# Patient Record
Sex: FEMALE | Race: BLACK OR AFRICAN AMERICAN | Employment: UNEMPLOYED | URBAN - METROPOLITAN AREA
[De-identification: names, ages, dates, MRNs, and addresses within clinical notes are randomized per-mention and may not be internally consistent; named-entity substitution may affect disease eponyms.]

---

## 2019-01-02 LAB
ANTIBODY SCREEN, EXTERNAL: NEGATIVE
CHLAMYDIA, EXTERNAL: NEGATIVE
HBSAG, EXTERNAL: NEGATIVE
HIV, EXTERNAL: NORMAL
N. GONORRHEA, EXTERNAL: NEGATIVE
RPR, EXTERNAL: NORMAL
RUBELLA, EXTERNAL: NORMAL
TYPE, ABO & RH, EXTERNAL: NORMAL

## 2019-04-02 ENCOUNTER — HOSPITAL ENCOUNTER (OUTPATIENT)
Age: 39
Discharge: HOME | End: 2019-04-02
Attending: OBSTETRICS & GYNECOLOGY | Admitting: OBSTETRICS & GYNECOLOGY
Payer: MEDICAID

## 2019-04-02 PROBLEM — O36.8190 DECREASED FETAL MOVEMENT: Status: ACTIVE | Noted: 2019-04-02

## 2019-04-02 LAB
APPEARANCE UR: CLEAR
BILIRUB UR QL: NEGATIVE
COLOR UR: ABNORMAL
EST. AVERAGE GLUCOSE BLD GHB EST-MCNC: 128 MG/DL
GLUCOSE UR QL STRIP.AUTO: NEGATIVE MG/DL
HBA1C MFR BLD: 6.1 % (ref 4.2–5.6)
KETONES UR-MCNC: 15 MG/DL
LEUKOCYTE ESTERASE UR QL STRIP: NEGATIVE
NITRITE UR QL: NEGATIVE
PH UR: 6 [PH] (ref 5–9)
PROT UR QL: 30 MG/DL
RBC # UR STRIP: NEGATIVE /UL
SERVICE CMNT-IMP: ABNORMAL
SP GR UR: >1.03 (ref 1–1.02)
UROBILINOGEN UR QL: 0.2 EU/DL (ref 0.2–1)

## 2019-04-02 PROCEDURE — 83036 HEMOGLOBIN GLYCOSYLATED A1C: CPT

## 2019-04-02 PROCEDURE — 59025 FETAL NON-STRESS TEST: CPT

## 2019-04-02 PROCEDURE — 36415 COLL VENOUS BLD VENIPUNCTURE: CPT

## 2019-04-02 PROCEDURE — 81003 URINALYSIS AUTO W/O SCOPE: CPT

## 2019-04-02 PROCEDURE — 99284 EMERGENCY DEPT VISIT MOD MDM: CPT

## 2019-04-02 NOTE — DISCHARGE INSTRUCTIONS
[facility] states that you have an appointment in the office scheduled for tomorrow. Please keep this appointment. Counting Your Baby's Kicks: Care Instructions  Your Care Instructions    Counting your baby's kicks is one way your doctor can tell that your baby is healthy. Most women--especially in a first pregnancy--feel their baby move for the first time between 16 and 22 weeks. The movement may feel like flutters rather than kicks. Your baby may move more at certain times of the day. When you are active, you may notice less kicking than when you are resting. At your prenatal visits, your doctor will ask whether the baby is active. In your last trimester, your doctor may ask you to count the number of times you feel your baby move. Follow-up care is a key part of your treatment and safety. Be sure to make and go to all appointments, and call your doctor if you are having problems. It's also a good idea to know your test results and keep a list of the medicines you take. How do you count fetal kicks? · A common method of checking your baby's movement is to count the number of kicks or moves you feel in 1 hour. Ten movements (such as kicks, flutters, or rolls) in 1 hour are normal. Some doctors suggest that you count in the morning until you get to 10 movements. Then you can quit for that day and start again the next day. · Pick your baby's most active time of day to count. This may be any time from morning to evening. · If you do not feel 10 movements in an hour, your baby may be sleeping. Wait for the next hour and count again. When should you call for help? Call your doctor now or seek immediate medical care if:    · You noticed that your baby has stopped moving or is moving much less than normal.    Watch closely for changes in your health, and be sure to contact your doctor if you have any problems. Where can you learn more? Go to http://garry-eliza.info/.   Enter G656 in the search box to learn more about \"Counting Your Baby's Kicks: Care Instructions. \"  Current as of: September 5, 2018  Content Version: 11.9  © 5674-5140 [employer]. Care instructions adapted under license by [employer] (which disclaims liability or warranty for this information). If you have questions about a medical condition or this instruction, always ask your healthcare professional. [address] any warranty or liability for your use of this information. Gestational Diabetes Diet: Care Instructions  Your Care Instructions    Gestational diabetes is a form of diabetes that can happen during pregnancy. It usually goes away after the baby is born. Diabetes means that your pancreas can't make enough insulin or your body does not use insulin properly. Insulin helps sugar enter your cells, where it is used for energy. You may be able to control your blood sugar while you are pregnant by eating a healthy diet and getting regular exercise. A dietitian or certified diabetes educator (CDE) can help you make a food plan. This plan will help control your blood sugar and provide good nutrition for you and your baby. If diet and exercise don't lower or control your blood sugar, you may need diabetes medicine or insulin. Follow-up care is a key part of your treatment and safety. Be sure to make and go to all appointments, and call your doctor if you are having problems. It's also a good idea to know your test results and keep a list of the medicines you take. How can you care for yourself at home? · Learn which foods have carbohydrate. Eating too much carbohydrate will cause your blood sugar to go too high. Carbohydrate foods include:  ? Breads, cereals, pasta, and rice. ? Dried beans and starchy vegetables, like corn, peas, and potatoes. ? Fruits and fruit juice, milk, and yogurt. ? Candy, table sugar, soda pop, and drinks sweetened with sugar.   · Learn how much carbohydrate you need each day. A dietitian or certified diabetes educator (CDE) can teach you how to keep track of how much carbohydrate you eat. · Try to eat the same amount of carbohydrate at each meal. This will help keep your blood sugar steady. Do not save up your daily allowance of carbohydrate to eat at one meal.  · Limit foods that have added sugar. This includes candy, desserts, and soda pop. These foods need to be counted as part of your total carbohydrate intake for the day. · Do not drink alcohol. Alcohol is not safe for you or your baby. · Do not skip meals. Your blood sugar may drop too low if you skip meals and use insulin. · Write down what you eat every day. Review your record with your dietitian or CDE to see if you are eating the right amounts of foods. · Check your blood sugar first thing in the morning before you eat. Then check your blood sugar 1 to 2 hours after the first bite of each meal (or as your doctor recommends). This will help you see how the food you eat affects your blood sugar. Keep track of these levels. Share the record with your doctor. When should you call for help? Watch closely for changes in your health, and be sure to contact your doctor if:    · You have questions about your diet.     · You often have problems with high or low blood sugar. Where can you learn more? Go to http://garry-eliza.info/. Enter M291 in the search box to learn more about \"Gestational Diabetes Diet: Care Instructions. \"  Current as of: July 25, 2018  Content Version: 11.9  © 5506-6373 [employer]. Care instructions adapted under license by [employer] (which disclaims liability or warranty for this information). If you have questions about a medical condition or this instruction, always ask your healthcare professional. [place] [address] any warranty or liability for your use of this information.             Weeks 32 to 34 of Your Pregnancy: Care Instructions  Your Care Instructions    During the last few weeks of your pregnancy, you may have more aches and pains. It's important to rest when you can. Your growing baby is putting more pressure on your bladder. So you may need to urinate more often. Hemorrhoids are also common. These are painful, itchy veins in the rectal area. In the 36th week, most women have a test for group B streptococcus (GBS). GBS is a common bacteria that can live in the vagina and rectum. It can make your baby sick after birth. If you test positive, you will get antibiotics during labor. These will keep your baby from getting the bacteria. You may want to talk with your doctor about banking your baby's umbilical cord blood. This is the blood left in the cord after birth. If you want to save this blood, you must arrange it ahead of time. You can't decide at the last minute. If you haven't already had the Tdap shot during this pregnancy, talk to your doctor about getting it. It will help protect your  against pertussis infection. Follow-up care is a key part of your treatment and safety. Be sure to make and go to all appointments, and call your doctor if you are having problems. It's also a good idea to know your test results and keep a list of the medicines you take. How can you care for yourself at home? Ease hemorrhoids  · Get more liquids, fruits, vegetables, and fiber in your diet. This will help keep your stools soft. · Avoid sitting for too long. Lie on your left side several times a day. · Clean yourself with soft, moist toilet paper. Or you can use witch hazel pads or personal hygiene pads. · If you are uncomfortable, try ice packs. Or you can sit in a warm sitz bath. Do these for 20 minutes at a time, as needed. · Use hydrocortisone cream for pain and itching. Two examples are Anusol and Preparation H Hydrocortisone. · Ask your doctor about taking an over-the-counter stool softener.   Consider breastfeeding  · Experts recommend that women breastfeed for 1 year or longer. Breast milk is the perfect food for babies. · Breast milk is easier for babies to digest than formula. And it is always available, just the right temperature, and free. · Breast milk may help protect your child from some health problems.  babies are less likely than formula-fed babies to:  ? Get ear infections, colds, diarrhea, and pneumonia. ? Be obese or get diabetes later in life. · Women who breastfeed have less bleeding after the birth. Their uteruses also shrink back faster. · Some women who breastfeed lose weight faster. Making milk burns calories. · Breastfeeding can lower your risk of breast cancer, ovarian cancer, and osteoporosis. Decide about circumcision for boys  · As you make this decision, it may help to think about your personal, Hindu, and family traditions. You get to decide if you will keep your son's penis natural or if he will be circumcised. · If you decide that you would like to have your baby circumcised, talk with your doctor. You can share your concerns about pain. And you can discuss your preferences for anesthesia. Where can you learn more? Go to http://garry-eliza.info/. Enter D363 in the search box to learn more about \"Weeks 32 to 34 of Your Pregnancy: Care Instructions. \"  Current as of: September 5, 2018  Content Version: 11.9  © 4826-4442 [employer]. Care instructions adapted under license by [employer] (which disclaims liability or warranty for this information). If you have questions about a medical condition or this instruction, always ask your healthcare professional. [place] [address] any warranty or liability for your use of this information.

## 2019-04-02 NOTE — PROGRESS NOTES
1642 Patient presents to unit at 33w2d with complaints of decreased fetal movement. 1800 CNM on phone for results of Ha1C. Order received to discharge patient. 1823 This RN at bedside for discharge instruction. Patient verbalizes understanding. 1827 Patient discharged and off unit.

## 2019-04-19 LAB — GRBS, EXTERNAL: NEGATIVE

## 2019-05-12 RX ORDER — HYDROMORPHONE HYDROCHLORIDE 1 MG/ML
1 INJECTION, SOLUTION INTRAMUSCULAR; INTRAVENOUS; SUBCUTANEOUS
Status: CANCELLED | OUTPATIENT
Start: 2019-05-12

## 2019-05-12 RX ORDER — METHYLERGONOVINE MALEATE 0.2 MG/ML
0.2 INJECTION INTRAVENOUS AS NEEDED
Status: CANCELLED | OUTPATIENT
Start: 2019-05-12

## 2019-05-12 RX ORDER — SODIUM CHLORIDE, SODIUM LACTATE, POTASSIUM CHLORIDE, CALCIUM CHLORIDE 600; 310; 30; 20 MG/100ML; MG/100ML; MG/100ML; MG/100ML
125 INJECTION, SOLUTION INTRAVENOUS CONTINUOUS
Status: CANCELLED | OUTPATIENT
Start: 2019-05-12

## 2019-05-12 RX ORDER — OXYTOCIN/RINGER'S LACTATE 20/1000 ML
999 PLASTIC BAG, INJECTION (ML) INTRAVENOUS ONCE
Status: CANCELLED | OUTPATIENT
Start: 2019-05-12 | End: 2019-05-12

## 2019-05-12 RX ORDER — OXYTOCIN/0.9 % SODIUM CHLORIDE 30/500 ML
0-20 PLASTIC BAG, INJECTION (ML) INTRAVENOUS
Status: CANCELLED | OUTPATIENT
Start: 2019-05-13

## 2019-05-12 RX ORDER — OXYTOCIN/RINGER'S LACTATE 20/1000 ML
125 PLASTIC BAG, INJECTION (ML) INTRAVENOUS CONTINUOUS
Status: CANCELLED | OUTPATIENT
Start: 2019-05-12

## 2019-05-12 RX ORDER — TERBUTALINE SULFATE 1 MG/ML
0.25 INJECTION SUBCUTANEOUS
Status: CANCELLED | OUTPATIENT
Start: 2019-05-12

## 2019-05-12 RX ORDER — MINERAL OIL
30 OIL (ML) ORAL AS NEEDED
Status: CANCELLED | OUTPATIENT
Start: 2019-05-12

## 2019-05-12 RX ORDER — BUTORPHANOL TARTRATE 1 MG/ML
2 INJECTION INTRAMUSCULAR; INTRAVENOUS
Status: CANCELLED | OUTPATIENT
Start: 2019-05-12

## 2019-05-12 RX ORDER — LIDOCAINE HYDROCHLORIDE 10 MG/ML
20 INJECTION, SOLUTION EPIDURAL; INFILTRATION; INTRACAUDAL; PERINEURAL AS NEEDED
Status: CANCELLED | OUTPATIENT
Start: 2019-05-12

## 2019-05-12 RX ORDER — NALBUPHINE HYDROCHLORIDE 10 MG/ML
10 INJECTION, SOLUTION INTRAMUSCULAR; INTRAVENOUS; SUBCUTANEOUS
Status: CANCELLED | OUTPATIENT
Start: 2019-05-12

## 2019-05-13 ENCOUNTER — HOSPITAL ENCOUNTER (INPATIENT)
Age: 39
LOS: 4 days | Discharge: HOME | DRG: 560 | End: 2019-05-17
Attending: OBSTETRICS & GYNECOLOGY | Admitting: OBSTETRICS & GYNECOLOGY
Payer: MEDICAID

## 2019-05-13 PROBLEM — O09.529 ADVANCED MATERNAL AGE IN MULTIGRAVIDA: Status: ACTIVE | Noted: 2019-05-13

## 2019-05-13 PROBLEM — O24.419 GESTATIONAL DIABETES: Status: ACTIVE | Noted: 2019-05-13

## 2019-05-13 PROBLEM — E66.9 OBESITY: Status: ACTIVE | Noted: 2019-05-13

## 2019-05-13 PROBLEM — O36.8190 DECREASED FETAL MOVEMENT: Status: RESOLVED | Noted: 2019-04-02 | Resolved: 2019-05-13

## 2019-05-13 PROBLEM — Z33.1 IUP (INTRAUTERINE PREGNANCY), INCIDENTAL: Status: ACTIVE | Noted: 2019-05-13

## 2019-05-13 LAB
ABO + RH BLD: NORMAL
ALBUMIN SERPL-MCNC: 2.4 G/DL (ref 3.4–5)
ALBUMIN/GLOB SERPL: 0.7 {RATIO} (ref 0.8–1.7)
ALP SERPL-CCNC: 112 U/L (ref 45–117)
ALT SERPL-CCNC: 17 U/L (ref 13–56)
ANION GAP SERPL CALC-SCNC: 9 MMOL/L (ref 3–18)
AST SERPL-CCNC: 20 U/L (ref 15–37)
BASOPHILS # BLD: 0 K/UL (ref 0–0.1)
BASOPHILS NFR BLD: 0 % (ref 0–3)
BILIRUB SERPL-MCNC: 0.2 MG/DL (ref 0.2–1)
BLOOD GROUP ANTIBODIES SERPL: NORMAL
BUN SERPL-MCNC: 12 MG/DL (ref 7–18)
BUN/CREAT SERPL: 23 (ref 12–20)
CALCIUM SERPL-MCNC: 8.7 MG/DL (ref 8.5–10.1)
CHLORIDE SERPL-SCNC: 109 MMOL/L (ref 100–108)
CO2 SERPL-SCNC: 24 MMOL/L (ref 21–32)
CREAT SERPL-MCNC: 0.53 MG/DL (ref 0.6–1.3)
CREAT UR-MCNC: 63 MG/DL (ref 30–125)
DIFFERENTIAL METHOD BLD: ABNORMAL
EOSINOPHIL # BLD: 0 K/UL (ref 0–0.4)
EOSINOPHIL NFR BLD: 0 % (ref 0–5)
ERYTHROCYTE [DISTWIDTH] IN BLOOD BY AUTOMATED COUNT: 14.4 % (ref 11.6–14.5)
GLOBULIN SER CALC-MCNC: 3.5 G/DL (ref 2–4)
GLUCOSE BLD STRIP.AUTO-MCNC: 130 MG/DL (ref 70–110)
GLUCOSE BLD STRIP.AUTO-MCNC: 155 MG/DL (ref 70–110)
GLUCOSE BLD STRIP.AUTO-MCNC: 71 MG/DL (ref 70–110)
GLUCOSE BLD STRIP.AUTO-MCNC: 89 MG/DL (ref 70–110)
GLUCOSE SERPL-MCNC: 96 MG/DL (ref 74–99)
HCT VFR BLD AUTO: 28.5 % (ref 35–45)
HGB BLD-MCNC: 9.1 G/DL (ref 12–16)
LDH SERPL L TO P-CCNC: 207 U/L (ref 81–234)
LYMPHOCYTES # BLD: 1 K/UL (ref 0.8–3.5)
LYMPHOCYTES NFR BLD: 11 % (ref 20–51)
MCH RBC QN AUTO: 26.1 PG (ref 24–34)
MCHC RBC AUTO-ENTMCNC: 31.9 G/DL (ref 31–37)
MCV RBC AUTO: 81.7 FL (ref 74–97)
MONOCYTES # BLD: 0.6 K/UL (ref 0–1)
MONOCYTES NFR BLD: 7 % (ref 2–9)
NEUTS BAND NFR BLD MANUAL: 4 % (ref 0–5)
NEUTS SEG # BLD: 6.8 K/UL (ref 1.8–8)
NEUTS SEG NFR BLD: 78 % (ref 42–75)
PLATELET # BLD AUTO: 208 K/UL (ref 135–420)
PLATELET COMMENTS,PCOM: ABNORMAL
PMV BLD AUTO: 11.4 FL (ref 9.2–11.8)
POTASSIUM SERPL-SCNC: 4.3 MMOL/L (ref 3.5–5.5)
PROT SERPL-MCNC: 5.9 G/DL (ref 6.4–8.2)
PROT UR-MCNC: 22 MG/DL
PROT/CREAT UR-RTO: 0.3
RBC # BLD AUTO: 3.49 M/UL (ref 4.2–5.3)
RBC MORPH BLD: ABNORMAL
SODIUM SERPL-SCNC: 142 MMOL/L (ref 136–145)
SPECIMEN EXP DATE BLD: NORMAL
URATE SERPL-MCNC: 4.7 MG/DL (ref 2.6–7.2)
WBC # BLD AUTO: 8.7 K/UL (ref 4.6–13.2)

## 2019-05-13 PROCEDURE — 82962 GLUCOSE BLOOD TEST: CPT

## 2019-05-13 PROCEDURE — 75410000002 HC LABOR FEE PER 1 HR

## 2019-05-13 PROCEDURE — 80053 COMPREHEN METABOLIC PANEL: CPT

## 2019-05-13 PROCEDURE — 86900 BLOOD TYPING SEROLOGIC ABO: CPT

## 2019-05-13 PROCEDURE — 3E0P7VZ INTRODUCTION OF HORMONE INTO FEMALE REPRODUCTIVE, VIA NATURAL OR ARTIFICIAL OPENING: ICD-10-PCS | Performed by: OBSTETRICS & GYNECOLOGY

## 2019-05-13 PROCEDURE — 74011250637 HC RX REV CODE- 250/637: Performed by: OBSTETRICS & GYNECOLOGY

## 2019-05-13 PROCEDURE — 85025 COMPLETE CBC W/AUTO DIFF WBC: CPT

## 2019-05-13 PROCEDURE — 84550 ASSAY OF BLOOD/URIC ACID: CPT

## 2019-05-13 PROCEDURE — 84156 ASSAY OF PROTEIN URINE: CPT

## 2019-05-13 PROCEDURE — 74011250637 HC RX REV CODE- 250/637: Performed by: ADVANCED PRACTICE MIDWIFE

## 2019-05-13 PROCEDURE — 65270000029 HC RM PRIVATE

## 2019-05-13 PROCEDURE — 83615 LACTATE (LD) (LDH) ENZYME: CPT

## 2019-05-13 PROCEDURE — 74011250636 HC RX REV CODE- 250/636: Performed by: ADVANCED PRACTICE MIDWIFE

## 2019-05-13 RX ORDER — MINERAL OIL
30 OIL (ML) ORAL AS NEEDED
Status: DISCONTINUED | OUTPATIENT
Start: 2019-05-13 | End: 2019-05-15 | Stop reason: HOSPADM

## 2019-05-13 RX ORDER — SODIUM CHLORIDE, SODIUM LACTATE, POTASSIUM CHLORIDE, CALCIUM CHLORIDE 600; 310; 30; 20 MG/100ML; MG/100ML; MG/100ML; MG/100ML
125 INJECTION, SOLUTION INTRAVENOUS CONTINUOUS
Status: DISCONTINUED | OUTPATIENT
Start: 2019-05-13 | End: 2019-05-15 | Stop reason: HOSPADM

## 2019-05-13 RX ORDER — LABETALOL 200 MG/1
200 TABLET, FILM COATED ORAL 3 TIMES DAILY
Status: DISCONTINUED | OUTPATIENT
Start: 2019-05-13 | End: 2019-05-17 | Stop reason: HOSPADM

## 2019-05-13 RX ORDER — ACETAMINOPHEN 500 MG
1000 TABLET ORAL
Status: COMPLETED | OUTPATIENT
Start: 2019-05-13 | End: 2019-05-13

## 2019-05-13 RX ORDER — HYDROMORPHONE HYDROCHLORIDE 1 MG/ML
1 INJECTION, SOLUTION INTRAMUSCULAR; INTRAVENOUS; SUBCUTANEOUS
Status: DISCONTINUED | OUTPATIENT
Start: 2019-05-13 | End: 2019-05-15 | Stop reason: HOSPADM

## 2019-05-13 RX ORDER — TERBUTALINE SULFATE 1 MG/ML
0.25 INJECTION SUBCUTANEOUS
Status: DISCONTINUED | OUTPATIENT
Start: 2019-05-13 | End: 2019-05-15 | Stop reason: HOSPADM

## 2019-05-13 RX ORDER — METHYLERGONOVINE MALEATE 0.2 MG/ML
0.2 INJECTION INTRAVENOUS AS NEEDED
Status: DISCONTINUED | OUTPATIENT
Start: 2019-05-13 | End: 2019-05-15 | Stop reason: HOSPADM

## 2019-05-13 RX ORDER — BUTORPHANOL TARTRATE 2 MG/ML
2 INJECTION INTRAMUSCULAR; INTRAVENOUS
Status: DISCONTINUED | OUTPATIENT
Start: 2019-05-13 | End: 2019-05-15 | Stop reason: HOSPADM

## 2019-05-13 RX ORDER — OXYTOCIN/0.9 % SODIUM CHLORIDE 30/500 ML
0-20 PLASTIC BAG, INJECTION (ML) INTRAVENOUS
Status: DISCONTINUED | OUTPATIENT
Start: 2019-05-13 | End: 2019-05-15

## 2019-05-13 RX ORDER — NALBUPHINE HYDROCHLORIDE 10 MG/ML
10 INJECTION, SOLUTION INTRAMUSCULAR; INTRAVENOUS; SUBCUTANEOUS
Status: DISCONTINUED | OUTPATIENT
Start: 2019-05-13 | End: 2019-05-15 | Stop reason: HOSPADM

## 2019-05-13 RX ORDER — OXYTOCIN/RINGER'S LACTATE 20/1000 ML
999 PLASTIC BAG, INJECTION (ML) INTRAVENOUS ONCE
Status: ACTIVE | OUTPATIENT
Start: 2019-05-13 | End: 2019-05-13

## 2019-05-13 RX ORDER — OXYTOCIN/RINGER'S LACTATE 20/1000 ML
125 PLASTIC BAG, INJECTION (ML) INTRAVENOUS CONTINUOUS
Status: DISCONTINUED | OUTPATIENT
Start: 2019-05-13 | End: 2019-05-15 | Stop reason: HOSPADM

## 2019-05-13 RX ORDER — MISOPROSTOL 100 UG/1
TABLET ORAL
Status: DISCONTINUED
Start: 2019-05-13 | End: 2019-05-13 | Stop reason: WASHOUT

## 2019-05-13 RX ORDER — LIDOCAINE HYDROCHLORIDE 10 MG/ML
20 INJECTION, SOLUTION EPIDURAL; INFILTRATION; INTRACAUDAL; PERINEURAL AS NEEDED
Status: DISCONTINUED | OUTPATIENT
Start: 2019-05-13 | End: 2019-05-15 | Stop reason: HOSPADM

## 2019-05-13 RX ORDER — LABETALOL 200 MG/1
200 TABLET, FILM COATED ORAL
Status: COMPLETED | OUTPATIENT
Start: 2019-05-13 | End: 2019-05-13

## 2019-05-13 RX ADMIN — DINOPROSTONE 10 MG: 10 INSERT VAGINAL at 17:11

## 2019-05-13 RX ADMIN — SODIUM CHLORIDE, SODIUM LACTATE, POTASSIUM CHLORIDE, AND CALCIUM CHLORIDE 125 ML/HR: 600; 310; 30; 20 INJECTION, SOLUTION INTRAVENOUS at 15:00

## 2019-05-13 RX ADMIN — LABETALOL HCL 200 MG: 200 TABLET, FILM COATED ORAL at 23:33

## 2019-05-13 RX ADMIN — ACETAMINOPHEN 1000 MG: 500 TABLET ORAL at 18:27

## 2019-05-13 RX ADMIN — SODIUM CHLORIDE, SODIUM LACTATE, POTASSIUM CHLORIDE, AND CALCIUM CHLORIDE 125 ML/HR: 600; 310; 30; 20 INJECTION, SOLUTION INTRAVENOUS at 07:42

## 2019-05-13 RX ADMIN — LABETALOL HCL 200 MG: 200 TABLET, FILM COATED ORAL at 09:32

## 2019-05-13 RX ADMIN — LABETALOL HCL 200 MG: 200 TABLET, FILM COATED ORAL at 16:43

## 2019-05-13 RX ADMIN — Medication 6 MILLI-UNITS/MIN: at 07:39

## 2019-05-13 NOTE — PROGRESS NOTES
8559: SBAr to [place]; elevated BP, orders received for [facility] labs.

## 2019-05-13 NOTE — PROGRESS NOTES
0715 Bedside and Verbal shift change report given to JAN Ko RN (oncoming nurse) by Darren Long RN (offgoing nurse). Report included the following information SBAR, Kardex, Intake/Output, MAR, Recent Results and Med Rec Status. 0840 spoke with Jody Ley CNM. 0900 Patient refused labetalol, patient would like to contact her grandmother before she starts medication. Patient educated on importance of medication. Georgina William at bedside. Patient ok with getting labetalol SVE 1/50/-3 
 
1330 SVE unchanged [address] with Jody Ley CNM. Patient to stop pitocin, eat and shower. Patient to start cervidil at 1630.  
 
1545 Patient up to take shower 1710 Patient returned to bed 
 
1630 Patient waiting on dinner from dietary. Will give cervidil when done eating 1711 Cervidil inserted SVE unchanged [address] with Dr. Isaiah Keating about patient's headache, patient to get tylenol. 1747 Patient states she does not want tylenol for her headache.  
 
1802 Patient turned to left side. 1915 Bedside and Verbal shift change report given to ARTHUR Madrigal RN (oncoming nurse) by Heath Fitzpatrick. LISA Ko (offgoing nurse). Report included the following information SBAR, Kardex, Intake/Output, MAR, Recent Results and Alarm Parameters .

## 2019-05-13 NOTE — PROGRESS NOTES
1915 Bedside report received from LIGIA Ko RN. Plan of care reviewed. 1930 Assessment complete. VSS. Denies any pain. Requesting IV to be taken out now. No sign of infiltration. Patient states it is in a painful area in the bend of her hand. 1935 IV removed with tip intact. 1950 EFM adjusted. 0500 Cervidil removed. [facility] Update given to include SVE, Pitocin and EFM tracing.  
 
7793 Assisted to right lateral position. EFM adjusted. 9260 EFM adjusted. 0204 Bedside and Verbal shift change report given to JERONIMO Early RN (oncoming nurse) by ARTHUR Bah RN (offgoing nurse). Report included the following information SBAR, Procedure Summary and MAR.

## 2019-05-13 NOTE — PROGRESS NOTES
Problem: Vaginal Delivery: Day of Deliver-Laboring Goal: Activity/Safety Outcome: Progressing Towards Goal 
Goal: Consults, if ordered Outcome: Progressing Towards Goal 
Goal: Diagnostic Test/Procedures Outcome: Progressing Towards Goal 
Goal: Nutrition/Diet Outcome: Progressing Towards Goal 
Goal: Discharge Planning Outcome: Progressing Towards Goal 
Goal: Medications Outcome: Progressing Towards Goal 
Goal: Respiratory Outcome: Progressing Towards Goal 
Goal: Treatments/Interventions/Procedures Outcome: Progressing Towards Goal 
Goal: *Vital signs within defined limits Outcome: Progressing Towards Goal 
Goal: *Labs within defined limits Outcome: Progressing Towards Goal 
Goal: *Hemodynamically stable Outcome: Progressing Towards Goal 
Goal: *Optimal pain control at patient's stated goal 
Outcome: Progressing Towards Goal

## 2019-05-14 ENCOUNTER — ANESTHESIA EVENT (OUTPATIENT)
Dept: LABOR AND DELIVERY | Age: 39
DRG: 560 | End: 2019-05-14
Payer: MEDICAID

## 2019-05-14 ENCOUNTER — ANESTHESIA (OUTPATIENT)
Dept: LABOR AND DELIVERY | Age: 39
DRG: 560 | End: 2019-05-14
Payer: MEDICAID

## 2019-05-14 LAB
ERYTHROCYTE [DISTWIDTH] IN BLOOD BY AUTOMATED COUNT: 14.9 % (ref 11.6–14.5)
GLUCOSE BLD STRIP.AUTO-MCNC: 107 MG/DL (ref 70–110)
GLUCOSE BLD STRIP.AUTO-MCNC: 116 MG/DL (ref 70–110)
GLUCOSE BLD STRIP.AUTO-MCNC: 85 MG/DL (ref 70–110)
GLUCOSE BLD STRIP.AUTO-MCNC: 86 MG/DL (ref 70–110)
GLUCOSE BLD STRIP.AUTO-MCNC: 98 MG/DL (ref 70–110)
HCT VFR BLD AUTO: 28.5 % (ref 35–45)
HGB BLD-MCNC: 9.2 G/DL (ref 12–16)
MCH RBC QN AUTO: 25.6 PG (ref 24–34)
MCHC RBC AUTO-ENTMCNC: 32.3 G/DL (ref 31–37)
MCV RBC AUTO: 79.4 FL (ref 74–97)
PLATELET # BLD AUTO: 211 K/UL (ref 135–420)
PMV BLD AUTO: 11.1 FL (ref 9.2–11.8)
RBC # BLD AUTO: 3.59 M/UL (ref 4.2–5.3)
WBC # BLD AUTO: 8.2 K/UL (ref 4.6–13.2)

## 2019-05-14 PROCEDURE — 85027 COMPLETE CBC AUTOMATED: CPT

## 2019-05-14 PROCEDURE — 10907ZC DRAINAGE OF AMNIOTIC FLUID, THERAPEUTIC FROM PRODUCTS OF CONCEPTION, VIA NATURAL OR ARTIFICIAL OPENING: ICD-10-PCS | Performed by: OBSTETRICS & GYNECOLOGY

## 2019-05-14 PROCEDURE — 74011250636 HC RX REV CODE- 250/636: Performed by: ANESTHESIOLOGY

## 2019-05-14 PROCEDURE — 77010026065 HC OXYGEN MINIMUM MEDICAL AIR

## 2019-05-14 PROCEDURE — 74011000250 HC RX REV CODE- 250

## 2019-05-14 PROCEDURE — 77030010848 HC CATH INTUTR PRSS KOLB -B

## 2019-05-14 PROCEDURE — 74011000250 HC RX REV CODE- 250: Performed by: ANESTHESIOLOGY

## 2019-05-14 PROCEDURE — 77030007879 HC KT SPN EPDRL TELE -B: Performed by: ANESTHESIOLOGY

## 2019-05-14 PROCEDURE — 00HU33Z INSERTION OF INFUSION DEVICE INTO SPINAL CANAL, PERCUTANEOUS APPROACH: ICD-10-PCS | Performed by: ANESTHESIOLOGY

## 2019-05-14 PROCEDURE — 75410000002 HC LABOR FEE PER 1 HR

## 2019-05-14 PROCEDURE — 3E033VJ INTRODUCTION OF OTHER HORMONE INTO PERIPHERAL VEIN, PERCUTANEOUS APPROACH: ICD-10-PCS | Performed by: OBSTETRICS & GYNECOLOGY

## 2019-05-14 PROCEDURE — 74011250637 HC RX REV CODE- 250/637: Performed by: OBSTETRICS & GYNECOLOGY

## 2019-05-14 PROCEDURE — 76060000078 HC EPIDURAL ANESTHESIA

## 2019-05-14 PROCEDURE — 77030009413 HC ELECTRD SCALP COVD -A

## 2019-05-14 PROCEDURE — 74011250636 HC RX REV CODE- 250/636: Performed by: ADVANCED PRACTICE MIDWIFE

## 2019-05-14 PROCEDURE — 77030034849

## 2019-05-14 PROCEDURE — 82962 GLUCOSE BLOOD TEST: CPT

## 2019-05-14 PROCEDURE — 65270000029 HC RM PRIVATE

## 2019-05-14 PROCEDURE — 36415 COLL VENOUS BLD VENIPUNCTURE: CPT

## 2019-05-14 PROCEDURE — 74011250636 HC RX REV CODE- 250/636

## 2019-05-14 RX ORDER — NALOXONE HYDROCHLORIDE 0.4 MG/ML
0.2 INJECTION, SOLUTION INTRAMUSCULAR; INTRAVENOUS; SUBCUTANEOUS AS NEEDED
Status: DISCONTINUED | OUTPATIENT
Start: 2019-05-14 | End: 2019-05-15 | Stop reason: HOSPADM

## 2019-05-14 RX ORDER — FENTANYL CITRATE 50 UG/ML
100 INJECTION, SOLUTION INTRAMUSCULAR; INTRAVENOUS ONCE
Status: COMPLETED | OUTPATIENT
Start: 2019-05-14 | End: 2019-05-14

## 2019-05-14 RX ORDER — SODIUM CHLORIDE 0.9 % (FLUSH) 0.9 %
5-40 SYRINGE (ML) INJECTION EVERY 8 HOURS
Status: DISCONTINUED | OUTPATIENT
Start: 2019-05-14 | End: 2019-05-15 | Stop reason: HOSPADM

## 2019-05-14 RX ORDER — LIDOCAINE HYDROCHLORIDE 10 MG/ML
INJECTION INFILTRATION; PERINEURAL
Status: DISPENSED
Start: 2019-05-14 | End: 2019-05-15

## 2019-05-14 RX ORDER — LIDOCAINE HYDROCHLORIDE AND EPINEPHRINE 15; 5 MG/ML; UG/ML
INJECTION, SOLUTION EPIDURAL
Status: COMPLETED | OUTPATIENT
Start: 2019-05-14 | End: 2019-05-14

## 2019-05-14 RX ORDER — SODIUM CHLORIDE 0.9 % (FLUSH) 0.9 %
5-40 SYRINGE (ML) INJECTION AS NEEDED
Status: DISCONTINUED | OUTPATIENT
Start: 2019-05-14 | End: 2019-05-15 | Stop reason: HOSPADM

## 2019-05-14 RX ORDER — PHENYLEPHRINE HCL IN 0.9% NACL 1 MG/10 ML
80 SYRINGE (ML) INTRAVENOUS AS NEEDED
Status: DISCONTINUED | OUTPATIENT
Start: 2019-05-14 | End: 2019-05-15 | Stop reason: HOSPADM

## 2019-05-14 RX ORDER — FENTANYL/ROPIVACAINE/NS/PF 2MCG/ML-.1
1-15 PLASTIC BAG, INJECTION (ML) EPIDURAL CONTINUOUS
Status: DISCONTINUED | OUTPATIENT
Start: 2019-05-14 | End: 2019-05-15 | Stop reason: HOSPADM

## 2019-05-14 RX ORDER — FENTANYL CITRATE 50 UG/ML
INJECTION, SOLUTION INTRAMUSCULAR; INTRAVENOUS AS NEEDED
Status: DISCONTINUED | OUTPATIENT
Start: 2019-05-14 | End: 2019-05-15 | Stop reason: HOSPADM

## 2019-05-14 RX ADMIN — Medication 12 MILLI-UNITS/MIN: at 18:48

## 2019-05-14 RX ADMIN — ROPIVACAINE HYDROCHLORIDE 14 ML/HR: 10 INJECTION, SOLUTION EPIDURAL at 22:11

## 2019-05-14 RX ADMIN — ROPIVACAINE HYDROCHLORIDE 14 ML/HR: 10 INJECTION, SOLUTION EPIDURAL at 17:27

## 2019-05-14 RX ADMIN — Medication 6 MILLI-UNITS/MIN: at 05:50

## 2019-05-14 RX ADMIN — SODIUM CHLORIDE, SODIUM LACTATE, POTASSIUM CHLORIDE, AND CALCIUM CHLORIDE 125 ML/HR: 600; 310; 30; 20 INJECTION, SOLUTION INTRAVENOUS at 11:26

## 2019-05-14 RX ADMIN — LABETALOL HCL 200 MG: 200 TABLET, FILM COATED ORAL at 22:06

## 2019-05-14 RX ADMIN — LIDOCAINE HYDROCHLORIDE AND EPINEPHRINE 0.5 ML: 15; 5 INJECTION, SOLUTION EPIDURAL at 11:03

## 2019-05-14 RX ADMIN — SODIUM CHLORIDE, SODIUM LACTATE, POTASSIUM CHLORIDE, AND CALCIUM CHLORIDE 125 ML/HR: 600; 310; 30; 20 INJECTION, SOLUTION INTRAVENOUS at 17:27

## 2019-05-14 RX ADMIN — ROPIVACAINE HYDROCHLORIDE 14 ML/HR: 10 INJECTION, SOLUTION EPIDURAL at 11:11

## 2019-05-14 RX ADMIN — Medication 6 MILLI-UNITS/MIN: at 17:44

## 2019-05-14 RX ADMIN — SODIUM CHLORIDE, SODIUM LACTATE, POTASSIUM CHLORIDE, AND CALCIUM CHLORIDE 500 ML: 600; 310; 30; 20 INJECTION, SOLUTION INTRAVENOUS at 10:19

## 2019-05-14 RX ADMIN — FENTANYL CITRATE 100 MCG: 50 INJECTION, SOLUTION INTRAMUSCULAR; INTRAVENOUS at 11:04

## 2019-05-14 RX ADMIN — SODIUM CHLORIDE, SODIUM LACTATE, POTASSIUM CHLORIDE, AND CALCIUM CHLORIDE 125 ML/HR: 600; 310; 30; 20 INJECTION, SOLUTION INTRAVENOUS at 17:40

## 2019-05-14 RX ADMIN — BUTORPHANOL TARTRATE 2 MG: 2 INJECTION, SOLUTION INTRAMUSCULAR; INTRAVENOUS at 09:26

## 2019-05-14 RX ADMIN — FENTANYL CITRATE 100 MCG: 50 INJECTION, SOLUTION INTRAMUSCULAR; INTRAVENOUS at 11:05

## 2019-05-14 RX ADMIN — LABETALOL HCL 200 MG: 200 TABLET, FILM COATED ORAL at 09:04

## 2019-05-14 NOTE — ANESTHESIA PREPROCEDURE EVALUATION
Relevant Problems No relevant active problems Anesthetic History No history of anesthetic complications Review of Systems / Medical History Patient summary reviewed, nursing notes reviewed and pertinent labs reviewed Pulmonary Pertinent negatives: No asthma, sleep apnea and smoker Neuro/Psych Pertinent negatives: No seizures and CVA Cardiovascular Pertinent negatives: No hypertension Exercise tolerance: >4 METS 
  
GI/Hepatic/Renal 
  
 
 
 
 
Pertinent negatives: No liver disease and renal disease Endo/Other Diabetes (gestational) Morbid obesity (BMI 40) Other Findings Physical Exam 
 
Airway Mallampati: II 
TM Distance: 4 - 6 cm Neck ROM: normal range of motion Mouth opening: Normal 
 
 Cardiovascular Rhythm: regular Rate: normal 
 
 
 
 Dental 
No notable dental hx Pulmonary Breath sounds clear to auscultation Abdominal 
GI exam deferred Other Findings Anesthetic Plan ASA: 3 Anesthesia type: epidural 
 
 
 
 
 
Anesthetic plan and risks discussed with: Patient Plan labor epidural.  All risks discussed including but not limited to pain, bleeding, infection, block failure, headache, hypotension, and nerve damage. Patient understands and accepts all risks and agrees with plan to proceed with epidural analgesia.

## 2019-05-14 NOTE — PROGRESS NOTES
8821 report received from Alexander Alfonso RN. Pt resting on right side dozing intermittently. Pt is aware of poc and agrees. Pitocin increased pt repositioned,. toco and us adjusted. Will continue to monitor. 0752  Pt standing at bedside. 7121 pt up to [address] GUERDA Abarca at bedside to evaluate pt.sve attempted. Pt up to br and back to bed. Pt given 2mg stadol. 0933 AROM clear 3-4/50/-3 per CNM IUPC placed. Gown changed pads changed. 8693 pt resting and hydrating for epidural. 
 
1043 Guerzon at bedside for epidural placement. 1053 Time out for epidural. 
1102 epidural cath placed. 1103 test dose negative. 1108 loading dose given. [place] changed. 1121 pump started. 1152 alcantara catheter placed. Spoke with Trudy Tomas at advised slight cervical change of 3/50/-2 at this time. Order received for O2 via nasal cannula due to pt sleep apnea. Will continue to monitor. 1205 peanut ball. 1215 2L O2 via nasal cannula for sleep apnea. 1418 BS 86 at this time. 1430 GUERDA Bender at bedside evaluating pt. sve 4/50/-2. Will reduce pitocin by half per CNM. [address] notified of SVE 4/50/-1. CNM to notify  MD and call unit back. 1639 Orders received to stop pitocin for 30  mins and restart at 6 and increase q 20 by 2 with a fresh bag of pitocin. 1744 pit restarted at 6mu/min 1845 pt on left side with right leg in stirrup. 1920 Report given to MADELINE Saldana RN.

## 2019-05-14 NOTE — PROGRESS NOTES
Problem: Vaginal Delivery: Day of Deliver-Laboring Goal: Activity/Safety Outcome: Progressing Towards Goal 
  
Problem: Pain Goal: *Control of Pain Outcome: Progressing Towards Goal

## 2019-05-14 NOTE — ANESTHESIA PROCEDURE NOTES
Epidural Block    Start time: 5/14/2019 10:43 AM  End time: 5/14/2019 11:08 AM  Performed by: Harpal Ley MD  Authorized by: Harpal Ley MD     Pre-Procedure  Indication: labor epidural    Preanesthetic Checklist: patient identified, risks and benefits discussed, anesthesia consent, site marked, patient being monitored, timeout performed and anesthesia consent    Timeout Time: 10:53        Epidural:   Patient position:  Seated  Prep region:  Lumbar  Prep: Chlorhexidine    Location:  L3-4    Needle and Epidural Catheter:   Needle Type:  Tuohy  Needle Gauge:  17 G  Injection Technique:  Loss of resistance using saline  Attempts:  1  Catheter Size:  19 G  Catheter at Skin Depth (cm):  13  Depth in Epidural Space (cm):  5  Events: no blood with aspiration, no cerebrospinal fluid with aspiration, no paresthesia and negative aspiration test    Test Dose:  Negative    Assessment:   Catheter Secured:  Tegaderm and tape  Insertion:  Uncomplicated  Patient tolerance:  Patient tolerated the procedure well with no immediate complications  Single attempt at L3/4, two passes, KELTON at 8cm -heme - CSF -pain or paresthesia during threading TD or bolus

## 2019-05-14 NOTE — ANESTHESIA PROCEDURE NOTES
Epidural Block    Start time: 5/14/2019 10:43 AM  End time: 5/14/2019 11:08 AM  Performed by: Luis Saavedra MD  Authorized by: Luis Saavedra MD     Pre-Procedure  Indication: labor epidural    Preanesthetic Checklist: patient identified, risks and benefits discussed, anesthesia consent, site marked, patient being monitored, timeout performed and anesthesia consent    Timeout Time: 11:03        Epidural:   Patient position:  Seated  Prep region:  Lumbar  Prep: Chlorhexidine    Location:  L3-4    Needle and Epidural Catheter:   Needle Type:  Tuohy  Needle Gauge:  17 G  Injection Technique:  Loss of resistance using saline  Attempts:  1  Catheter Size:  19 G  Catheter at Skin Depth (cm):  13  Depth in Epidural Space (cm):  5  Events: no blood with aspiration, no cerebrospinal fluid with aspiration, no paresthesia and negative aspiration test    Test Dose:  Negative    Assessment:   Catheter Secured:  Tegaderm and tape  Insertion:  Uncomplicated  Patient tolerance:  Patient tolerated the procedure well with no immediate complications  Single puncture at L3/4, two passes, KELTON at 8cm - heme - CSF - pain or paresthesia during KELTON, threading or TD

## 2019-05-14 NOTE — PROGRESS NOTES
Labor Progress Note Patient seen, fetal heart rate and contraction pattern evaluated. Physical Exam: 
Pelvic: Cervix 3-4, Effaced: 50% Station:  -2 Artificial Rupture of Membranes; Amniotic Fluid: large amount of clear fluid Contractions: Every 2-4 minutes, moderate Fetal Heart Rate: Reactive Assessment: 
Satisfactory labor progress. PLAN: 
Reassuring fetal status, Labor  Not progressing normally  continue pitocin augmentation, Continue plan for vaginal delivery Nicanor Maza CNM 
5/14/2019 
9:36 AM

## 2019-05-14 NOTE — PROGRESS NOTES
Labor Progress Note Patient seen, fetal heart rate and contraction pattern evaluated. Physical Exam: 
Pelvic: Cervix 4, Effaced: 50% Station:  -2 
  
Ruptured Contractions: Every 2-4 minutes, moderate Fetal Heart Rate: Reactive Assessment: 
Satisfactory labor progress. PLAN: 
Reassuring fetal status, Continue plan for vaginal delivery Latisha Ruiz CNM 
5/14/2019 
2:33 PM

## 2019-05-15 PROBLEM — O99.019 ANEMIA DURING PREGNANCY: Status: ACTIVE | Noted: 2019-05-15

## 2019-05-15 LAB
CREAT SERPL-MCNC: 0.83 MG/DL (ref 0.6–1.3)
GENTAMICIN SERPL-MCNC: 1 UG/ML (ref 0.5–10)
GLUCOSE BLD STRIP.AUTO-MCNC: 123 MG/DL (ref 70–110)

## 2019-05-15 PROCEDURE — 82565 ASSAY OF CREATININE: CPT

## 2019-05-15 PROCEDURE — 75410000003 HC RECOV DEL/VAG/CSECN EA 0.5 HR

## 2019-05-15 PROCEDURE — 74011000258 HC RX REV CODE- 258: Performed by: OBSTETRICS & GYNECOLOGY

## 2019-05-15 PROCEDURE — 75410000000 HC DELIVERY VAGINAL/SINGLE

## 2019-05-15 PROCEDURE — 74011000250 HC RX REV CODE- 250: Performed by: ANESTHESIOLOGY

## 2019-05-15 PROCEDURE — 74011250636 HC RX REV CODE- 250/636: Performed by: OBSTETRICS & GYNECOLOGY

## 2019-05-15 PROCEDURE — 74011250637 HC RX REV CODE- 250/637: Performed by: OBSTETRICS & GYNECOLOGY

## 2019-05-15 PROCEDURE — 77010026065 HC OXYGEN MINIMUM MEDICAL AIR

## 2019-05-15 PROCEDURE — 80170 ASSAY OF GENTAMICIN: CPT

## 2019-05-15 PROCEDURE — 65270000029 HC RM PRIVATE

## 2019-05-15 PROCEDURE — 36415 COLL VENOUS BLD VENIPUNCTURE: CPT

## 2019-05-15 PROCEDURE — 75410000002 HC LABOR FEE PER 1 HR

## 2019-05-15 PROCEDURE — 0HQ9XZZ REPAIR PERINEUM SKIN, EXTERNAL APPROACH: ICD-10-PCS | Performed by: OBSTETRICS & GYNECOLOGY

## 2019-05-15 PROCEDURE — 82962 GLUCOSE BLOOD TEST: CPT

## 2019-05-15 PROCEDURE — 74011250636 HC RX REV CODE- 250/636: Performed by: ADVANCED PRACTICE MIDWIFE

## 2019-05-15 RX ORDER — ACETAMINOPHEN 325 MG/1
650 TABLET ORAL
Status: DISCONTINUED | OUTPATIENT
Start: 2019-05-15 | End: 2019-05-17 | Stop reason: HOSPADM

## 2019-05-15 RX ORDER — AMOXICILLIN 250 MG
1 CAPSULE ORAL
Status: DISCONTINUED | OUTPATIENT
Start: 2019-05-15 | End: 2019-05-17 | Stop reason: HOSPADM

## 2019-05-15 RX ORDER — CLINDAMYCIN PHOSPHATE 900 MG/50ML
900 INJECTION, SOLUTION INTRAVENOUS EVERY 8 HOURS
Status: DISCONTINUED | OUTPATIENT
Start: 2019-05-15 | End: 2019-05-17 | Stop reason: HOSPADM

## 2019-05-15 RX ORDER — CLINDAMYCIN PHOSPHATE 900 MG/50ML
INJECTION, SOLUTION INTRAVENOUS
Status: DISCONTINUED
Start: 2019-05-15 | End: 2019-05-15

## 2019-05-15 RX ORDER — ACETAMINOPHEN 10 MG/ML
INJECTION, SOLUTION INTRAVENOUS
Status: DISCONTINUED
Start: 2019-05-15 | End: 2019-05-15

## 2019-05-15 RX ORDER — ZOLPIDEM TARTRATE 5 MG/1
5 TABLET ORAL
Status: DISCONTINUED | OUTPATIENT
Start: 2019-05-15 | End: 2019-05-17 | Stop reason: HOSPADM

## 2019-05-15 RX ORDER — ACETAMINOPHEN 10 MG/ML
1000 INJECTION, SOLUTION INTRAVENOUS ONCE
Status: COMPLETED | OUTPATIENT
Start: 2019-05-15 | End: 2019-05-15

## 2019-05-15 RX ORDER — IBUPROFEN 400 MG/1
800 TABLET ORAL
Status: DISCONTINUED | OUTPATIENT
Start: 2019-05-15 | End: 2019-05-17 | Stop reason: HOSPADM

## 2019-05-15 RX ORDER — OXYCODONE AND ACETAMINOPHEN 5; 325 MG/1; MG/1
2 TABLET ORAL
Status: DISCONTINUED | OUTPATIENT
Start: 2019-05-15 | End: 2019-05-17 | Stop reason: HOSPADM

## 2019-05-15 RX ORDER — PROMETHAZINE HYDROCHLORIDE 25 MG/ML
25 INJECTION, SOLUTION INTRAMUSCULAR; INTRAVENOUS
Status: DISCONTINUED | OUTPATIENT
Start: 2019-05-15 | End: 2019-05-17 | Stop reason: HOSPADM

## 2019-05-15 RX ADMIN — ROPIVACAINE HYDROCHLORIDE 14 ML/HR: 10 INJECTION, SOLUTION EPIDURAL at 02:17

## 2019-05-15 RX ADMIN — CLINDAMYCIN PHOSPHATE 900 MG: 900 INJECTION, SOLUTION INTRAVENOUS at 13:26

## 2019-05-15 RX ADMIN — LABETALOL HCL 200 MG: 200 TABLET, FILM COATED ORAL at 21:35

## 2019-05-15 RX ADMIN — GENTAMICIN SULFATE 377.2 MG: 40 INJECTION, SOLUTION INTRAMUSCULAR; INTRAVENOUS at 05:16

## 2019-05-15 RX ADMIN — CLINDAMYCIN PHOSPHATE 900 MG: 900 INJECTION, SOLUTION INTRAVENOUS at 04:16

## 2019-05-15 RX ADMIN — VITAMIN A, VITAMIN C, VITAMIN D-3, VITAMIN E, VITAMIN B-1, VITAMIN B-2, NIACIN, VITAMIN B-6, CALCIUM, IRON, ZINC, COPPER 1 TABLET: 4000; 120; 400; 22; 1.84; 3; 20; 10; 1; 12; 200; 27; 25; 2 TABLET ORAL at 08:53

## 2019-05-15 RX ADMIN — IBUPROFEN 800 MG: 400 TABLET, FILM COATED ORAL at 13:35

## 2019-05-15 RX ADMIN — CLINDAMYCIN PHOSPHATE 900 MG: 900 INJECTION, SOLUTION INTRAVENOUS at 21:36

## 2019-05-15 RX ADMIN — Medication 999 ML/HR: at 05:26

## 2019-05-15 RX ADMIN — LABETALOL HCL 200 MG: 200 TABLET, FILM COATED ORAL at 08:53

## 2019-05-15 RX ADMIN — LABETALOL HCL 200 MG: 200 TABLET, FILM COATED ORAL at 17:03

## 2019-05-15 RX ADMIN — ACETAMINOPHEN 1000 MG: 10 INJECTION, SOLUTION INTRAVENOUS at 03:45

## 2019-05-15 NOTE — PROGRESS NOTES
Progress Note Patient: Dafne De Anda MRN: [id number]  SSN: [id number] YOB: 1980  Age: 43 y.o. Sex: female Subjective:  
 
Postpartum Day: 0 Vaginal Delivery The patient is without complaints. The patient is ambulating well. The patient  tolerating a normal diet. The baby is well. Objective:  
  
Patient Vitals for the past 8 hrs: 
 BP Temp Pulse Resp SpO2  
05/15/19 1012 123/76      
05/15/19 0845 (!) 141/91 98.3 °F (36.8 °C) 93 16 97 % 05/15/19 0733 133/42 99.3 °F (37.4 °C) (!) 104 18   
05/15/19 0716 142/82  (!) 102    
05/15/19 0631 (!) 132/104  (!) 112    
05/15/19 0618 139/45  (!) 136   LABS: Recent Results (from the past 24 hour(s)) GLUCOSE, POC Collection Time: 05/14/19  2:18 PM  
Result Value Ref Range Glucose (POC) 86 70 - 110 mg/dL GLUCOSE, POC Collection Time: 05/14/19  6:18 PM  
Result Value Ref Range Glucose (POC) 85 70 - 110 mg/dL GLUCOSE, POC Collection Time: 05/14/19 10:24 PM  
Result Value Ref Range Glucose (POC) 98 70 - 110 mg/dL GLUCOSE, POC Collection Time: 05/15/19  2:42 AM  
Result Value Ref Range Glucose (POC) 123 (H) 70 - 110 mg/dL Lab Results Component Value Date/Time HGB 9.2 (L) 05/14/2019 10:02 AM  
 
Lab Results Component Value Date/Time HCT 28.5 (L) 05/14/2019 10:02 AM  
  
Lochia:  appropriate Uterine Fundus:   firm, 0 Lab/Data Review: All lab results for the last 24 hours reviewed. Assessment:  
 
Status post: Doing well postpartum vaginal delivery day 0. Plan:  
 
Continue day 0 post-vaginal delivery orders. Postpartum care discussed including diet, ambulation, and actvitiy restrictions. Signed By: Kristian Adams CNM May 15, 2019

## 2019-05-15 NOTE — PROGRESS NOTES
1920 Bedside shift change report given to Jodeane Spatz, RN (oncoming nurse) by Maria Teresa Tate. Catherine Church RN (offgoing nurse). Report included the following information SBAR, Kardex, Intake/Output, MAR and Recent Results. 1932 CRNA at bedside for bolus. 1940 Patient repositioned in trendelenburg. 1955 Patient turned right lateral with leg in stirrup. 2032 Patient turned left lateral with leg in stirrup. 2108 Patient sat high vargas's. Pitocin up to 16 mu/min. 2115 SBAR discussed POC with Angélica Landry MD. No changes to POC at this time. 2225 Patient turned left lateral with leg in stirrup. Patient given ginger ale per request. BG 98. 
 
2315 SVE 6/80/-1. CRNA paged for bolus. Warm blankets provided per request. 
 
5971 Patient turned right lateral with leg in stirrup. 9029 CRNA at bedside. 0045 Patient repositioned high vargas's. US adjusted. Patient given ginger ale per request. 
 
0126 Patient turned left lateral with leg in stirrup. The documentation for this period (2172-2216) is being entered following the guidelines as defined in the [facility] downtime policy by Jodeane Spatz, RN. 
 
3033 CRNA paged. 1610 CRNA at bedside for epidural bolus. 0877 Patient vomiting. Patient given emesis bag.  
 
0300 Patient refusing to change positions at this time. 0325 Temperature 101.1 axillary. Patient on right side with leg in stirrup. Angélica Landry MD notified of temperature, FHR tracing, and orders received for IV tylenol and gentamycin and clindamycin. 0345 IV tylenol hung. 0425 CRNA paged. Patient refusing cervical check until after CRNA bolus. 0430 CRNA at beside for bolus. 0439 SVE 10/100/+1. K. Angélica Landry MD aware. 404 [facility] MD Eliza at bedside. Patient pushing. 2208 Delivery of viable, male infant. Infant placed on mother's chest. Infant suctioned via bulb and stimulated with warm blankets. 4791 Delivery of intact placenta. Shaq Cervantes MD repairing. 0715 Bedside shift change report given to FLAVIA Swann RN (oncoming nurse) by Tsering Montoya RN (offgoing nurse). Report included the following information SBAR, Kardex, Intake/Output, MAR and Recent Results.

## 2019-05-15 NOTE — L&D DELIVERY NOTE
Delivery Summary    Patient: Salvador Estrada MRN: [id number]  SSN: [id number]    YOB: 1980  Age: 43 y.o. Sex: female       Information for the patient's :  Mary Ann Cooper [[id number]]       Labor Events:    Labor: No    Steroids: None   Cervical Ripening Date/Time:       Cervical Ripening Type: Cervidil   Antibiotics During Labor: Yes   Rupture Identifier:      Rupture Date/Time: 2019 9:33 AM   Rupture Type: AROM   Amniotic Fluid Volume:      Amniotic Fluid Description: Clear    Amniotic Fluid Odor: None    Induction: Oxytocin;AROM       Induction Date/Time:        Indications for Induction: Diabetes    Augmentation: None   Augmentation Date/Time:      Indications for Augmentation:     Labor complications: None       Additional complications:        Delivery Events:  Indications For Episiotomy:     Episiotomy: None   Perineal Laceration(s): 1st   Repaired:     Periurethral Laceration Location:      Repaired:     Labial Laceration Location:     Repaired:     Sulcal Laceration Location:     Repaired:     Vaginal Laceration Location:     Repaired:     Cervical Laceration Location:     Repaired:     Repair Suture: Vicryl 2-0   Number of Repair Packets: 1   Estimated Blood Loss (ml): 300ml     Delivery Date: 5/15/2019    Delivery Time: 4:45 AM  Delivery Type: Vaginal, Spontaneous  Sex:  Male    Gestational Age: 38w3d   Delivery Clinician:  Bret Jones  Living Status: Living   Delivery Location: L&D            APGARS  One minute Five minutes Ten minutes   Skin color: 0   1        Heart rate: 2   2        Grimace: 2   2        Muscle tone: 2   2        Breathin   2        Totals: 8   9            Presentation: Vertex    Position: Right Occiput Anterior  Resuscitation Method:  Suctioning-bulb; Tactile Stimulation     Meconium Stained: None      Cord Information: 3 Vessels  Complications: None  Cord around:    Delayed cord clamping?  Yes  Cord clamped date/time: Disposition of Cord Blood: Lab    Blood Gases Sent?: No    Placenta:  Date/Time:    Removal: Spontaneous      Appearance: Normal      Measurements:  Birth Weight: 8 lb 15 oz (4.055 kg)      Birth Length: 1' 8.5\" (0.521 m)      Head Circumference: 1' 1.78\" (0.35 m)      Chest Circumference: 1' 1.78\" (0.35 m)     Abdominal Girth: 1' 1.58\" (0.345 m)    Other Providers:   SOPHIE PATE;;JOSE JUNA REYNOLDS, Obstetrician;Primary Nurse;Primary  Nurse;Nicu Nurse;Neonatologist;Anesthesiologist;Crna;Nurse Practitioner;Midwife;Nursery Nurse           Group B Strep:   Lab Results   Component Value Date/Time    GrBStrep, External negative 2019     Information for the patient's :  Debora Davis [[id number]]   No results found for: ABORH, PCTABR, PCTDIG, BILI, ABORHEXT, ABORH    No results for input(s): PCO2CB, PO2CB, HCO3I, SO2I, IBD, PTEMPI, SPECTI, PHICB, ISITE, IDEV, IALLEN in the last 72 hours.

## 2019-05-15 NOTE — PROGRESS NOTES
Maci 18 care of patient with Cesilia Cheema, LISA 
 
0762 Bedside and Verbal shift change report given to JAN Burgos (oncoming nurse) by Cesilia Cheema RN and [facility], RN (offgoing nurse). Report included the following information SBAR, Kardex, Intake/Output, MAR and Recent Results.

## 2019-05-15 NOTE — LACTATION NOTE
Per mom, baby hasn't latched yet, but mom does want to breast--baby currently in NICU for antibiotics and blood sugar issues. Mom educated on breastfeeding basics--hunger cues, feeding on demand, waking baby if baby sleeps too long between feeds, importance of skin to skin, positioning and latching, risk of pacifier use and supplemental feedings, and importance of rooming in--and use of log sheet. Mom also educated on benefits of breastfeeding for herself and baby. Mom verbalized understanding. No questions at this time.

## 2019-05-15 NOTE — PROGRESS NOTES
Pharmacy Dosing Services: Gentamicin Auto-Consult for Gentamicin Dosing by Pharmacy by Dr. Debbie Perez Consult provided for this 43y.o. year old female , for indication of OB/GYN Infection. Height / Weight:    
Ht Readings from Last 1 Encounters:  
05/13/19 185.4 cm (73\") Wt Readings from Last 1 Encounters:  
05/13/19 136.1 kg (300 lb) Day of Therapy 1 Scr = 0.52 (5/13/19) Current Dose: Gentamicin 5 mg/kg (based on IBW) IV q24h (ordered by Dr. Debbie Perez), first dose administered 5/15/19 at 05:16 Gentamicin Random level ordered for 5/15/19 at 16:00 (~ 10 hours after initial dose completed infusing). Pharmacy to follow daily and will make changes to dose and/or frequency based on clinical status. Other Current Antibiotics Clindamycin 900 mg IV q8h Serum Creatinine Lab Results Component Value Date/Time Creatinine 0.53 (L) 05/13/2019 05:55 AM  
 
  
Creatinine Clearance Estimated Creatinine Clearance: 224.3 mL/min (A) (based on SCr of 0.53 mg/dL (L)). BUN Lab Results Component Value Date/Time BUN 12 05/13/2019 05:55 AM  
 
   
WBC Lab Results Component Value Date/Time WBC 8.2 05/14/2019 10:02 AM  
 
  
H/H Lab Results Component Value Date/Time HGB 9.2 (L) 05/14/2019 10:02 AM  
 
  
Platelets Lab Results Component Value Date/Time PLATELET 836 31/94/9984 10:02 AM  
 
  
Temp 98.3 °F (36.8 °C) Towana Goldberg, [place]        Contact information: [phone]

## 2019-05-15 NOTE — ANESTHESIA POSTPROCEDURE EVALUATION
5/15/2019 
4:48 PM 
 
Laboring Epidural Follow-up Note Referring physician: Bryce Hill MD  
Patient status post vaginal delivery with labor epidural. 
 
 
Visit Vitals /76 Comment: recheck after BP meds Pulse 93 Temp 36.8 °C (98.3 °F) Resp 16 Ht 6' 1\" (1.854 m) Wt 136.1 kg (300 lb) SpO2 97% Breastfeeding? Unknown BMI 39.58 kg/m² Patient ambulating and voiding without difficulty. Patient denies headache. Patient denies backache. Maria Del Carmen Sparks, CRNA
No

## 2019-05-15 NOTE — PROGRESS NOTES
TRANSFER - IN REPORT: 
 
Verbal report received from  [address] [place] [address] Road (name) on Brii Sparks  being received from L&D(unit) for routine progression of care Report consisted of patients Situation, Background, Assessment and  
Recommendations(SBAR). Information from the following report(s) SBAR was reviewed with the receiving nurse. Opportunity for questions and clarification was provided. Assessment completed upon patients arrival to unit and care assumed. Orientated patient to room, call bell within reach. Will continue to monitor

## 2019-05-15 NOTE — PROGRESS NOTES
0715 Bedside and Verbal shift change report given to [address] [place] [address] Dr RN  (oncoming nurse) by Genna Werner RN (offgoing nurse). Report included the following information SBAR, Kardex, Procedure Summary, Intake/Output, MAR, Recent Results and Med Rec Status. 0813 Up to bathroom to void prior to discharge. New gown and pad. TRANSFER - OUT REPORT: 
 
Verbal report given to FRANTZ Alford RN(name) on Erna Mitchell  being transferred to [facility] (unit) for routine progression of care Report consisted of patients Situation, Background, Assessment and  
Recommendations(SBAR). Information from the following report(s) SBAR, Kardex, Procedure Summary, Intake/Output, MAR, Recent Results and Med Rec Status was reviewed with the receiving nurse. Lines:  
Peripheral IV 05/14/19 Left;Posterior Hand (Active) Opportunity for questions and clarification was provided. Patient transported with: 
 Registered Nurse

## 2019-05-16 ENCOUNTER — APPOINTMENT (OUTPATIENT)
Dept: GENERAL RADIOLOGY | Age: 39
DRG: 560 | End: 2019-05-16
Attending: OBSTETRICS & GYNECOLOGY
Payer: MEDICAID

## 2019-05-16 LAB
ATRIAL RATE: 82 BPM
CALCULATED P AXIS, ECG09: 17 DEGREES
CALCULATED R AXIS, ECG10: 50 DEGREES
CALCULATED T AXIS, ECG11: 53 DEGREES
DIAGNOSIS, 93000: NORMAL
GLUCOSE BLD STRIP.AUTO-MCNC: 103 MG/DL (ref 70–110)
GLUCOSE BLD STRIP.AUTO-MCNC: 111 MG/DL (ref 70–110)
GLUCOSE BLD STRIP.AUTO-MCNC: 111 MG/DL (ref 70–110)
GLUCOSE P FAST SERPL-MCNC: 85 MG/DL (ref 74–99)
HCT VFR BLD AUTO: 24.9 % (ref 35–45)
HGB BLD-MCNC: 8 G/DL (ref 12–16)
P-R INTERVAL, ECG05: 162 MS
Q-T INTERVAL, ECG07: 364 MS
QRS DURATION, ECG06: 70 MS
QTC CALCULATION (BEZET), ECG08: 425 MS
VENTRICULAR RATE, ECG03: 82 BPM

## 2019-05-16 PROCEDURE — 65270000029 HC RM PRIVATE

## 2019-05-16 PROCEDURE — 82962 GLUCOSE BLOOD TEST: CPT

## 2019-05-16 PROCEDURE — 93005 ELECTROCARDIOGRAM TRACING: CPT

## 2019-05-16 PROCEDURE — 85018 HEMOGLOBIN: CPT

## 2019-05-16 PROCEDURE — 74011250637 HC RX REV CODE- 250/637: Performed by: OBSTETRICS & GYNECOLOGY

## 2019-05-16 PROCEDURE — 36415 COLL VENOUS BLD VENIPUNCTURE: CPT

## 2019-05-16 PROCEDURE — 82947 ASSAY GLUCOSE BLOOD QUANT: CPT

## 2019-05-16 PROCEDURE — 74011250636 HC RX REV CODE- 250/636: Performed by: OBSTETRICS & GYNECOLOGY

## 2019-05-16 PROCEDURE — 77030027138 HC INCENT SPIROMETER -A

## 2019-05-16 PROCEDURE — 74011000258 HC RX REV CODE- 258: Performed by: OBSTETRICS & GYNECOLOGY

## 2019-05-16 PROCEDURE — 71045 X-RAY EXAM CHEST 1 VIEW: CPT

## 2019-05-16 PROCEDURE — 85014 HEMATOCRIT: CPT

## 2019-05-16 PROCEDURE — 85461 HEMOGLOBIN FETAL: CPT

## 2019-05-16 PROCEDURE — 86900 BLOOD TYPING SEROLOGIC ABO: CPT

## 2019-05-16 RX ADMIN — CLINDAMYCIN PHOSPHATE 900 MG: 900 INJECTION, SOLUTION INTRAVENOUS at 05:15

## 2019-05-16 RX ADMIN — CLINDAMYCIN PHOSPHATE 900 MG: 900 INJECTION, SOLUTION INTRAVENOUS at 22:19

## 2019-05-16 RX ADMIN — LABETALOL HCL 200 MG: 200 TABLET, FILM COATED ORAL at 22:19

## 2019-05-16 RX ADMIN — CLINDAMYCIN PHOSPHATE 900 MG: 900 INJECTION, SOLUTION INTRAVENOUS at 13:07

## 2019-05-16 RX ADMIN — HUMAN RHO(D) IMMUNE GLOBULIN 0.3 MG: 300 INJECTION, SOLUTION INTRAMUSCULAR at 12:46

## 2019-05-16 RX ADMIN — IBUPROFEN 800 MG: 400 TABLET, FILM COATED ORAL at 13:06

## 2019-05-16 RX ADMIN — LABETALOL HCL 200 MG: 200 TABLET, FILM COATED ORAL at 17:32

## 2019-05-16 RX ADMIN — LABETALOL HCL 200 MG: 200 TABLET, FILM COATED ORAL at 09:34

## 2019-05-16 RX ADMIN — GENTAMICIN SULFATE 377.2 MG: 40 INJECTION, SOLUTION INTRAMUSCULAR; INTRAVENOUS at 05:57

## 2019-05-16 RX ADMIN — VITAMIN A, VITAMIN C, VITAMIN D-3, VITAMIN E, VITAMIN B-1, VITAMIN B-2, NIACIN, VITAMIN B-6, CALCIUM, IRON, ZINC, COPPER 1 TABLET: 4000; 120; 400; 22; 1.84; 3; 20; 10; 1; 12; 200; 27; 25; 2 TABLET ORAL at 09:34

## 2019-05-16 NOTE — DISCHARGE SUMMARY
Progress Note    Patient: Dennis Arboleda MRN: [id number]  SSN: [id number]    YOB: 1980  Age: 43 y.o. Sex: female      Subjective:     Postpartum Day: 1 Vaginal Delivery    The patient is without complaints. The patient is ambulating well. The patient  tolerating a normal diet. The baby is well. Objective:      Patient Vitals for the past 8 hrs:   BP Temp Pulse Resp SpO2   05/16/19 0829 136/82 97.9 °F (36.6 °C) (!) 113 18 100 %   05/16/19 0300 134/84 98 °F (36.7 °C) 86 18 100 %     LABS: Recent Results (from the past 24 hour(s))   CREATININE    Collection Time: 05/15/19  5:15 PM   Result Value Ref Range    Creatinine 0.83 0.6 - 1.3 MG/DL    GFR est AA >60 >60 ml/min/1.73m2    GFR est non-AA >60 >60 ml/min/1.73m2   GENTAMICIN, RANDOM    Collection Time: 05/15/19  5:15 PM   Result Value Ref Range    Gentamicin,random 1.0 0.5 - 10 ug/ml   HEMOGLOBIN    Collection Time: 05/16/19  4:05 AM   Result Value Ref Range    HGB 8.0 (L) 12.0 - 16.0 g/dL   HEMATOCRIT    Collection Time: 05/16/19  4:05 AM   Result Value Ref Range    HCT 24.9 (L) 35.0 - 45.0 %   GLUCOSE, FASTING    Collection Time: 05/16/19  4:05 AM   Result Value Ref Range    Glucose 85 74 - 99 MG/DL   GLUCOSE, POC    Collection Time: 05/16/19  5:18 AM   Result Value Ref Range    Glucose (POC) 103 70 - 110 mg/dL   RH IMMUNE GLOBULIN EVAL-LAB ORDER    Collection Time: 05/16/19  7:40 AM   Result Value Ref Range    ABO/Rh(D) O NEGATIVE     Fetal screen NEG     Cord Blood Type O  NEG       CALLED TO: LISA GEORGE 2N ON 5/16/2019 0851 BY [facility]     Unit number [id number]     Blood component type RH IMMUNE GLOBULIN     Unit division 00     Status of unit ALLOCATED           Lochia:  appropriate   Uterine Fundus:   firm, -2     Lab/Data Review: All lab results for the last 24 hours reviewed. Assessment:     Status post: Doing well postpartum vaginal delivery day 1. Plan:     Continue day 1 post-vaginal delivery orders.  Postpartum care discussed including diet, ambulation, and actvitiy restrictions.      Signed By: Lolly Guardado MD     May 16, 2019

## 2019-05-16 NOTE — PROGRESS NOTES
Bedside shift change report given to Pauline Pinon (oncoming nurse) by Barry Urbano (offgoing nurse). Report included the following information SBAR, Kardex, Intake/Output, MAR and Recent Results.

## 2019-05-16 NOTE — ROUTINE PROCESS
Bedside and Verbal shift change report given to FLAVIA Jeronimo RN (oncoming nurse) by Armen Yañez RN (offgoing nurse). Report included the following information SBAR, Kardex, Procedure Summary, Intake/Output, MAR, Recent Results and Med Rec Status. 1007: D/C Glucola 1 hr per Dr. Stalin Obregon sitting next to me. Will check POC Glucose at 1030. Patient was eating breakfast at 0930.  
 
1424: Patient called out stating she is having shortness of breath upon waking. VSS. 02 100: on RA and lungs clear. 1428: Called Dr. Stalin Obregon and obtained orders for stat EKG, Portable chest x ray and POC glucose. 1443: EKG at bedside, . 
 
1444: Chest XR here.

## 2019-05-16 NOTE — ROUTINE PROCESS
2140-- assessment completed as document pily flow sheet. Plan of care discussed with pt. Pt verbalizes understanding. Express no needs at this time. Call light to reach. 0300- Answered call light. PT. C/o of shortness of  Breath after she wake up from sleep. VS. WNL. Upon assesment Lungs Clear B/L. NO distress or shortness of breath observed. Encourage pt. Deep breathing exercise. Denies any pain. Will monitor. 0700- Bedside and Verbal shift change report given to FLAVIA Caraballo RN (oncoming nurse) by Rosas Moreno RN (offgoing nurse). Report included the following information SBAR, Kardex and MAR.

## 2019-05-16 NOTE — PROGRESS NOTES
Gentamicin random level = 1.0 @ 1715 on 0515/2019    within therapeutic range  Pharmacy shall continue to monitor and adjust based on clinical status and outcomes

## 2019-05-17 VITALS
BODY MASS INDEX: 39.68 KG/M2 | DIASTOLIC BLOOD PRESSURE: 77 MMHG | RESPIRATION RATE: 18 BRPM | HEIGHT: 72 IN | WEIGHT: 293 LBS | SYSTOLIC BLOOD PRESSURE: 125 MMHG | OXYGEN SATURATION: 98 % | HEART RATE: 101 BPM | TEMPERATURE: 98.4 F

## 2019-05-17 LAB
ABO + RH BLD: NORMAL
ABO + RH BLDCO: NORMAL
ANION GAP SERPL CALC-SCNC: 8 MMOL/L (ref 3–18)
BLD PROD TYP BPU: NORMAL
BPU ID: NORMAL
BUN SERPL-MCNC: 14 MG/DL (ref 7–18)
BUN/CREAT SERPL: 19 (ref 12–20)
CALCIUM SERPL-MCNC: 8.4 MG/DL (ref 8.5–10.1)
CALLED TO:,BCALL1: NORMAL
CHLORIDE SERPL-SCNC: 105 MMOL/L (ref 100–108)
CO2 SERPL-SCNC: 27 MMOL/L (ref 21–32)
CREAT SERPL-MCNC: 0.74 MG/DL (ref 0.6–1.3)
FETAL SCREEN,FMHS: NORMAL
GLUCOSE SERPL-MCNC: 109 MG/DL (ref 74–99)
POTASSIUM SERPL-SCNC: 4.2 MMOL/L (ref 3.5–5.5)
SODIUM SERPL-SCNC: 140 MMOL/L (ref 136–145)
STATUS OF UNIT,%ST: NORMAL
UNIT DIVISION, %UDIV: 0

## 2019-05-17 PROCEDURE — 74011250637 HC RX REV CODE- 250/637: Performed by: OBSTETRICS & GYNECOLOGY

## 2019-05-17 PROCEDURE — 36415 COLL VENOUS BLD VENIPUNCTURE: CPT

## 2019-05-17 PROCEDURE — 80048 BASIC METABOLIC PNL TOTAL CA: CPT

## 2019-05-17 PROCEDURE — 74011000258 HC RX REV CODE- 258: Performed by: OBSTETRICS & GYNECOLOGY

## 2019-05-17 PROCEDURE — 74011250636 HC RX REV CODE- 250/636: Performed by: OBSTETRICS & GYNECOLOGY

## 2019-05-17 RX ORDER — IBUPROFEN 800 MG/1
800 TABLET ORAL
Qty: 60 TAB | Refills: 1 | Status: SHIPPED | OUTPATIENT
Start: 2019-05-17 | End: 2021-10-11

## 2019-05-17 RX ORDER — LABETALOL 200 MG/1
200 TABLET, FILM COATED ORAL DAILY
Qty: 30 TAB | Refills: 0 | Status: SHIPPED | OUTPATIENT
Start: 2019-05-17 | End: 2021-06-11 | Stop reason: ALTCHOICE

## 2019-05-17 RX ADMIN — VITAMIN A, VITAMIN C, VITAMIN D-3, VITAMIN E, VITAMIN B-1, VITAMIN B-2, NIACIN, VITAMIN B-6, CALCIUM, IRON, ZINC, COPPER 1 TABLET: 4000; 120; 400; 22; 1.84; 3; 20; 10; 1; 12; 200; 27; 25; 2 TABLET ORAL at 08:59

## 2019-05-17 RX ADMIN — LABETALOL HCL 200 MG: 200 TABLET, FILM COATED ORAL at 08:59

## 2019-05-17 RX ADMIN — CLINDAMYCIN PHOSPHATE 900 MG: 900 INJECTION, SOLUTION INTRAVENOUS at 06:17

## 2019-05-17 RX ADMIN — GENTAMICIN SULFATE 377.2 MG: 40 INJECTION, SOLUTION INTRAMUSCULAR; INTRAVENOUS at 09:51

## 2019-05-17 NOTE — PROGRESS NOTES
Discharge instructions reviewed, copies given. Questions answered. E-sign done. Patient will be discharged home with baby.

## 2019-05-17 NOTE — DISCHARGE INSTRUCTIONS
POST DELIVERY DISCHARGE INSTRUCTIONS    Name: Luke Yanez  YOB: 1980  Primary Diagnosis: Principal Problem:    Gestational diabetes (5/13/2019)    Active Problems:    Obesity (5/13/2019)      IUP (intrauterine pregnancy), incidental (5/13/2019)      Advanced maternal age in multigravida (5/13/2019)      Normal spontaneous vaginal delivery (5/15/2019)      Perineal laceration, first degree, delivered (5/15/2019)      Anemia during pregnancy (5/15/2019)      Maternal fever during labor (5/15/2019)        General:     Diet/Diet Restrictions:  Eight 8-ounce glasses of fluid daily (water, juices); avoid excessive caffeine intake. Meals/snacks as desired which are high in fiber and carbohydrates and low in fat and cholesterol. Physical Activity / Restrictions / Safety:     Avoid heavy lifting, no more that 8 lbs. For 2-3 weeks; limit use of stairs to 2 times daily for the first week home. No driving for one week. Avoid intercourse 4-6 weeks, no douching or tampon use. Check with obstetrician before starting or resuming an exercise program.         Discharge Instructions/Special Treatment/Home Care Needs:     Continue prenatal vitamins. Continue to use squirt bottle with warm water on your episiotomy after each bathroom use until bleeding stops. If steri-strips applied to your incision, remove in 7-10 days. Call your doctor for the following:     Fever over 101 degrees by mouth. Vaginal bleeding heavier than a normal menstrual period or clot larger than a golf ball. Red streaks or increased swelling of legs, painful red streaks on your breast.  Painful urination, constipation and increased pain or swelling or discharge with your incision. If you feel extremely anxious or overwhelmed. If you have thoughts of harming yourself and/or your baby. Pain Management:     Pain Management:   Take Acetaminophen (Tylenol) or Ibuprofen (Advil, Motrin), as directed for pain.  Use a warm Sitz bath 3 times daily to relieve episiotomy or hemorrhoidal discomfort. Heating pad to  incision as needed. For hemorrhoidal discomfort, use Tucks and Anusol cream as needed and directed. Follow-Up Care: These are general instructions for a healthy lifestyle:    No smoking/ No tobacco products/ Avoid exposure to second hand smoke    Surgeon General's Warning:  Quitting smoking now greatly reduces serious risk to your health. Obesity, smoking, and sedentary lifestyle greatly increases your risk for illness    A healthy diet, regular physical exercise & weight monitoring are important for maintaining a healthy lifestyle    Recognize signs and symptoms of STROKE:    F-face looks uneven    A-arms unable to move or move unevenly    S-speech slurred or non-existent    T-time-call 911 as soon as signs and symptoms begin-DO NOT go       Back to bed or wait to see if you get better-TIME IS BRAIN. Patient discharged without removing armband and transfered to another healthcare acute, sub acute , or extended care facility. Informed of privacy risks if armband lost or stolen   [employer] Activation    Thank you for requesting access to [employer]. Please follow the instructions below to securely access and download your online medical record. [employer] allows you to send messages to your doctor, view your test results, renew your prescriptions, schedule appointments, and more. How Do I Sign Up? 1. In your internet browser, go to [email or web address]. 2. Click on the First Time User? Click Here link in the Sign In box. You will see the New Member Sign Up page. 3. Enter your [employer] Access Code exactly as it appears below. You will not need to use this code after youve completed the sign-up process. If you do not sign up before the expiration date, you must request a new code.     [employer] Access Code: [email or web address]  Expires: 2019  6:16 PM (This is the date your [employer] access code will )    4. Enter the last four digits of your Social Security Number (xxxx) and Date of Birth (mm/dd/yyyy) as indicated and click Submit. You will be taken to the next sign-up page. 5. Create a [employer] ID. This will be your [employer] login ID and cannot be changed, so think of one that is secure and easy to remember. 6. Create a [employer] password. You can change your password at any time. 7. Enter your Password Reset Question and Answer. This can be used at a later time if you forget your password. 8. Enter your e-mail address. You will receive e-mail notification when new information is available in [address] 9. Click Sign Up. You can now view and download portions of your medical record. 10. Click the Download Summary menu link to download a portable copy of your medical information. Additional Information    If you have questions, please visit the Frequently Asked Questions section of the [employer] website at [email or web address] Remember, [employer] is NOT to be used for urgent needs. For medical emergencies, dial 911.

## 2019-05-17 NOTE — LACTATION NOTE
Breastfeeding discharge teaching completed to include feeding on demand, foremilk and hindmilk importance, engorgement, mastitis, clogged ducts, pumping, breastmilk storage, and returning to work. Information given about unit and office phone numbers and encouraged mom to reach out if concerns arise, but that [facility] would be calling her in the next few days to follow up on breastfeeding. Mom verbalized understanding and no questions at this time.

## 2019-05-17 NOTE — PROGRESS NOTES
1550 Received handoff report from FLAVIA Matthews RN via [employer]. Patient in stable condition. Identification bands verified. Currently resting in room. No further needs reported at this time. Will continue to monitor frequently. 1945 Bedside and Verbal shift change report given to JAN Cohen RN (oncoming nurse) by ANDREW Mcelroy RN (offgoing nurse). Report included the following information SBAR, Kardex, Intake/Output, MAR and Recent Results.

## 2019-05-17 NOTE — PROGRESS NOTES
Progress Note Patient: Paulina Mireles MRN: [id number]  SSN: [id number] YOB: 1980  Age: 43 y.o. Sex: female Subjective:  
 
Postpartum Day: 2 Vaginal Delivery The patient is without complaints. The patient is ambulating well. The patient  tolerating a normal diet. Flatus has been passed. The baby is well. Objective:  
  
Patient Vitals for the past 8 hrs: 
 BP Temp Pulse Resp SpO2  
05/17/19 0859 125/77 98.4 °F (36.9 °C) (!) 101 18   
05/17/19 0621 121/73 98.4 °F (36.9 °C) 92 18 98 % LABS: Recent Results (from the past 24 hour(s)) GLUCOSE, POC Collection Time: 05/16/19 10:31 AM  
Result Value Ref Range Glucose (POC) 111 (H) 70 - 110 mg/dL GLUCOSE, POC Collection Time: 05/16/19  2:41 PM  
Result Value Ref Range Glucose (POC) 111 (H) 70 - 110 mg/dL EKG, 12 LEAD, INITIAL Collection Time: 05/16/19  2:46 PM  
Result Value Ref Range Ventricular Rate 82 BPM  
 Atrial Rate 82 BPM  
 P-R Interval 162 ms QRS Duration 70 ms Q-T Interval 364 ms QTC Calculation (Bezet) 425 ms Calculated P Axis 17 degrees Calculated R Axis 50 degrees Calculated T Axis 53 degrees Diagnosis Normal sinus rhythm Normal ECG Confirmed by Jayna Mcmillan MD, Keith Connors ([id number]) on 5/16/2019 8:24:68 PM 
  
METABOLIC PANEL, BASIC Collection Time: 05/17/19  2:15 AM  
Result Value Ref Range Sodium 140 136 - 145 mmol/L Potassium 4.2 3.5 - 5.5 mmol/L Chloride 105 100 - 108 mmol/L  
 CO2 27 21 - 32 mmol/L Anion gap 8 3.0 - 18 mmol/L Glucose 109 (H) 74 - 99 mg/dL BUN 14 7.0 - 18 MG/DL Creatinine 0.74 0.6 - 1.3 MG/DL  
 BUN/Creatinine ratio 19 12 - 20 GFR est AA >60 >60 ml/min/1.73m2 GFR est non-AA >60 >60 ml/min/1.73m2 Calcium 8.4 (L) 8.5 - 10.1 MG/DL Lochia:  appropriate Uterine Fundus:   firm -3 Lab/Data Review: All lab results for the last 24 hours reviewed. Assessment: Status post: Doing well postpartum vaginal delivery day 2. Plan:  
 
Continue day 2 post-vaginal delivery orders, discharge today. Postpartum care discussed including diet, ambulation, and actvitiy restrictions. Patient is not breastfeeding. Continue pre- vitamins. Medications as per MRF and discharge instructions. . Follow-up in six weeks. Signed By: Samuel Rosa MD   
 May 17, 2019

## 2020-05-22 ENCOUNTER — VIRTUAL VISIT (OUTPATIENT)
Dept: SURGERY | Age: 40
End: 2020-05-22

## 2020-05-22 VITALS — BODY MASS INDEX: 39.68 KG/M2 | HEIGHT: 72 IN | WEIGHT: 293 LBS

## 2020-05-22 DIAGNOSIS — O99.019 ANEMIA DURING PREGNANCY: ICD-10-CM

## 2020-05-22 DIAGNOSIS — R73.03 PREDIABETES: ICD-10-CM

## 2020-05-22 DIAGNOSIS — O24.419 GESTATIONAL DIABETES MELLITUS (GDM), ANTEPARTUM, GESTATIONAL DIABETES METHOD OF CONTROL UNSPECIFIED: ICD-10-CM

## 2020-05-22 DIAGNOSIS — E66.01 MORBID OBESITY WITH BMI OF 40.0-44.9, ADULT (HCC): ICD-10-CM

## 2020-05-22 DIAGNOSIS — E66.01 MORBID OBESITY (HCC): Primary | ICD-10-CM

## 2020-05-22 DIAGNOSIS — D50.8 OTHER IRON DEFICIENCY ANEMIA: ICD-10-CM

## 2020-05-22 RX ORDER — LANOLIN ALCOHOL/MO/W.PET/CERES
CREAM (GRAM) TOPICAL
COMMUNITY

## 2020-05-22 NOTE — PATIENT INSTRUCTIONS

## 2020-05-22 NOTE — PROGRESS NOTES
Bariatric Surgery Consultation    Subjective: The patient is a 35 y.o. obese female with a Body mass index is 41.09 kg/m². .  The patient is at her heaviest weight for the past 5 years. she has been overweight since age 28.   she has been considering surgery since last year. she desires surgery at this time because of multiple health concerns and their lifestyle issues which are hindered by their weight. she has been referred by his family physician  for evaluation and treatment of their obesity via surgical intervention. Tsosie Aase has tried multiple diets in her lifetime most recently tried physician supervised, behavior modification, unsupervised diets and Atkins    Bariatric comorbidities present are   Patient Active Problem List   Diagnosis Code    Gestational diabetes O23.80    Obesity E66.9    IUP (intrauterine pregnancy), incidental Z33.3    Advanced maternal age in multigravida O12.46    Normal spontaneous vaginal delivery O80    Perineal laceration, first degree, delivered O70.0    Anemia during pregnancy O99.019    Maternal fever during labor O75.2    Morbid obesity ([address] E66.01    Morbid obesity with BMI of 40.0-44.9, adult ([address] E66.01, Z68.41    Prediabetes R73.03       The patient is considering laparoscopic sleeve gastrectomy for surgical weight loss due to their ineffective progress with medical forms of weight loss and the urging of their physician who cares for their primary medical issues. The patient  now presents  for consideration for weight loss surgery understanding the benefits of this over a medical approach of weight loss as was discussed in our presentation on weight loss surgery. They have discussed their plans both with their family and primary care physician who is in support of their pursuit of such. The patient has not had health issues as of late and denies and gastrointestinal disturbances other than what is outlined below in their review of symptoms.  All of their prior evaluations available by both their PCP's and specialists physicians have been reviewed today either in the Care Everywhere portal or scanned under the media tab. I have spent a large portion of my initial consultation today reviewing the patients current dietary habits which have contributed to their health issues and obesity. I have suggested to them personally a dietary regimen that they can initiate now to help with their status as it pertains to their weight. They understand that the most important aspect of their journey through their weight loss endeavor will be their adherence to a new lifestyle of healthy eating behavior. They also understand that an adherence to an exercise program will not only help with weight loss but is ultimately important in weight maintenance. The patients goal weight is 210lb. These goals are consistent with expected outcomes of their desired operation. her Medical goals are resolution of these health issues. Patient Active Problem List    Diagnosis Date Noted    Morbid obesity ([address]     Morbid obesity with BMI of 40.0-44.9, adult ([facility]     Prediabetes     Normal spontaneous vaginal delivery 05/15/2019    Perineal laceration, first degree, delivered 05/15/2019    Anemia during pregnancy 05/15/2019    Maternal fever during labor 05/15/2019    Gestational diabetes 2019    Obesity 2019    IUP (intrauterine pregnancy), incidental 2019    Advanced maternal age in multigravida 2019     No past surgical history on file. Social History     Tobacco Use    Smoking status: Former Smoker     Last attempt to quit: 2018     Years since quittin.8    Smokeless tobacco: Never Used   Substance Use Topics    Alcohol use: Not Currently      No family history on file. Current Outpatient Medications   Medication Sig Dispense Refill    ferrous sulfate 325 mg (65 mg iron) tablet Take  by mouth Daily (before breakfast).       ibuprofen (MOTRIN) 800 mg tablet Take 1 Tab by mouth every eight (8) hours as needed for Pain. 60 Tab 1    PNV No12-Iron-FA-DSS-OM-3 29 mg iron-1 mg -50 mg CPKD Take 1 Tab by mouth.  labetalol (NORMODYNE) 200 mg tablet Take 1 Tab by mouth daily.  Indications: high blood pressure 30 Tab 0     No Known Allergies       Review of Systems:       General - No history or complaints of unexpected fever, chills, or weight loss  Head/Neck - No history or complaints of headache, diplopia, dysphagia, hearing loss  Cardiac - No history or complaints of chest pain, palpitations, murmur, or shortness of breath  Pulmonary - No history or complaints of shortness of breath, productive cough, hemoptysis  Gastrointestinal -mild reflux,no  abdominal pain, obstipation/constipation or blood per rectum  Genitourinary - No history or complaints of hematuria/dysuria, stress urinary incontinence symptoms, or renal lithiasis  Musculoskeletal - mild joint pain in their knees,  no muscular weakness  Hematologic - No history or complaints of bleeding disorders,  No blood transfusions  Neurologic - No history or complaints of  migraine headaches, seizure activity, syncopal episodes, TIA or stroke  Integumentary - No history or complaints of rashes, abnormal nevi, skin cancer  Gynecological - normal menses               Objective:     Visit Vitals  Ht 6' (1.829 m)   Wt 137.4 kg (303 lb)   BMI 41.09 kg/m²       Physical Examination:       Physical Examination: General appearance - alert, well appearing, and in no distress and oriented to person, place, and time  Mental status - alert, oriented to person, place, and time, normal mood, behavior, speech, dress, motor activity, and thought processes  Eyes - pupils equal and reactive, extraocular eye movements intact, sclera anicteric, left eye normal, right eye normal  Ears - right ear normal, left ear normal  Neck- good extension and flexion, no obvious swelling  Chest - good air movement  Heart - N/A  Abdomen - no obvious distension. Neurological - alert, oriented, normal speech, no focal findings or movement disorder noted  Musculoskeletal - no swelling noted  Extremities - normal movement    Labs:       No results found for this or any previous visit (from the past 1440 hour(s)). Assessment:     Morbid obesity with comorbidity    Plan:     laparoscopic sleeve gastrectomy    This is a 35 y.o. female with a BMI of Body mass index is 41.09 kg/m². and the weight-related co-morbidties as noted below. Hilario Alfaro meets the NIH criteria for bariatric surgery based upon the BMI of Body mass index is 41.09 kg/m². and multiple weight-related co-morbidties. Hilario Alfaro has elected laparoscopic sleeve gastrectomy as her intervention of choice for treatment of morbid obestiy through surgical means secondary to its safety profile, rapid return to work  and decreases in operative risks over gastric bypass. In the office today, following Deyanira's history and physical examination, a 30 minute discussion regarding the anatomic alterations for the laparoscopic sleeve gastrectomy was undertaken. The dietary expectations and the patient and physician dependent factors for success were thoroughly discussed, to include the need for interval follow-up and long-term dietary changes associated with success. The possible complications of the sleeve gastrectomy  were also discussed, to include;death, DVT/PE, staple line leak, bleeding, stricture formation, infection, nutritional deficiencies and sleeve dilation. Specific weight related outcomes for success were also discussed with an emphasis on careful and close follow-up with the first year and eating behavior modification as the baseline and cyclical hunger return. The patient expressed an understanding of the above factors, and her questions were answered in their entirety.     In addition, the patient attended a 1.5 hour power point seminar regarding obesity, surgical weight loss including, adjustable gastric band, gastric bypass, and sleeve gastrectomy. This discussion contrasted the different surgical techniques, mechanisms of actions and expected outcomes, and surgical and medical risks associated with each procedure. During this seminar, there was a long question and answer session where each questions was answered until there were no additional questions. Today, the patient had all of her questions answered and desires to proceed with  bariatric surgery initially choosing sleeve gastrectomy as her surgical option. Secondary Diagnoses:     Dietary Intervention  - The patient is currently scheduled to see or has been followed by a bariatric nutritionist for an attempt at preoperative weight loss as has been dictated by their insurance carrier. They will be assessed at various times during their follow up to evaluate their progress depending on the length of time that is required once again by their carrier. I have explained the importance of   preoperative weight loss and the benefits regarding lower surgical risk and also assisting the patient in reaching their weight loss goal. They understand also that they should participate in an  exercise program to assist in this weight loss. Finally they understand there is a physiologic benefit from the standpoint of hepatic volume reduction and reduction of central visceral adiposity   preoperatively. I have reiterated the importance of a low carbohydrate and high protein regimen to achieve their   stated goal. I have reviewed their current eating behavior prior to this encounter and explained to them in an exhaustive fashion the appropriate diet that they should adhere to. They have been   encouraged to loose weight pre operatively and understand it is our prerogative to cancel surgery or postpone their procedure in the event of significant weight gain.        Adult Onset Diabetes - The patient has aleyda given a very low carbohydrate diet preoperatively along with instructions to monitor their blood sugars on a regular   daily basis. When  their surgery is performed  we will be monitoring the patient with sliding scale insulin and accuchecks. Based on those values we will determine   whether the patient needs a reduction of those medications postoperatively or total removal of those medications on discharge. We will have the patient continue   accuchecks postoperatively while at home also and report to me or their family physician for appropriate adjustments as needed. The patient also understands   that in the event of uncontrolled blood sugar preoperatively that we may choose to postpone their surgery. Signed By: Robbie Nina MD     May 22, 2020       This visit with Florentin Martínez was performed under virtual telemedicine guidelines during the coronavirus (WMLMI-76) public health emergency on 5/22/2020 in an interactive   fashion using [employer]. me. They understand that this telemedicine encounter is a billable service, with coverage determined by their insurance carrier. They are aware that   they may receive a bill and have provided verbal consent for this virtual visit. This visit was performed with the patient in their home environment and provider was   present at [facility]. I have spent over 60 minutes on this visit  both prior to the visits reviewing the patients chart and with the patient face to face. I have reviewed their medical history, performed a telemedicine physical examination, and discussed the plan of action to date. They understand that they will be asked   to come to the office when our office is allowed normal patient interaction, as dictated by public health officials, for a face-to-face visit to rediscuss all of the things we  have talked about today.   During this visit we discussed the varieties of surgeries that we perform, how they would impact the patient from a weight loss standpoint   considering their medical issues and prior surgeries, and also the restrictions that the patient would have long-term with the operation that they have chosen.     Calvin Voss M.D.  5/22/2020

## 2020-06-18 ENCOUNTER — CLINICAL SUPPORT (OUTPATIENT)
Dept: SURGERY | Age: 40
End: 2020-06-18

## 2020-06-18 VITALS — HEIGHT: 72 IN | BODY MASS INDEX: 39.68 KG/M2 | WEIGHT: 293 LBS

## 2020-06-18 DIAGNOSIS — E66.01 MORBID OBESITY (HCC): Primary | ICD-10-CM

## 2020-06-18 NOTE — PROGRESS NOTES
Medical Weight Loss Multi-Disciplinary Program    Name: Jemima Sexton   : 1980    Session# 1  Date: 2020     Height: 6' (182.9 cm)    Weight: 137.4 kg (303 lb) lbs. Body mass index is 41.09 kg/m². Patient does report she did restart smoking, reinforced the importance of smoking cessations       Dietitian: Nayely Murray is a 35 y.o. female who present for a pre-op evaluation. Visit Vitals  Ht 6' (1.829 m)   Wt 137.4 kg (303 lb)   BMI 41.09 kg/m²     Past Medical History:   Diagnosis Date    Anemia     Morbid [address] [address]     Morbid obesity with BMI of 40.0-44.9, adult ([facility] [address]     Prediabetes            Procedure:  laparoscopic sleeve gastrectomy     Reasons for Surgery:  BMI > 40 with one or more medically significant comorbidities    Summary:  Pt given brief pre/post-op diet ed and diet hx reviewed. Pt instructed to follow a low calorie, low carbohydrate, high protein diet of about 5025-9642 calories daily. Pt set several goals. See below. What have you done in the past to try to lose weight? Calorie controlled diet, exercise program, portion control     Why didn't you lose weight or keep the weight off?: Patient has not been able to lose weight with exercise and diet     Why do you think having weight loss surgery will make it possible for you to lose weight and keep it off? To take control of her health and prevent on set weight related complications    Dietary Instructions    Nutritional Hx: What is the number of meals you eat per day? 3  Comment:     Do you eat between meals / snack? yes  Typical snack: fruit, yogut    How fast do you eat your meals? rapid    How often do you eat fast food? 1 times a week    How many sodas/sugared beverages do you drink per day? 1- glass of soda     How many caffeinated drinks do you have per day? Soda, coffee     How much milk and/or juice do you drink per day? Avoiding juice -     How much water do you drink per day?  8-10 (8oz glasses)    How often do you consume alcohol? None    Current Vitamins: Womens health vitamin D    Diet History:    Typical intake is as follows:  Wakes up at 5:00 am   Breakfast: 5:30 am -1 egg with ham and cheese on english muffin, coffee with sweet creamer OR half and half with 3 tsp sugar - water   Snack: dannon strawberry/banana yogurt  Lunch: Pasta - 1 cup (tri color veggie noodles) with kielbasa sausage and shrimp with cheese pesto sauce   Dinner: Steak with broccoli and potatoes - Typically has protein, 2 serving of veggies and starch at evening meal   Snacks: fruit - blackberries OR raspberries   Fluids: Water - 5 bottles of water, 1 glass of soda per day     Reviewed intake  Understanding low carbohydrates, low sugar, higher protein meals  Understanding proper portions  Dining outside home  Instruction given for personal dietary changes  Discussed perceived compliance  Comments: Pt given brief pre/post-op diet ed and diet hx reviewed. Patient Education and Materials Provided:  Supplement [facility], B Vitamin Information, MVI Recommendations, Calcium Citrate Information, Bariatric Supplement Companies, Protein Supplement Information, Fluid Requirements, No Caffeine or Carbonation, No Alcohol for One Year Post Op, 3 Balanced Meals a Day, Food Group Guide, Good Choices Dining Out, No Snacks, No Concentrated Sweets, Support System at Home, Exercising, Support Group Information and Addressed Current Habits / Changes to make     Physical Activity/Exercise    Discussed Perceived Compliance  Reasonable Goals Set  Motivation  Comments: none    Exercise:  Do you currently have an exercise routine? yes - patient rides bike for 30 minutes daily and reports being active at work     Goals:   1. Work to increase to 3-4 small meals per day, with planned snacks as needed. Recommend following plate method for meal planning - focusing on lean protein, non-starchy vegetables, and measured amounts of starch.    - Goal of  g protein and  g carbohydrate per day. - Recommend continuing protein supplement as meal replacement at least 1x/day  2. Increase non caloric fluid to 64 oz per day. Eliminate caffeine, added sugar, carbonation, and straws.               -Continue to work to decrease sugar sweetened beverages - goal of calorie free beverages only              -Must eliminate caffeine prior to surgery and avoid for ~6-8 weeks  3. Start activity regimen, work to increase ADL              -Try to start walking for at least 60  minutes 5 days per week  4. Continue complete multivitamin     Candidate for surgery (per RD): PENDING   Sara Ledesma RD  06/18/20

## 2020-07-27 ENCOUNTER — CLINICAL SUPPORT (OUTPATIENT)
Dept: SURGERY | Age: 40
End: 2020-07-27

## 2020-07-27 ENCOUNTER — VIRTUAL VISIT (OUTPATIENT)
Dept: SURGERY | Age: 40
End: 2020-07-27

## 2020-07-27 VITALS — BODY MASS INDEX: 39.68 KG/M2 | WEIGHT: 293 LBS | HEIGHT: 72 IN

## 2020-07-27 DIAGNOSIS — E66.01 MORBID OBESITY (HCC): ICD-10-CM

## 2020-07-27 DIAGNOSIS — E66.01 MORBID OBESITY (HCC): Primary | ICD-10-CM

## 2020-07-27 DIAGNOSIS — R73.03 PREDIABETES: ICD-10-CM

## 2020-07-27 DIAGNOSIS — E66.01 MORBID OBESITY WITH BMI OF 40.0-44.9, ADULT (HCC): ICD-10-CM

## 2020-07-27 DIAGNOSIS — O24.419 GESTATIONAL DIABETES MELLITUS (GDM), ANTEPARTUM, GESTATIONAL DIABETES METHOD OF CONTROL UNSPECIFIED: Primary | ICD-10-CM

## 2020-07-27 DIAGNOSIS — D50.8 OTHER IRON DEFICIENCY ANEMIA: ICD-10-CM

## 2020-07-27 NOTE — PROGRESS NOTES
Medical Weight Loss Multi-Disciplinary Program    Name: Cuong Gallego   : 1980    Session# 2  Date: 2020    Visit Vitals  Ht 6' (1.829 m)   Wt 137.4 kg (303 lb)   BMI 41.09 kg/m²     Dietary Instructions    Reviewed intake  Understanding low carbohydrates, low sugar, higher protein meals  Instruction given for personal dietary changes  Discussed perceived compliance  Comments: Diet hx reviewed and personal dietary changes discussed. Reviewed recommendation to follow 5905-8559 calorie diet, working to reduce total carbohydrate intake to  g or less per day and increasing protein intake to  g per day, compared current intake to recommendations. Reinforced patient tracking daily carbohydrate intake, reviewed label readings and the importance of patient tracking portions. Today the majority of our visit was spent reviewing patient's food recall and identifying areas for improvement. Educated  on the importance of eating 3 meals/day at regularly scheduled times including breakfast within 1st 1-2 hours of waking. Suggested patient use a protein supplement as a meal replacement instead of skipping OR as a between meal high protein snack. Also educated on the importance of including a protein with every meal and snack and reviewed lean sources. Nutritional Hx: What is the number of meals you eat per day? 3  Comment:     Do you eat between meals / snack? yes  Typical snack: fruit, yogut    How fast do you eat your meals? rapid    How often do you eat fast food? 1 times a week    How many sodas/sugared beverages do you drink per day? 1- glass of soda - not daily     How many caffeinated drinks do you have per day? Soda- eliminated coffeee    How much milk and/or juice do you drink per day? Avoiding juice -     How much water do you drink per day? 8-10 (8oz glasses)    How often do you consume alcohol?  None    Current Vitamins: Womens health vitamin D    Diet History:    Typical intake is as follows:  Wakes up at 5:00 am   Breakfast: 5:30 am -1-2 egg with 2 slices of barnes and 1 slice of bread OR 1/2 english muffin -eliminated coffee - drinking water   Snack: Dannon strawberry/banana yogurt OR 5 oz greek yogurt -vanilla flavored OR  1 medium yogurt   Lunch: Meatloaf - breadcrumbs with ketchup with peas Z  Snack: Fruit cup OR blacberries/raspberries   Dinner: Steak with broccoli and potatoes - Typically has protein, 2 serving of veggies and starch at evening meal   Fluids: Water - 5 bottles of water, 1 glass of soda -not daily     Physical Activity/Exercise    Discussed Perceived Compliance  Motivation    Patient has started physical activity regimen, reinforced the importance of regular physical activity for total health and weight management. Patient       Behavior Modification    Identify obstacles to trigger change  Achieving/Rewarding goals met  Positive attitude  Comments: Reinforced importance continuing to modify lifestyle patterns and behaviors to promote weight loss and long term weight maintenance       Goals:   1. Work to increase to 3-4 small meals per day, with planned snacks as needed. Recommend following plate method for meal planning - focusing on lean protein, non-starchy vegetables, and measured amounts of starch. - Goal of  g protein and  g carbohydrate per day. - Recommend continuing protein supplement as meal replacement at least 1x/day OR as high protein snack option  2. Increase non caloric fluid to 64 oz per day. Eliminate caffeine, added sugar, carbonation, and straws.               -Continue to work to decrease sugar sweetened beverages - goal of calorie free beverages only              -Must eliminate caffeine prior to surgery and avoid for ~6-8 weeks   -Practice 30:30 rule,  food and flood   3. Start activity regimen, work to increase ADL  4. Start Complete MVI    Candidate for surgery (per RD):  PENDING    David Licea    Dietitian: Sissy Barrera, RD

## 2020-07-30 PROBLEM — O09.529 ADVANCED MATERNAL AGE IN MULTIGRAVIDA: Status: RESOLVED | Noted: 2019-05-13 | Resolved: 2020-07-30

## 2020-07-30 PROBLEM — Z33.1 IUP (INTRAUTERINE PREGNANCY), INCIDENTAL: Status: RESOLVED | Noted: 2019-05-13 | Resolved: 2020-07-30

## 2020-07-30 PROBLEM — E66.9 OBESITY: Status: RESOLVED | Noted: 2019-05-13 | Resolved: 2020-07-30

## 2020-07-31 NOTE — PROGRESS NOTES
Bariatric Surgery Consultation    Subjective: The patient is a 35 y.o. obese female with a BMI of 41.9. The patient is at her heaviest weight for the past 5 years. she has been overweight since age 26.   she has been considering surgery since lasy year. she desires surgery at this time because of multiple health concerns and their lifestyle issues which are hindered by their weight. she has been referred by his family physician  for evaluation and treatment of their obesity via surgical intervention. Carmela Dunbar has tried multiple diets in her lifetime most recently tried physician supervised, behavior modification, unsupervised diets and Atkins    Bariatric comorbidities present are   Patient Active Problem List   Diagnosis Code    Gestational diabetes O23.80    Obesity E66.9    IUP (intrauterine pregnancy), incidental Z33.3    Advanced maternal age in multigravida O12.46    Normal spontaneous vaginal delivery O80    Perineal laceration, first degree, delivered O70.0    Anemia during pregnancy O99.019    Maternal fever during labor O75.2    Morbid obesity ([address] E66.01    Morbid obesity with BMI of 40.0-44.9, adult ([address] E66.01, Z68.41    Prediabetes R73.03    Anemia D64.9       The patient is considering laparoscopic sleeve gastrectomy for surgical weight loss due to their ineffective progress with medical forms of weight loss and the urging of their physician who cares for their primary medical issues. The patient  now presents  for consideration for weight loss surgery understanding the benefits of this over a medical approach of weight loss as was discussed in our presentation on weight loss surgery. They have discussed their plans both with their family and primary care physician who is in support of their pursuit of such. The patient has not had health issues as of late and denies and gastrointestinal disturbances other than what is outlined below in their review of symptoms.  All of their prior evaluations available by both their PCP's and specialists physicians have been reviewed today either in the Care Everywhere portal or scanned under the media tab. I have spent a large portion of my initial consultation today reviewing the patients current dietary habits which have contributed to their health issues and obesity. I have suggested to them personally a dietary regimen that they can initiate now to help with their status as it pertains to their weight. They understand that the most important aspect of their journey through their weight loss endeavor will be their adherence to a new lifestyle of healthy eating behavior. They also understand that an adherence to an exercise program will not only help with weight loss but is ultimately important in weight maintenance. The patients goal weight is 210lb. These goals are consistent with expected outcomes of their desired operation. her Medical goals are resolution of these health issues. Patient Active Problem List    Diagnosis Date Noted    Morbid obesity ([address]     Morbid obesity with BMI of 40.0-44.9, adult ([address]     Prediabetes     Anemia     Normal spontaneous vaginal delivery 05/15/2019    Perineal laceration, first degree, delivered 05/15/2019    Anemia during pregnancy 05/15/2019    Maternal fever during labor 05/15/2019    Gestational diabetes 2019    Obesity 2019    IUP (intrauterine pregnancy), incidental 2019    Advanced maternal age in multigravida 2019     No past surgical history on file. Social History     Tobacco Use    Smoking status: Former Smoker     Last attempt to quit: 2018     Years since quittin.9    Smokeless tobacco: Never Used   Substance Use Topics    Alcohol use: Not Currently      No family history on file.    Current Outpatient Medications   Medication Sig Dispense Refill    ferrous sulfate 325 mg (65 mg iron) tablet Take  by mouth Daily (before breakfast).  labetalol (NORMODYNE) 200 mg tablet Take 1 Tab by mouth daily. Indications: high blood pressure 30 Tab 0    ibuprofen (MOTRIN) 800 mg tablet Take 1 Tab by mouth every eight (8) hours as needed for Pain. 60 Tab 1    PNV No12-Iron-FA-DSS-OM-3 29 mg iron-1 mg -50 mg CPKD Take 1 Tab by mouth.        No Known Allergies       Review of Systems:       General - No history or complaints of unexpected fever, chills, or weight loss  Head/Neck - No history or complaints of headache, diplopia, dysphagia, hearing loss  Cardiac - No history or complaints of chest pain, palpitations, murmur, or shortness of breath  Pulmonary - No history or complaints of shortness of breath, productive cough, hemoptysis  Gastrointestinal - mild reflux,no  abdominal pain, obstipation/constipation or blood per rectum  Genitourinary - No history or complaints of hematuria/dysuria, stress urinary incontinence symptoms, or renal lithiasis  Musculoskeletal - mild joint pain,  no muscular weakness  Hematologic - No history or complaints of bleeding disorders,  No blood transfusions  Neurologic - No history or complaints of  migraine headaches, seizure activity, syncopal episodes, TIA or stroke  Integumentary - No history or complaints of rashes, abnormal nevi, skin cancer  Gynecological - heavy menses               Objective:   Ht- 72in    Wt- 303lbs   BMI  -  41    Physical Examination:       Physical Examination: General appearance - alert, well appearing, and in no distress and oriented to person, place, and time  Mental status - alert, oriented to person, place, and time, normal mood, behavior, speech, dress, motor activity, and thought processes  Eyes - pupils equal and reactive, extraocular eye movements intact, sclera anicteric, left eye normal, right eye normal  Ears - right ear normal, left ear normal  Neck- good extension and flexion, no obvious swelling  Chest - good air movement  Heart - N/A  Abdomen - no obvious distension. Neurological - alert, oriented, normal speech, no focal findings or movement disorder noted  Musculoskeletal - no swelling noted  Extremities - normal movement    Labs:       No results found for this or any previous visit (from the past 1440 hour(s)). Assessment:     Morbid obesity with comorbidity    Plan:     laparoscopic sleeve gastrectomy    This is a 35 y.o. female with a BMI of 41 and the weight-related co-morbidties as noted below. Rachell Russo meets the NIH criteria for bariatric surgery based upon the BMI of There is no height or weight on file to calculate BMI. and multiple weight-related co-morbidties. Rachell Russo has elected laparoscopic sleeve gastrectomy as her intervention of choice for treatment of morbid obestiy through surgical means secondary to its safety profile, rapid return to work  and decreases in operative risks over gastric bypass. In the office today, following Deyanira's history and physical examination, a 30 minute discussion regarding the anatomic alterations for the laparoscopic sleeve gastrectomy was undertaken. The dietary expectations and the patient and physician dependent factors for success were thoroughly discussed, to include the need for interval follow-up and long-term dietary changes associated with success. The possible complications of the sleeve gastrectomy  were also discussed, to include;death, DVT/PE, staple line leak, bleeding, stricture formation, infection, nutritional deficiencies and sleeve dilation. Specific weight related outcomes for success were also discussed with an emphasis on careful and close follow-up with the first year and eating behavior modification as the baseline and cyclical hunger return. The patient expressed an understanding of the above factors, and her questions were answered in their entirety.     In addition, the patient attended a 1.5 hour power point seminar regarding obesity, surgical weight loss including, adjustable gastric band, gastric bypass, and sleeve gastrectomy. This discussion contrasted the different surgical techniques, mechanisms of actions and expected outcomes, and surgical and medical risks associated with each procedure. During this seminar, there was a long question and answer session where each questions was answered until there were no additional questions. Today, the patient had all of her questions answered and desires to proceed with  bariatric surgery initially choosing sleeve gastrectomy as her surgical option. Secondary Diagnoses:       Dietary Intervention  - The patient is currently scheduled to see or has been followed by a bariatric nutritionist for an attempt at preoperative weight loss as has been dictated by their insurance carrier. They will be assessed at various times during their follow up to evaluate their progress depending on the length of time that is required once again by their carrier. I have explained the importance of   preoperative weight loss and the benefits regarding lower surgical risk and also assisting the patient in reaching their weight loss goal. They understand also that they should participate in an  exercise program to assist in this weight loss. Finally they understand there is a physiologic benefit from the standpoint of hepatic volume reduction and reduction of central visceral adiposity   preoperatively. I have reiterated the importance of a low carbohydrate and high protein regimen to achieve their   stated goal. I have reviewed their current eating behavior prior to this encounter and explained to them in an exhaustive fashion the appropriate diet that they should adhere to. They have been   encouraged to loose weight pre operatively and understand it is our prerogative to cancel surgery or postpone their procedure in the event of significant weight gain. The patient has completed 3 nutrition visits and has 3 more to go.         Signed By: Shayyfariba Benoit MD     July 30, 2020       This visit with Jordi Lopez was performed under virtual telemedicine guidelines during the coronavirus (BKBWU-29) public health emergency on 7/27/20202 in an interactive   fashion using [employer] me. They understand that this telemedicine encounter is a billable service, with coverage determined by their insurance carrier. They are aware that   they may receive a bill and have provided verbal consent for this virtual visit. This visit was performed with the patient in their home environment and provider was   present at [facility]. I have spent over 40 minutes on this visit  both prior to the visits reviewing the patients chart and with the patient face to face. I have reviewed their medical history, performed a telemedicine physical examination, and discussed the plan of action to date. They understand that they will be asked   to come to the office when our office is allowed normal patient interaction, as dictated by public health officials, for a face-to-face visit to rediscuss all of the things we  have talked about today. During this visit we discussed the varieties of surgeries that we perform, how they would impact the patient from a weight loss standpoint   considering their medical issues and prior surgeries, and also the restrictions that the patient would have long-term with the operation that they have chosen.     Sandhya Amaya M.D.  7/30/2020

## 2020-08-21 ENCOUNTER — CLINICAL SUPPORT (OUTPATIENT)
Dept: SURGERY | Age: 40
End: 2020-08-21

## 2020-08-21 VITALS — BODY MASS INDEX: 39.68 KG/M2 | WEIGHT: 293 LBS | HEIGHT: 72 IN

## 2020-08-21 DIAGNOSIS — E66.01 MORBID OBESITY WITH BMI OF 40.0-44.9, ADULT (HCC): Primary | ICD-10-CM

## 2020-08-21 NOTE — PROGRESS NOTES
Medical Weight Loss Multi-Disciplinary Program    Name: Barry Hernandez   : 1980    Session# 3  Date: 2020    Visit Vitals  Ht 6' (1.829 m)   Wt 137.4 kg (303 lb)   BMI 41.09 kg/m²     Pt states home scale shows 300 lbs. Pt had resumed smoking on 2020 months - but reports she stopped 1 month ago. Dietary Instructions    Reviewed intake  Understanding low carbohydrates, low sugar, higher protein meals  Instruction given for personal dietary changes  Discussed perceived compliance  Comments: Diet hx reviewed and personal dietary changes discussed. Reviewed recommendation to follow 1620-5400 calorie diet, working to reduce total carbohydrate intake to  g or less per day and increasing protein intake to  g per day, compared current intake to recommendations. Reinforced patient tracking daily carbohydrate intake, reviewed label readings and the importance of patient tracking portions. Today the majority of our visit was spent reviewing patient's food recall and identifying areas for improvement. Educated  on the importance of eating 3 meals/day at regularly scheduled times including breakfast within 1st 1-2 hours of waking. Suggested patient use a protein supplement as a meal replacement instead of skipping OR as a between meal high protein snack. Also educated on the importance of including a protein with every meal and snack and reviewed lean sources. Pt states she hasn't made any additional changes this month. Continuing to walk, lower carbohydrates, and portion control    Nutritional Hx: What is the number of meals you eat per day? 3  Comment:     Do you eat between meals / snack? yes  Typical snack: fruit, yogut, chips    How fast do you eat your meals? rapid    How often do you eat fast food, restaurant food, or take-out? 3x/month    How many sodas/sugared beverages do you drink per day?  2L/day - reduced to 1 16 oz. day, trying     How many caffeinated drinks do you have per day? Soda- eliminated coffeee    How much milk and/or juice do you drink per day? Avoiding juice -     How much water do you drink per day? 6 water bottles (16 ounces each)    How often do you consume alcohol? None    Current Vitamins: Womens health vitamin D    Diet History:    Typical intake is as follows:  Wakes up at 5:00 am   Breakfast: 5:30 am -1-2 egg with 2 slices of barnes and sometimes 1 slice of bread OR 1/2 english muffin -eliminated coffee - drinking water   Snack: Dannon strawberry/banana yogurt OR 5 oz greek yogurt -vanilla flavored OR  1 medium yogurt   Lunch: Meatloaf - breadcrumbs with ketchup with peas - Steamed shrimp & broccoli @ chinese restaurant    Snack: Fruit cup OR blacberries/raspberries   Dinner: Steak with broccoli and potatoes - Typically has protein, 2 serving of veggies and starch at evening meal   Fluids: Water - 5 bottles of water, 1 glass of soda -not daily     Physical Activity/Exercise    Discussed Perceived Compliance  Motivation    Patient has started physical activity regimen, reinforced the importance of regular physical activity for total health and weight management. Patient walking 35 min - 60 min, 3x/week. Walking frequently at work. Behavior Modification    Identify obstacles to trigger change  Achieving/Rewarding goals met  Positive attitude  Comments: Reinforced importance continuing to modify lifestyle patterns and behaviors to promote weight loss and long term weight maintenance       Goals:   1. Work to increase to 3-4 small meals per day, with planned snacks as needed. Recommend following plate method for meal planning - focusing on lean protein, non-starchy vegetables, and measured amounts of starch. - Goal of  g protein and  g carbohydrate per day. - Recommend continuing protein supplement as meal replacement at least 1x/day OR as high protein snack option  2. Increase non caloric fluid to 64 oz per day.   Eliminate caffeine, added sugar, carbonation, and straws.               -Continue to work to decrease sugar sweetened beverages - goal of calorie free beverages only              -Must eliminate caffeine prior to surgery and avoid for ~6-8 weeks   -Practice 30:30 rule,  food and flood   3. Start activity regimen, work to increase ADL  4. Start Complete MVI    Candidate for surgery (per RD):  PENDING      Dietitian: Orin Vázquez RD

## 2021-04-07 ENCOUNTER — HOSPITAL ENCOUNTER (OUTPATIENT)
Dept: INFUSION THERAPY | Age: 41
Discharge: HOME | End: 2021-04-07
Payer: MEDICAID

## 2021-04-07 LAB
ABO + RH BLD: NORMAL
BLOOD GROUP ANTIBODIES SERPL: NORMAL
SPECIMEN EXP DATE BLD: NORMAL

## 2021-04-07 PROCEDURE — 36415 COLL VENOUS BLD VENIPUNCTURE: CPT

## 2021-04-07 PROCEDURE — 86901 BLOOD TYPING SEROLOGIC RH(D): CPT

## 2021-04-08 ENCOUNTER — HOSPITAL ENCOUNTER (OUTPATIENT)
Dept: INFUSION THERAPY | Age: 41
Discharge: HOME | End: 2021-04-08
Payer: MEDICAID

## 2021-04-08 VITALS
RESPIRATION RATE: 18 BRPM | TEMPERATURE: 96.6 F | HEART RATE: 108 BPM | DIASTOLIC BLOOD PRESSURE: 86 MMHG | SYSTOLIC BLOOD PRESSURE: 124 MMHG | OXYGEN SATURATION: 100 %

## 2021-04-08 PROCEDURE — 74011250636 HC RX REV CODE- 250/636: Performed by: OBSTETRICS & GYNECOLOGY

## 2021-04-08 PROCEDURE — 96372 THER/PROPH/DIAG INJ SC/IM: CPT

## 2021-04-08 RX ADMIN — HUMAN RHO(D) IMMUNE GLOBULIN 0.3 MG: 300 INJECTION, SOLUTION INTRAMUSCULAR at 11:36

## 2021-04-09 LAB
BLD PROD TYP BPU: NORMAL
BPU ID: NORMAL
GA (WEEKS): 28 WK
STATUS OF UNIT,%ST: NORMAL
UNIT DIVISION, %UDIV: 0

## 2021-04-14 ENCOUNTER — HOSPITAL ENCOUNTER (OUTPATIENT)
Age: 41
Discharge: HOME | End: 2021-04-14
Attending: OBSTETRICS & GYNECOLOGY | Admitting: OBSTETRICS & GYNECOLOGY
Payer: MEDICAID

## 2021-04-14 VITALS
SYSTOLIC BLOOD PRESSURE: 114 MMHG | RESPIRATION RATE: 19 BRPM | BODY MASS INDEX: 39.68 KG/M2 | HEIGHT: 72 IN | HEART RATE: 99 BPM | WEIGHT: 293 LBS | DIASTOLIC BLOOD PRESSURE: 67 MMHG | TEMPERATURE: 98.7 F

## 2021-04-14 PROBLEM — O36.8190 DECREASED FETAL MOVEMENT: Status: ACTIVE | Noted: 2021-04-14

## 2021-04-14 PROCEDURE — 59025 FETAL NON-STRESS TEST: CPT

## 2021-04-14 NOTE — PROGRESS NOTES
1123  @ 29.1 weeks arrived from office for NST d/t  Decreased FM. Ambulated to bed and connected to EFM. [place] Dr. Elizabeth Estevez called, reviewed NST, discharge orders received. 1208 Discharge instructions given, pt verbalized understanding.

## 2021-06-07 ENCOUNTER — APPOINTMENT (OUTPATIENT)
Dept: GENERAL RADIOLOGY | Age: 41
DRG: 540 | End: 2021-06-07
Attending: OBSTETRICS & GYNECOLOGY
Payer: MEDICAID

## 2021-06-07 ENCOUNTER — ANESTHESIA EVENT (OUTPATIENT)
Dept: LABOR AND DELIVERY | Age: 41
DRG: 540 | End: 2021-06-07
Payer: MEDICAID

## 2021-06-07 ENCOUNTER — ANESTHESIA (OUTPATIENT)
Dept: LABOR AND DELIVERY | Age: 41
DRG: 540 | End: 2021-06-07
Payer: MEDICAID

## 2021-06-07 ENCOUNTER — HOSPITAL ENCOUNTER (INPATIENT)
Age: 41
LOS: 4 days | Discharge: HOME | DRG: 540 | End: 2021-06-11
Attending: OBSTETRICS & GYNECOLOGY | Admitting: OBSTETRICS & GYNECOLOGY
Payer: MEDICAID

## 2021-06-07 DIAGNOSIS — R60.0 BILATERAL LEG EDEMA: ICD-10-CM

## 2021-06-07 PROBLEM — O36.8131 DECREASED FETAL MOVEMENT AFFECTING MANAGEMENT OF PREGNANCY IN THIRD TRIMESTER, FETUS 1: Status: ACTIVE | Noted: 2021-06-07

## 2021-06-07 PROBLEM — M25.471 SWELLING OF BOTH ANKLES: Status: ACTIVE | Noted: 2021-06-07

## 2021-06-07 PROBLEM — O99.891 SHORTNESS OF BREATH DURING PREGNANCY: Status: ACTIVE | Noted: 2021-06-07

## 2021-06-07 PROBLEM — M54.9 BACK PAIN: Status: ACTIVE | Noted: 2021-06-07

## 2021-06-07 PROBLEM — O13.3 PREGNANCY INDUCED HYPERTENSION, THIRD TRIMESTER: Status: ACTIVE | Noted: 2021-06-07

## 2021-06-07 PROBLEM — M25.472 SWELLING OF BOTH ANKLES: Status: ACTIVE | Noted: 2021-06-07

## 2021-06-07 PROBLEM — R06.01 ORTHOPNEA: Status: ACTIVE | Noted: 2021-06-07

## 2021-06-07 PROBLEM — Z87.59 HISTORY OF PREGNANCY INDUCED HYPERTENSION: Status: ACTIVE | Noted: 2021-06-07

## 2021-06-07 PROBLEM — R06.02 SHORTNESS OF BREATH DURING PREGNANCY: Status: ACTIVE | Noted: 2021-06-07

## 2021-06-07 LAB
ALBUMIN SERPL-MCNC: 2.4 G/DL (ref 3.4–5)
ALBUMIN/GLOB SERPL: 0.6 {RATIO} (ref 0.8–1.7)
ALP SERPL-CCNC: 91 U/L (ref 45–117)
ALT SERPL-CCNC: 15 U/L (ref 13–56)
ANION GAP SERPL CALC-SCNC: 6 MMOL/L (ref 3–18)
APPEARANCE UR: CLEAR
APPEARANCE UR: CLEAR
AST SERPL-CCNC: 15 U/L (ref 10–38)
ATRIAL RATE: 67 BPM
BASOPHILS # BLD: 0 K/UL (ref 0–0.1)
BASOPHILS NFR BLD: 0 % (ref 0–2)
BILIRUB SERPL-MCNC: 0.3 MG/DL (ref 0.2–1)
BILIRUB UR QL: NEGATIVE
BILIRUB UR QL: NEGATIVE
BUN SERPL-MCNC: 9 MG/DL (ref 7–18)
BUN/CREAT SERPL: 16 (ref 12–20)
CALCIUM SERPL-MCNC: 8.9 MG/DL (ref 8.5–10.1)
CALCULATED P AXIS, ECG09: 28 DEGREES
CALCULATED R AXIS, ECG10: 23 DEGREES
CALCULATED T AXIS, ECG11: 26 DEGREES
CHLORIDE SERPL-SCNC: 107 MMOL/L (ref 100–111)
CK SERPL-CCNC: 124 U/L (ref 26–192)
CO2 SERPL-SCNC: 24 MMOL/L (ref 21–32)
COLOR UR: NORMAL
COLOR UR: YELLOW
CREAT SERPL-MCNC: 0.55 MG/DL (ref 0.6–1.3)
CREAT UR-MCNC: 31 MG/DL (ref 30–125)
DIAGNOSIS, 93000: NORMAL
DIFFERENTIAL METHOD BLD: ABNORMAL
EOSINOPHIL # BLD: 0.1 K/UL (ref 0–0.4)
EOSINOPHIL NFR BLD: 1 % (ref 0–5)
ERYTHROCYTE [DISTWIDTH] IN BLOOD BY AUTOMATED COUNT: 14.1 % (ref 11.6–14.5)
FIBRINOGEN PPP-MCNC: 533 MG/DL (ref 210–451)
GLOBULIN SER CALC-MCNC: 3.9 G/DL (ref 2–4)
GLUCOSE BLD STRIP.AUTO-MCNC: 83 MG/DL (ref 70–110)
GLUCOSE SERPL-MCNC: 120 MG/DL (ref 74–99)
GLUCOSE UR QL STRIP.AUTO: NEGATIVE MG/DL
GLUCOSE UR STRIP.AUTO-MCNC: NEGATIVE MG/DL
HCT VFR BLD AUTO: 28.3 % (ref 35–45)
HGB BLD-MCNC: 9.1 G/DL (ref 12–16)
HGB UR QL STRIP: NEGATIVE
KETONES UR QL STRIP.AUTO: NEGATIVE MG/DL
KETONES UR-MCNC: NEGATIVE MG/DL
LEUKOCYTE ESTERASE UR QL STRIP.AUTO: NEGATIVE
LEUKOCYTE ESTERASE UR QL STRIP: NEGATIVE
LYMPHOCYTES # BLD: 1.3 K/UL (ref 0.9–3.6)
LYMPHOCYTES NFR BLD: 16 % (ref 21–52)
MCH RBC QN AUTO: 26.4 PG (ref 24–34)
MCHC RBC AUTO-ENTMCNC: 32.2 G/DL (ref 31–37)
MCV RBC AUTO: 82 FL (ref 74–97)
MONOCYTES # BLD: 0.5 K/UL (ref 0.05–1.2)
MONOCYTES NFR BLD: 6 % (ref 3–10)
NEUTS SEG # BLD: 6.2 K/UL (ref 1.8–8)
NEUTS SEG NFR BLD: 76 % (ref 40–73)
NITRITE UR QL STRIP.AUTO: NEGATIVE
NITRITE UR QL: NEGATIVE
P-R INTERVAL, ECG05: 172 MS
PH UR STRIP: 7 [PH] (ref 5–8)
PH UR: 7 [PH] (ref 5–9)
PLATELET # BLD AUTO: 213 K/UL (ref 135–420)
PMV BLD AUTO: 11.8 FL (ref 9.2–11.8)
POTASSIUM SERPL-SCNC: 4 MMOL/L (ref 3.5–5.5)
PROT SERPL-MCNC: 6.3 G/DL (ref 6.4–8.2)
PROT UR QL: NEGATIVE MG/DL
PROT UR STRIP-MCNC: NEGATIVE MG/DL
PROT UR-MCNC: 9 MG/DL
PROT/CREAT UR-RTO: 0.3
Q-T INTERVAL, ECG07: 384 MS
QRS DURATION, ECG06: 72 MS
QTC CALCULATION (BEZET), ECG08: 405 MS
RBC # BLD AUTO: 3.45 M/UL (ref 4.2–5.3)
RBC # UR STRIP: NEGATIVE /UL
RPR SER QL: NONREACTIVE
SERVICE CMNT-IMP: NORMAL
SODIUM SERPL-SCNC: 137 MMOL/L (ref 136–145)
SP GR UR REFRACTOMETRY: 1.01 (ref 1–1.03)
SP GR UR: 1.01 (ref 1–1.02)
URATE SERPL-MCNC: 4.6 MG/DL (ref 2.6–7.2)
UROBILINOGEN UR QL STRIP.AUTO: 1 EU/DL (ref 0.2–1)
UROBILINOGEN UR QL: 0.2 EU/DL (ref 0.2–1)
VENTRICULAR RATE, ECG03: 67 BPM
WBC # BLD AUTO: 8.2 K/UL (ref 4.6–13.2)

## 2021-06-07 PROCEDURE — 81003 URINALYSIS AUTO W/O SCOPE: CPT

## 2021-06-07 PROCEDURE — 75410000002 HC LABOR FEE PER 1 HR

## 2021-06-07 PROCEDURE — 85384 FIBRINOGEN ACTIVITY: CPT

## 2021-06-07 PROCEDURE — 85025 COMPLETE CBC W/AUTO DIFF WBC: CPT

## 2021-06-07 PROCEDURE — 74011250636 HC RX REV CODE- 250/636: Performed by: ANESTHESIOLOGY

## 2021-06-07 PROCEDURE — 84156 ASSAY OF PROTEIN URINE: CPT

## 2021-06-07 PROCEDURE — 74011250636 HC RX REV CODE- 250/636: Performed by: OBSTETRICS & GYNECOLOGY

## 2021-06-07 PROCEDURE — 93005 ELECTROCARDIOGRAM TRACING: CPT

## 2021-06-07 PROCEDURE — 82962 GLUCOSE BLOOD TEST: CPT

## 2021-06-07 PROCEDURE — 36415 COLL VENOUS BLD VENIPUNCTURE: CPT

## 2021-06-07 PROCEDURE — 84550 ASSAY OF BLOOD/URIC ACID: CPT

## 2021-06-07 PROCEDURE — 74011000258 HC RX REV CODE- 258: Performed by: ANESTHESIOLOGY

## 2021-06-07 PROCEDURE — 00HU33Z INSERTION OF INFUSION DEVICE INTO SPINAL CANAL, PERCUTANEOUS APPROACH: ICD-10-PCS | Performed by: ANESTHESIOLOGY

## 2021-06-07 PROCEDURE — 82550 ASSAY OF CK (CPK): CPT

## 2021-06-07 PROCEDURE — 71045 X-RAY EXAM CHEST 1 VIEW: CPT

## 2021-06-07 PROCEDURE — 65270000029 HC RM PRIVATE

## 2021-06-07 PROCEDURE — 74011000250 HC RX REV CODE- 250: Performed by: ANESTHESIOLOGY

## 2021-06-07 PROCEDURE — 77030007879 HC KT SPN EPDRL TELE -B: Performed by: ANESTHESIOLOGY

## 2021-06-07 PROCEDURE — 74011250637 HC RX REV CODE- 250/637

## 2021-06-07 PROCEDURE — 74011000258 HC RX REV CODE- 258: Performed by: OBSTETRICS & GYNECOLOGY

## 2021-06-07 PROCEDURE — 80053 COMPREHEN METABOLIC PANEL: CPT

## 2021-06-07 PROCEDURE — 86592 SYPHILIS TEST NON-TREP QUAL: CPT

## 2021-06-07 PROCEDURE — 74011250637 HC RX REV CODE- 250/637: Performed by: OBSTETRICS & GYNECOLOGY

## 2021-06-07 RX ORDER — FUROSEMIDE 10 MG/ML
INJECTION INTRAMUSCULAR; INTRAVENOUS
Status: DISPENSED
Start: 2021-06-07 | End: 2021-06-07

## 2021-06-07 RX ORDER — DIPHENHYDRAMINE HYDROCHLORIDE 50 MG/ML
12.5 INJECTION, SOLUTION INTRAMUSCULAR; INTRAVENOUS
Status: DISCONTINUED | OUTPATIENT
Start: 2021-06-07 | End: 2021-06-11 | Stop reason: HOSPADM

## 2021-06-07 RX ORDER — LIDOCAINE HYDROCHLORIDE AND EPINEPHRINE 15; 5 MG/ML; UG/ML
INJECTION, SOLUTION EPIDURAL AS NEEDED
Status: DISCONTINUED | OUTPATIENT
Start: 2021-06-07 | End: 2021-06-09 | Stop reason: HOSPADM

## 2021-06-07 RX ORDER — HYDROMORPHONE HYDROCHLORIDE 1 MG/ML
1 INJECTION, SOLUTION INTRAMUSCULAR; INTRAVENOUS; SUBCUTANEOUS
Status: DISCONTINUED | OUTPATIENT
Start: 2021-06-07 | End: 2021-06-09 | Stop reason: HOSPADM

## 2021-06-07 RX ORDER — OXYTOCIN/RINGER'S LACTATE 30/500 ML
10 PLASTIC BAG, INJECTION (ML) INTRAVENOUS AS NEEDED
Status: DISCONTINUED | OUTPATIENT
Start: 2021-06-07 | End: 2021-06-09 | Stop reason: HOSPADM

## 2021-06-07 RX ORDER — LABETALOL HYDROCHLORIDE 5 MG/ML
80 INJECTION, SOLUTION INTRAVENOUS
Status: ACTIVE | OUTPATIENT
Start: 2021-06-07 | End: 2021-06-08

## 2021-06-07 RX ORDER — MINERAL OIL
30 OIL (ML) ORAL AS NEEDED
Status: DISCONTINUED | OUTPATIENT
Start: 2021-06-07 | End: 2021-06-09 | Stop reason: HOSPADM

## 2021-06-07 RX ORDER — DEXTROSE, SODIUM CHLORIDE, SODIUM LACTATE, POTASSIUM CHLORIDE, AND CALCIUM CHLORIDE 5; .6; .31; .03; .02 G/100ML; G/100ML; G/100ML; G/100ML; G/100ML
25 INJECTION, SOLUTION INTRAVENOUS CONTINUOUS
Status: DISCONTINUED | OUTPATIENT
Start: 2021-06-07 | End: 2021-06-08

## 2021-06-07 RX ORDER — HYDRALAZINE HYDROCHLORIDE 20 MG/ML
10 INJECTION INTRAMUSCULAR; INTRAVENOUS
Status: ACTIVE | OUTPATIENT
Start: 2021-06-07 | End: 2021-06-08

## 2021-06-07 RX ORDER — TERBUTALINE SULFATE 1 MG/ML
0.25 INJECTION SUBCUTANEOUS
Status: DISCONTINUED | OUTPATIENT
Start: 2021-06-07 | End: 2021-06-09 | Stop reason: HOSPADM

## 2021-06-07 RX ORDER — LIDOCAINE HYDROCHLORIDE AND EPINEPHRINE 15; 5 MG/ML; UG/ML
INJECTION, SOLUTION EPIDURAL
Status: SHIPPED | OUTPATIENT
Start: 2021-06-07 | End: 2021-06-07

## 2021-06-07 RX ORDER — LIDOCAINE HYDROCHLORIDE 10 MG/ML
INJECTION INFILTRATION; PERINEURAL AS NEEDED
Status: DISCONTINUED | OUTPATIENT
Start: 2021-06-07 | End: 2021-06-09 | Stop reason: HOSPADM

## 2021-06-07 RX ORDER — NIFEDIPINE 10 MG/1
CAPSULE ORAL
Status: COMPLETED
Start: 2021-06-07 | End: 2021-06-07

## 2021-06-07 RX ORDER — NALOXONE HYDROCHLORIDE 0.4 MG/ML
0.2 INJECTION, SOLUTION INTRAMUSCULAR; INTRAVENOUS; SUBCUTANEOUS AS NEEDED
Status: DISCONTINUED | OUTPATIENT
Start: 2021-06-07 | End: 2021-06-09 | Stop reason: HOSPADM

## 2021-06-07 RX ORDER — NIFEDIPINE 30 MG/1
30 TABLET, EXTENDED RELEASE ORAL DAILY
Status: DISCONTINUED | OUTPATIENT
Start: 2021-06-07 | End: 2021-06-07

## 2021-06-07 RX ORDER — SODIUM CHLORIDE 0.9 % (FLUSH) 0.9 %
5-40 SYRINGE (ML) INJECTION EVERY 8 HOURS
Status: DISCONTINUED | OUTPATIENT
Start: 2021-06-07 | End: 2021-06-10 | Stop reason: SDUPTHER

## 2021-06-07 RX ORDER — BUTORPHANOL TARTRATE 2 MG/ML
2 INJECTION INTRAMUSCULAR; INTRAVENOUS
Status: DISCONTINUED | OUTPATIENT
Start: 2021-06-07 | End: 2021-06-09 | Stop reason: HOSPADM

## 2021-06-07 RX ORDER — SODIUM CHLORIDE 0.9 % (FLUSH) 0.9 %
5-40 SYRINGE (ML) INJECTION AS NEEDED
Status: DISCONTINUED | OUTPATIENT
Start: 2021-06-07 | End: 2021-06-10 | Stop reason: SDUPTHER

## 2021-06-07 RX ORDER — SODIUM CHLORIDE 0.9 % (FLUSH) 0.9 %
5-40 SYRINGE (ML) INJECTION EVERY 8 HOURS
Status: DISCONTINUED | OUTPATIENT
Start: 2021-06-07 | End: 2021-06-09 | Stop reason: HOSPADM

## 2021-06-07 RX ORDER — NIFEDIPINE 10 MG/1
20 CAPSULE ORAL 3 TIMES DAILY
Status: DISCONTINUED | OUTPATIENT
Start: 2021-06-07 | End: 2021-06-08 | Stop reason: DRUGHIGH

## 2021-06-07 RX ORDER — METHYLERGONOVINE MALEATE 0.2 MG/ML
0.2 INJECTION INTRAVENOUS AS NEEDED
Status: DISCONTINUED | OUTPATIENT
Start: 2021-06-07 | End: 2021-06-09 | Stop reason: HOSPADM

## 2021-06-07 RX ORDER — OXYTOCIN/0.9 % SODIUM CHLORIDE 30/500 ML
0-20 PLASTIC BAG, INJECTION (ML) INTRAVENOUS
Status: DISCONTINUED | OUTPATIENT
Start: 2021-06-07 | End: 2021-06-08

## 2021-06-07 RX ORDER — SODIUM CHLORIDE 0.9 % (FLUSH) 0.9 %
5-40 SYRINGE (ML) INJECTION AS NEEDED
Status: DISCONTINUED | OUTPATIENT
Start: 2021-06-07 | End: 2021-06-09 | Stop reason: HOSPADM

## 2021-06-07 RX ORDER — PHENYLEPHRINE HCL IN 0.9% NACL 1 MG/10 ML
80 SYRINGE (ML) INTRAVENOUS AS NEEDED
Status: DISCONTINUED | OUTPATIENT
Start: 2021-06-07 | End: 2021-06-09 | Stop reason: HOSPADM

## 2021-06-07 RX ORDER — NALBUPHINE HYDROCHLORIDE 10 MG/ML
10 INJECTION, SOLUTION INTRAMUSCULAR; INTRAVENOUS; SUBCUTANEOUS
Status: DISCONTINUED | OUTPATIENT
Start: 2021-06-07 | End: 2021-06-09 | Stop reason: HOSPADM

## 2021-06-07 RX ORDER — LABETALOL HYDROCHLORIDE 5 MG/ML
40 INJECTION, SOLUTION INTRAVENOUS
Status: ACTIVE | OUTPATIENT
Start: 2021-06-07 | End: 2021-06-08

## 2021-06-07 RX ORDER — FENTANYL CITRATE 50 UG/ML
INJECTION, SOLUTION INTRAMUSCULAR; INTRAVENOUS AS NEEDED
Status: DISCONTINUED | OUTPATIENT
Start: 2021-06-07 | End: 2021-06-09 | Stop reason: HOSPADM

## 2021-06-07 RX ORDER — NIFEDIPINE 30 MG/1
20 TABLET, EXTENDED RELEASE ORAL DAILY
Status: DISCONTINUED | OUTPATIENT
Start: 2021-06-07 | End: 2021-06-07

## 2021-06-07 RX ORDER — OXYTOCIN/0.9 % SODIUM CHLORIDE 30/500 ML
87.3 PLASTIC BAG, INJECTION (ML) INTRAVENOUS AS NEEDED
Status: DISCONTINUED | OUTPATIENT
Start: 2021-06-07 | End: 2021-06-09 | Stop reason: HOSPADM

## 2021-06-07 RX ORDER — SODIUM CHLORIDE, SODIUM LACTATE, POTASSIUM CHLORIDE, CALCIUM CHLORIDE 600; 310; 30; 20 MG/100ML; MG/100ML; MG/100ML; MG/100ML
25 INJECTION, SOLUTION INTRAVENOUS CONTINUOUS
Status: DISCONTINUED | OUTPATIENT
Start: 2021-06-07 | End: 2021-06-11 | Stop reason: HOSPADM

## 2021-06-07 RX ORDER — FUROSEMIDE 10 MG/ML
10 INJECTION INTRAMUSCULAR; INTRAVENOUS ONCE
Status: COMPLETED | OUTPATIENT
Start: 2021-06-07 | End: 2021-06-07

## 2021-06-07 RX ORDER — FENTANYL/ROPIVACAINE/NS/PF 2MCG/ML-.1
1-15 PLASTIC BAG, INJECTION (ML) EPIDURAL CONTINUOUS
Status: DISCONTINUED | OUTPATIENT
Start: 2021-06-07 | End: 2021-06-09 | Stop reason: HOSPADM

## 2021-06-07 RX ORDER — FENTANYL CITRATE 50 UG/ML
100 INJECTION, SOLUTION INTRAMUSCULAR; INTRAVENOUS ONCE
Status: COMPLETED | OUTPATIENT
Start: 2021-06-07 | End: 2021-06-07

## 2021-06-07 RX ORDER — SODIUM CHLORIDE, SODIUM LACTATE, POTASSIUM CHLORIDE, CALCIUM CHLORIDE 600; 310; 30; 20 MG/100ML; MG/100ML; MG/100ML; MG/100ML
125 INJECTION, SOLUTION INTRAVENOUS CONTINUOUS
Status: DISCONTINUED | OUTPATIENT
Start: 2021-06-07 | End: 2021-06-09 | Stop reason: HOSPADM

## 2021-06-07 RX ORDER — LABETALOL HYDROCHLORIDE 5 MG/ML
20 INJECTION, SOLUTION INTRAVENOUS
Status: COMPLETED | OUTPATIENT
Start: 2021-06-07 | End: 2021-06-08

## 2021-06-07 RX ORDER — LIDOCAINE HYDROCHLORIDE 10 MG/ML
20 INJECTION, SOLUTION EPIDURAL; INFILTRATION; INTRACAUDAL; PERINEURAL AS NEEDED
Status: DISCONTINUED | OUTPATIENT
Start: 2021-06-07 | End: 2021-06-09 | Stop reason: HOSPADM

## 2021-06-07 RX ADMIN — SODIUM CHLORIDE 2.5 MILLION UNITS: 9 INJECTION, SOLUTION INTRAVENOUS at 15:00

## 2021-06-07 RX ADMIN — NIFEDIPINE 20 MG: 10 CAPSULE ORAL at 08:14

## 2021-06-07 RX ADMIN — FENTANYL CITRATE 100 MCG: 50 INJECTION, SOLUTION INTRAMUSCULAR; INTRAVENOUS at 13:38

## 2021-06-07 RX ADMIN — LIDOCAINE HYDROCHLORIDE,EPINEPHRINE BITARTRATE 2 ML: 15; .005 INJECTION, SOLUTION EPIDURAL; INFILTRATION; INTRACAUDAL; PERINEURAL at 13:47

## 2021-06-07 RX ADMIN — SODIUM CHLORIDE, SODIUM LACTATE, POTASSIUM CHLORIDE, AND CALCIUM CHLORIDE 125 ML/HR: 600; 310; 30; 20 INJECTION, SOLUTION INTRAVENOUS at 19:00

## 2021-06-07 RX ADMIN — SODIUM CHLORIDE, SODIUM LACTATE, POTASSIUM CHLORIDE, AND CALCIUM CHLORIDE 1000 ML: 600; 310; 30; 20 INJECTION, SOLUTION INTRAVENOUS at 13:30

## 2021-06-07 RX ADMIN — DIPHENHYDRAMINE HYDROCHLORIDE 12.5 MG: 50 INJECTION, SOLUTION INTRAMUSCULAR; INTRAVENOUS at 18:43

## 2021-06-07 RX ADMIN — LIDOCAINE HYDROCHLORIDE 5 MG: 10 INJECTION, SOLUTION INFILTRATION; PERINEURAL at 13:43

## 2021-06-07 RX ADMIN — SODIUM CHLORIDE, SODIUM LACTATE, POTASSIUM CHLORIDE, AND CALCIUM CHLORIDE 125 ML/HR: 600; 310; 30; 20 INJECTION, SOLUTION INTRAVENOUS at 13:59

## 2021-06-07 RX ADMIN — SODIUM CHLORIDE, SODIUM LACTATE, POTASSIUM CHLORIDE, AND CALCIUM CHLORIDE 125 ML/HR: 600; 310; 30; 20 INJECTION, SOLUTION INTRAVENOUS at 11:00

## 2021-06-07 RX ADMIN — SODIUM CHLORIDE 5 MILLION UNITS: 900 INJECTION INTRAVENOUS at 11:00

## 2021-06-07 RX ADMIN — FUROSEMIDE 10 MG: 10 INJECTION, SOLUTION INTRAMUSCULAR; INTRAVENOUS at 06:32

## 2021-06-07 RX ADMIN — OXYTOCIN 6 MILLI-UNITS/MIN: 10 INJECTION, SOLUTION INTRAMUSCULAR; INTRAVENOUS at 09:00

## 2021-06-07 RX ADMIN — FENTANYL CITRATE 12 ML/HR: 0.05 INJECTION, SOLUTION INTRAMUSCULAR; INTRAVENOUS at 13:51

## 2021-06-07 RX ADMIN — FENTANYL CITRATE 12 ML/HR: 0.05 INJECTION, SOLUTION INTRAMUSCULAR; INTRAVENOUS at 20:28

## 2021-06-07 RX ADMIN — SODIUM CHLORIDE, SODIUM LACTATE, POTASSIUM CHLORIDE, CALCIUM CHLORIDE, AND DEXTROSE MONOHYDRATE 25 ML/HR: 600; 310; 30; 20; 5 INJECTION, SOLUTION INTRAVENOUS at 06:30

## 2021-06-07 RX ADMIN — DIPHENHYDRAMINE HYDROCHLORIDE 12.5 MG: 50 INJECTION, SOLUTION INTRAMUSCULAR; INTRAVENOUS at 23:13

## 2021-06-07 RX ADMIN — FENTANYL CITRATE 100 MCG: 50 INJECTION, SOLUTION INTRAMUSCULAR; INTRAVENOUS at 13:50

## 2021-06-07 RX ADMIN — NIFEDIPINE 20 MG: 10 CAPSULE ORAL at 21:28

## 2021-06-07 RX ADMIN — SODIUM CHLORIDE 2.5 MILLION UNITS: 9 INJECTION, SOLUTION INTRAVENOUS at 23:13

## 2021-06-07 RX ADMIN — LIDOCAINE HYDROCHLORIDE,EPINEPHRINE BITARTRATE 3 ML: 15; .005 INJECTION, SOLUTION EPIDURAL; INFILTRATION; INTRACAUDAL; PERINEURAL at 13:37

## 2021-06-07 RX ADMIN — SODIUM CHLORIDE 2.5 MILLION UNITS: 9 INJECTION, SOLUTION INTRAVENOUS at 19:00

## 2021-06-07 NOTE — PROGRESS NOTES
0455: Pt is a 38 y.o.  at 36w6d, arrived to L&D triage with c/o of SOB, LE swelling, and pain in knees; denies CTX, LOF and VB; reports DFM. MD at bedside for assessment see MD note. EFM and TOCO applied, call bell within reach.     0510: pt reports +FM

## 2021-06-07 NOTE — PROGRESS NOTES
Note elevated BP  152/80  [170/104(was an incorrect artifact caused by wrong cuff on forearm)]  Awaiting further pressures. Note h/o previous PIH with last preg. No current meds this preg for hypertension.     DT

## 2021-06-07 NOTE — H&P
Ostetrical History and Physical    Subjective:     Date of Admission: 2021    Patient is a 38 y.o. Eloy Jorge  female admitted with gestational diabetes diet only. Severe SOB, significantly worse lying flat. Some low pelvic and low back pain, but no contractions. Bilateral ankle edema, becoming worse. For Obstetric history, see prenantal.    For Review of Systems, see prenatal    Past Medical History:   Diagnosis Date    Anemia     Gestational diabetes     Gestational hypertension     [facility]     Morbid obesity with BMI of 40.0-44.9, adult ([facility]     Prediabetes       No past surgical history on file. Prior to Admission medications    Medication Sig Start Date End Date Taking? Authorizing Provider   ferrous sulfate 325 mg (65 mg iron) tablet Take  by mouth Daily (before breakfast). Provider, Historical   labetalol (NORMODYNE) 200 mg tablet Take 1 Tab by mouth daily. Indications: high blood pressure 19   Carol Coleman CNM   ibuprofen (MOTRIN) 800 mg tablet Take 1 Tab by mouth every eight (8) hours as needed for Pain. 19   Carol Coleman CNM   PNV No12-Iron-FA-DSS-OM-3 29 mg iron-1 mg -50 mg CPKD Take 1 Tab by mouth. Provider, Historical     No Known Allergies   Social History     Tobacco Use    Smoking status: Former Smoker     Quit date: 2018     Years since quittin.8    Smokeless tobacco: Never Used   Substance Use Topics    Alcohol use: Not Currently      No family history on file. Objective:     Height 6' 1\" (1.854 m), weight 111.6 kg (246 lb), not currently breastfeeding. No data recorded. No intake/output data recorded. No intake/output data recorded. HEENT: No pallor, no jaundice, Thyroid and throat normal  RESPIRATORY: Clear to A & P, no rhales  CVS: pulse fast, HS normal, pO2 = 98%  ABDOMEN: Gravid. Vertex. EFW=large. Mild low pelvic tenderness. Pelvic: Cervix 1,   Effaced: 10%  Station: -4  EXTREMETIES.   Bilat 2+ edema with no calf or lwoer leg tenderness and no saphenous tenderness  Data Review:   No results found for this or any previous visit (from the past 24 hour(s)). Monitor:  Reactivity:present Variability:present Baseline:within normal limits    Assessment:     Principal Problem:    Swelling of both ankles (6/7/2021)    Active Problems:    Gestational diabetes (5/13/2019)      Anemia during pregnancy (5/15/2019)      Morbid obesity (HCC) ()      Back pain (6/7/2021)      Shortness of breath during pregnancy (6/7/2021)      Decreased fetal movement affecting management of pregnancy in third trimester, fetus 1 (6/7/2021)        Plan:   Lab, CXR, EKG. Monitor    Check labs:  Cardiac enzymes  CBC  CMP  Urinalysis  Check  Prenatal:    Disposition:     Type of admit:Out Paitent    I saw and examined patient.     Signed By: Yudelka Head MD                         June 7, 2021

## 2021-06-07 NOTE — ANESTHESIA PROCEDURE NOTES
Epidural Block    Patient location during procedure: OB  Start time: 6/7/2021 1:37 PM  End time: 6/7/2021 1:56 PM  Reason for block: labor epidural  Staffing  Performed: attending   Anesthesiologist: Jessica Ulloa MD  Preanesthetic Checklist  Completed: patient identified, IV checked, site marked, risks and benefits discussed, surgical consent, monitors and equipment checked, pre-op evaluation and timeout performed  Block Placement  Patient position: sitting  Prep: ChloraPrep  Sterility prep: cap, drape, gloves, hand and mask  Sedation level: no sedation  Patient monitoring: continuous pulse oximetry, frequent blood pressure checks and heart rate  Approach: midline  Location: lumbar  Epidural  Loss of resistance technique: saline  Guidance: landmark technique and ultrasound guided  Needle  Needle type: Tuohy   Needle gauge: 17 G  Needle length: 9 cm  Needle insertion depth: 7 cm  Catheter type: multi-orifice  Catheter size: 20 G  Catheter at skin depth: 12 cm  Catheter securement method: clear occlusive dressing, liquid medical adhesive and surgical tape  Test dose: negative  Medications Administered  Lidocaine-EPINEPHrine (XYLOCAINE) 1.5 %-1:200,000 Epidural, 3 mL  Assessment  Sensory level: T10  Block outcome: pain improved  Number of attempts: 2  Procedure assessment: patient tolerated procedure well with no immediate complications  Additional Notes  After epidural placement, patient without sensory or motor block. Patient denies headache or backache.

## 2021-06-07 NOTE — PROGRESS NOTES
06/07/21 0540 06/07/21 0543 06/07/21 0546   Maternal Vital Signs   Pulse (Heart Rate) 80  --  87   O2 Sat (%)  --  100 %  --    Pulse via Oximetry  --  82 beats per minute  --    BP (!) 168/97  (md trujillo aware)  --  (!) 167/76  (md trujillo aware, orders recieved)   MAP (Monitor) 114  --  99   MAP (Calculated) 121  --  106      06/07/21 0548   Maternal Vital Signs   Pulse (Heart Rate)  --    O2 Sat (%) 100 %   Pulse via Oximetry 94 beats per minute   BP  --    MAP (Monitor)  --    MAP (Calculated)  --      0553: MD trujillo aware of increased BP, orders received. 1839: Bedside shift change report given to JAZMYN Rae rn by SINDHU Gibson, [facility] . Report included the following information SBAR.

## 2021-06-07 NOTE — PROGRESS NOTES
With this much orthopnea, she should be delivered before discharge. Note PIH, reassess after lasix.     DT

## 2021-06-07 NOTE — PROGRESS NOTES
[address] assumed of patient at this time. EKG being performed at this time. 7499 Chest xray being performed at this time. 0630 Orders to admit patient for induction. 9798 Bedside shift change report given to Bernardo Diaz RN (oncoming nurse) by Stefan Traore RN (offgoing nurse). Report included the following information SBAR, Intake/Output, MAR and Recent Results.

## 2021-06-07 NOTE — ANESTHESIA PREPROCEDURE EVALUATION
Relevant Problems   RESPIRATORY SYSTEM   (+) Orthopnea   (+) Shortness of breath during pregnancy      CARDIOVASCULAR   (+) Pregnancy induced hypertension, third trimester      ENDOCRINE   (+) Gestational diabetes   (+) Morbid obesity (HCC)      HEMATOLOGY   (+) Anemia   (+) Anemia during pregnancy       Anesthetic History   No history of anesthetic complications            Review of Systems / Medical History  Patient summary reviewed, nursing notes reviewed and pertinent labs reviewed    Pulmonary        Sleep apnea           Neuro/Psych   Within defined limits           Cardiovascular    Hypertension              Exercise tolerance: >4 METS  Comments: Fluid overload s/p lasix   GI/Hepatic/Renal  Within defined limits              Endo/Other    Diabetes    Morbid obesity     Other Findings            Physical Exam    Airway  Mallampati: III  TM Distance: 4 - 6 cm  Neck ROM: decreased range of motion   Mouth opening: Diminished (comment)     Cardiovascular  Regular rate and rhythm,  S1 and S2 normal,  no murmur, click, rub, or gallop  Rhythm: regular  Rate: normal         Dental  No notable dental hx       Pulmonary  Breath sounds clear to auscultation               Abdominal  GI exam deferred       Other Findings            Anesthetic Plan    ASA: 3, emergent  Anesthesia type: epidural            Anesthetic plan and risks discussed with: Patient and Spouse      Risks of bleeding, infection, nerve injury, failed block, spinal tap causing headache and requiring epidural blood patch, back pain, and failed block discussed. Procedure described as elective. Pt understands and desires to proceed.   Dose existing epidural for C/S

## 2021-06-07 NOTE — ROUTINE PROCESS
0715 Bedside and Verbal shift change report given to BENNY Mendoza (oncoming nurse) by Terese Baker RN (offgoing nurse). Report included the following information SBAR, Kardex, Procedure Summary, Intake/Output, MAR, Accordion and Recent Results. 0730 Pt in high fowlers, US and TOCO adjusted. Call bell within reach. Bed side commode emptied. 26 Dr Earna Seip at bedside. SVE: 2-3/long/-3. Verbal and read back orders for Procardia and Pitocin Induction received. 0810 Pt in high fowlers. US and TOCO adjusted. Pt denies needs at this time. Call bell within reach. 4236 Pt turned on left lateral with pillow in between her legs. US and TOCO adjusted. Call bell within reach. 2516 Pt in Throne's position. US and TOCO adjusted. Call bell within reach. 7392 Pt ambulating to bed commode. Voiding.    0929 Pt on right lateral with pillow in between her legs. US and TOCO adjusted. Call bell within reach. 2351 [address] on phone. RPR test and GBS prophylaxis verbal and read back orders received. 1025 Pt turned on left lateral with pillow in between her legs. US and TOCO adjusted. Call bell within reach. 1130 Pt ambulating to the bed commode. Voiding. 1143 Pt on left lateral with pillow in between her legs. US and TOCO adjusted. Call bell within reach. 1215  Pt in high fowlers. US and TOCO adjusted. Call bell within reach. 1301 Paged anesthesia. 1308 Pt on right lateral with pillow in between her legs. US and TOCO adjusted. Call bell within reach. 1304 Pt ambulating to the bed commode. Voiding. 1310 Paged anesthesia. 12 Dr. Arabella Cary at bedside explaining epidural procedure, side effects, risks, benefits, and positioning. Patient verbalizes understanding. Time-out:1337  Procedure start:  2644  Catheter in, needle out:1347  Test dose: 1349  Fentanyl vial handed to MD at bedside. Loading dose:1350  Patient connected to pump:1351    1400 Pt in low fowlers, US and TOCO adjusted.  RN at bedside. 1410 Pt on bed pan. Voiding. 1416 Pt in high fowlers. US and TOCO adjusted. Call bell within reach. [address] [facility] [address], no further orders received. 1505 SVE: 4/50/-3    1513 Pt turned on right lateral with peanut ball in within. US and TOCO adjusted. Call bell within reach. 1639 Pt turned on left lateral with peanut ball within. US and TOCO adjusted. 1716 Pt on bed pan. Unable to void. 1723 Pt in Throne's position. US and TOCO adjusted. Call bell within reach. 1840 Pt on bed pan, Voiding. New bed pad and new gown provided. 1905 Pt turned on right lateral. US and TOCO adjusted. 1915 Bedside, Verbal  shift change report given to MADELINE Arboleda (oncoming nurse) by BENNY Laughlin (offgoing nurse). Report included the following information SBAR, Kardex, Procedure Summary, Intake/Output, MAR, Accordion and Recent Results. 1937 Called lab looking for RPR results. Results should be back tomorrow morning.

## 2021-06-07 NOTE — PROGRESS NOTES
Lab mostly normal  No obvious toxemic changes, no DIC. CXRay not back yet  EKG, no change from last exam.     Lasix given    PRobably should start induction with alcantara bulb    Disc care with Dr. Kehinde Anderson.

## 2021-06-08 ENCOUNTER — APPOINTMENT (OUTPATIENT)
Dept: CT IMAGING | Age: 41
DRG: 540 | End: 2021-06-08
Attending: FAMILY MEDICINE
Payer: MEDICAID

## 2021-06-08 ENCOUNTER — APPOINTMENT (OUTPATIENT)
Dept: NON INVASIVE DIAGNOSTICS | Age: 41
DRG: 540 | End: 2021-06-08
Attending: FAMILY MEDICINE
Payer: MEDICAID

## 2021-06-08 PROBLEM — R06.81 WITNESSED EPISODE OF APNEA: Status: ACTIVE | Noted: 2021-06-08

## 2021-06-08 PROBLEM — R09.02 HYPOXEMIA: Status: ACTIVE | Noted: 2021-06-08

## 2021-06-08 PROBLEM — R06.83 SNORING: Status: ACTIVE | Noted: 2021-06-08

## 2021-06-08 LAB
ALBUMIN SERPL-MCNC: 2.5 G/DL (ref 3.4–5)
ALBUMIN/GLOB SERPL: 0.6 {RATIO} (ref 0.8–1.7)
ALP SERPL-CCNC: 96 U/L (ref 45–117)
ALT SERPL-CCNC: 15 U/L (ref 13–56)
ANION GAP SERPL CALC-SCNC: 9 MMOL/L (ref 3–18)
ARTERIAL PATENCY WRIST A: POSITIVE
AST SERPL-CCNC: 14 U/L (ref 10–38)
BASE DEFICIT BLD-SCNC: 3 MMOL/L
BASOPHILS # BLD: 0 K/UL (ref 0–0.1)
BASOPHILS NFR BLD: 0 % (ref 0–2)
BDY SITE: ABNORMAL
BILIRUB SERPL-MCNC: 0.3 MG/DL (ref 0.2–1)
BNP SERPL-MCNC: 17 PG/ML (ref 0–450)
BUN SERPL-MCNC: 8 MG/DL (ref 7–18)
BUN/CREAT SERPL: 12 (ref 12–20)
CALCIUM SERPL-MCNC: 8.8 MG/DL (ref 8.5–10.1)
CHLORIDE SERPL-SCNC: 106 MMOL/L (ref 100–111)
CK MB CFR SERPL CALC: NORMAL % (ref 0–4)
CK MB SERPL-MCNC: <1 NG/ML (ref 5–25)
CK SERPL-CCNC: 86 U/L (ref 26–192)
CO2 SERPL-SCNC: 22 MMOL/L (ref 21–32)
CREAT SERPL-MCNC: 0.67 MG/DL (ref 0.6–1.3)
DIFFERENTIAL METHOD BLD: ABNORMAL
ECHO AO ASC DIAM: 3.62 CM
ECHO AV ANNULUS DIAM: 3.77 CM
ECHO AV AREA PEAK VELOCITY: 1.71 CM2
ECHO AV AREA VTI: 1.59 CM2
ECHO AV AREA/BSA PEAK VELOCITY: 0.6 CM2/M2
ECHO AV AREA/BSA VTI: 0.6 CM2/M2
ECHO AV MEAN GRADIENT: 4.98 MMHG
ECHO AV PEAK GRADIENT: 9.09 MMHG
ECHO AV PEAK VELOCITY: 151 CM/S
ECHO AV VTI: 26.06 CM
ECHO LA VOL 2C: 73.01 ML (ref 22–52)
ECHO LA VOL 4C: 55.84 ML (ref 22–52)
ECHO LA VOL BP: 72.69 ML (ref 22–52)
ECHO LV E' LATERAL VELOCITY: 8 CM/S
ECHO LV E' SEPTAL VELOCITY: 6 CM/S
ECHO LV INTERNAL DIMENSION DIASTOLIC: 5.16 CM (ref 3.9–5.3)
ECHO LV INTERNAL DIMENSION SYSTOLIC: 3.2 CM
ECHO LV IVSD: 1.2 CM (ref 0.6–0.9)
ECHO LV MASS 2D: 254.4 G (ref 67–162)
ECHO LV MASS INDEX 2D: 93.5 G/M2 (ref 43–95)
ECHO LV POSTERIOR WALL DIASTOLIC: 1.26 CM (ref 0.6–0.9)
ECHO LVOT CARDIAC OUTPUT: 3.79 L/MIN
ECHO LVOT DIAM: 2.01 CM
ECHO LVOT PEAK GRADIENT: 2.64 MMHG
ECHO LVOT PEAK VELOCITY: 81 CM/S
ECHO LVOT SV: 41.4 ML
ECHO LVOT VTI: 13.02 CM
ECHO MV A VELOCITY: 74 CM/S
ECHO MV AREA PHT: 2.8 CM2
ECHO MV E DECELERATION TIME (DT): 270.98 MS
ECHO MV E VELOCITY: 81 CM/S
ECHO MV E/A RATIO: 1.09
ECHO MV E/E' LATERAL: 10.13
ECHO MV E/E' RATIO (AVERAGED): 11.81
ECHO MV E/E' SEPTAL: 13.5
ECHO MV PRESSURE HALF TIME (PHT): 78.58 MS
ECHO RA AREA 4C: 23 CM2
ECHO RV INTERNAL DIMENSION: 5.59 CM
EOSINOPHIL # BLD: 0 K/UL (ref 0–0.4)
EOSINOPHIL NFR BLD: 0 % (ref 0–5)
ERYTHROCYTE [DISTWIDTH] IN BLOOD BY AUTOMATED COUNT: 14.3 % (ref 11.6–14.5)
FIBRINOGEN PPP-MCNC: 563 MG/DL (ref 210–451)
GAS FLOW.O2 O2 DELIVERY SYS: ABNORMAL L/MIN
GLOBULIN SER CALC-MCNC: 4 G/DL (ref 2–4)
GLUCOSE BLD STRIP.AUTO-MCNC: 136 MG/DL (ref 70–110)
GLUCOSE BLD STRIP.AUTO-MCNC: 150 MG/DL (ref 70–110)
GLUCOSE BLD STRIP.AUTO-MCNC: 185 MG/DL (ref 70–110)
GLUCOSE SERPL-MCNC: 147 MG/DL (ref 74–99)
HCO3 BLD-SCNC: 21.1 MMOL/L (ref 22–26)
HCT VFR BLD AUTO: 31.1 % (ref 35–45)
HGB BLD-MCNC: 9.9 G/DL (ref 12–16)
LVOT MG: 1.28 MMHG
LYMPHOCYTES # BLD: 1.1 K/UL (ref 0.9–3.6)
LYMPHOCYTES NFR BLD: 13 % (ref 21–52)
MCH RBC QN AUTO: 26.2 PG (ref 24–34)
MCHC RBC AUTO-ENTMCNC: 31.8 G/DL (ref 31–37)
MCV RBC AUTO: 82.3 FL (ref 74–97)
MONOCYTES # BLD: 0.6 K/UL (ref 0.05–1.2)
MONOCYTES NFR BLD: 7 % (ref 3–10)
NEUTS SEG # BLD: 7.2 K/UL (ref 1.8–8)
NEUTS SEG NFR BLD: 79 % (ref 40–73)
O2/TOTAL GAS SETTING VFR VENT: 28 %
PCO2 BLD: 33 MMHG (ref 35–45)
PH BLD: 7.41 [PH] (ref 7.35–7.45)
PLATELET # BLD AUTO: 218 K/UL (ref 135–420)
PMV BLD AUTO: 11.8 FL (ref 9.2–11.8)
PO2 BLD: 98 MMHG (ref 80–100)
POTASSIUM SERPL-SCNC: 3.9 MMOL/L (ref 3.5–5.5)
PROT SERPL-MCNC: 6.5 G/DL (ref 6.4–8.2)
RBC # BLD AUTO: 3.78 M/UL (ref 4.2–5.3)
SAO2 % BLD: 97.8 % (ref 92–97)
SERVICE CMNT-IMP: ABNORMAL
SODIUM SERPL-SCNC: 137 MMOL/L (ref 136–145)
SPECIMEN TYPE: ABNORMAL
TROPONIN I SERPL-MCNC: <0.02 NG/ML (ref 0–0.04)
WBC # BLD AUTO: 9.1 K/UL (ref 4.6–13.2)

## 2021-06-08 PROCEDURE — 74011250636 HC RX REV CODE- 250/636: Performed by: OBSTETRICS & GYNECOLOGY

## 2021-06-08 PROCEDURE — 77010026065 HC OXYGEN MINIMUM MEDICAL AIR

## 2021-06-08 PROCEDURE — 65270000029 HC RM PRIVATE

## 2021-06-08 PROCEDURE — 82962 GLUCOSE BLOOD TEST: CPT

## 2021-06-08 PROCEDURE — 74011000258 HC RX REV CODE- 258: Performed by: ANESTHESIOLOGY

## 2021-06-08 PROCEDURE — 75410000002 HC LABOR FEE PER 1 HR

## 2021-06-08 PROCEDURE — 80053 COMPREHEN METABOLIC PANEL: CPT

## 2021-06-08 PROCEDURE — 77030040830 HC CATH URETH FOL MDII -A

## 2021-06-08 PROCEDURE — 74011250637 HC RX REV CODE- 250/637: Performed by: OBSTETRICS & GYNECOLOGY

## 2021-06-08 PROCEDURE — 74011250636 HC RX REV CODE- 250/636: Performed by: ANESTHESIOLOGY

## 2021-06-08 PROCEDURE — 36415 COLL VENOUS BLD VENIPUNCTURE: CPT

## 2021-06-08 PROCEDURE — 85384 FIBRINOGEN ACTIVITY: CPT

## 2021-06-08 PROCEDURE — 82553 CREATINE MB FRACTION: CPT

## 2021-06-08 PROCEDURE — 83880 ASSAY OF NATRIURETIC PEPTIDE: CPT

## 2021-06-08 PROCEDURE — 93306 TTE W/DOPPLER COMPLETE: CPT

## 2021-06-08 PROCEDURE — 76060000078 HC EPIDURAL ANESTHESIA

## 2021-06-08 PROCEDURE — 3E0P7VZ INTRODUCTION OF HORMONE INTO FEMALE REPRODUCTIVE, VIA NATURAL OR ARTIFICIAL OPENING: ICD-10-PCS | Performed by: OBSTETRICS & GYNECOLOGY

## 2021-06-08 PROCEDURE — 3E033VJ INTRODUCTION OF OTHER HORMONE INTO PERIPHERAL VEIN, PERCUTANEOUS APPROACH: ICD-10-PCS | Performed by: OBSTETRICS & GYNECOLOGY

## 2021-06-08 PROCEDURE — 36600 WITHDRAWAL OF ARTERIAL BLOOD: CPT

## 2021-06-08 PROCEDURE — 85025 COMPLETE CBC W/AUTO DIFF WBC: CPT

## 2021-06-08 PROCEDURE — 71275 CT ANGIOGRAPHY CHEST: CPT

## 2021-06-08 PROCEDURE — 74011000636 HC RX REV CODE- 636: Performed by: OBSTETRICS & GYNECOLOGY

## 2021-06-08 PROCEDURE — 82803 BLOOD GASES ANY COMBINATION: CPT

## 2021-06-08 PROCEDURE — 74011000250 HC RX REV CODE- 250: Performed by: OBSTETRICS & GYNECOLOGY

## 2021-06-08 PROCEDURE — 74011000258 HC RX REV CODE- 258: Performed by: OBSTETRICS & GYNECOLOGY

## 2021-06-08 PROCEDURE — 74011636637 HC RX REV CODE- 636/637: Performed by: OBSTETRICS & GYNECOLOGY

## 2021-06-08 RX ORDER — OXYTOCIN/0.9 % SODIUM CHLORIDE 30/500 ML
0-20 PLASTIC BAG, INJECTION (ML) INTRAVENOUS
Status: DISCONTINUED | OUTPATIENT
Start: 2021-06-08 | End: 2021-06-11 | Stop reason: HOSPADM

## 2021-06-08 RX ORDER — MISOPROSTOL 100 UG/1
25 TABLET ORAL EVERY 4 HOURS
Status: DISCONTINUED | OUTPATIENT
Start: 2021-06-08 | End: 2021-06-08

## 2021-06-08 RX ORDER — NIFEDIPINE 10 MG/1
20 CAPSULE ORAL 3 TIMES DAILY
Status: DISCONTINUED | OUTPATIENT
Start: 2021-06-09 | End: 2021-06-09

## 2021-06-08 RX ORDER — INSULIN LISPRO 100 [IU]/ML
5 INJECTION, SOLUTION INTRAVENOUS; SUBCUTANEOUS ONCE
Status: COMPLETED | OUTPATIENT
Start: 2021-06-08 | End: 2021-06-08

## 2021-06-08 RX ORDER — MISOPROSTOL 100 UG/1
25 TABLET ORAL ONCE
Status: DISCONTINUED | OUTPATIENT
Start: 2021-06-08 | End: 2021-06-08

## 2021-06-08 RX ORDER — OXYTOCIN/0.9 % SODIUM CHLORIDE 30/500 ML
0-20 PLASTIC BAG, INJECTION (ML) INTRAVENOUS
Status: DISCONTINUED | OUTPATIENT
Start: 2021-06-08 | End: 2021-06-08

## 2021-06-08 RX ADMIN — DIPHENHYDRAMINE HYDROCHLORIDE 12.5 MG: 50 INJECTION, SOLUTION INTRAMUSCULAR; INTRAVENOUS at 07:35

## 2021-06-08 RX ADMIN — SODIUM CHLORIDE 2.5 MILLION UNITS: 9 INJECTION, SOLUTION INTRAVENOUS at 03:20

## 2021-06-08 RX ADMIN — SODIUM CHLORIDE, SODIUM LACTATE, POTASSIUM CHLORIDE, AND CALCIUM CHLORIDE 125 ML/HR: 600; 310; 30; 20 INJECTION, SOLUTION INTRAVENOUS at 23:26

## 2021-06-08 RX ADMIN — INSULIN LISPRO 5 UNITS: 100 INJECTION, SOLUTION INTRAVENOUS; SUBCUTANEOUS at 12:47

## 2021-06-08 RX ADMIN — MISOPROSTOL 25 MCG: 100 TABLET ORAL at 09:36

## 2021-06-08 RX ADMIN — NIFEDIPINE 20 MG: 10 CAPSULE ORAL at 08:17

## 2021-06-08 RX ADMIN — FENTANYL CITRATE 12 ML/HR: 0.05 INJECTION, SOLUTION INTRAMUSCULAR; INTRAVENOUS at 10:14

## 2021-06-08 RX ADMIN — LABETALOL HYDROCHLORIDE 20 MG: 5 INJECTION INTRAVENOUS at 07:00

## 2021-06-08 RX ADMIN — SODIUM CHLORIDE 2.5 MILLION UNITS: 9 INJECTION, SOLUTION INTRAVENOUS at 11:10

## 2021-06-08 RX ADMIN — SODIUM CHLORIDE 2.5 MILLION UNITS: 9 INJECTION, SOLUTION INTRAVENOUS at 18:36

## 2021-06-08 RX ADMIN — FENTANYL CITRATE 12 ML/HR: 0.05 INJECTION, SOLUTION INTRAMUSCULAR; INTRAVENOUS at 03:20

## 2021-06-08 RX ADMIN — OXYTOCIN 6 MILLI-UNITS/MIN: 10 INJECTION, SOLUTION INTRAMUSCULAR; INTRAVENOUS at 22:09

## 2021-06-08 RX ADMIN — NIFEDIPINE 20 MG: 10 CAPSULE ORAL at 18:35

## 2021-06-08 RX ADMIN — SODIUM CHLORIDE 2.5 MILLION UNITS: 9 INJECTION, SOLUTION INTRAVENOUS at 22:14

## 2021-06-08 RX ADMIN — SODIUM CHLORIDE 2.5 MILLION UNITS: 9 INJECTION, SOLUTION INTRAVENOUS at 07:35

## 2021-06-08 RX ADMIN — SODIUM CHLORIDE, SODIUM LACTATE, POTASSIUM CHLORIDE, AND CALCIUM CHLORIDE 125 ML/HR: 600; 310; 30; 20 INJECTION, SOLUTION INTRAVENOUS at 14:07

## 2021-06-08 RX ADMIN — DIPHENHYDRAMINE HYDROCHLORIDE 12.5 MG: 50 INJECTION, SOLUTION INTRAMUSCULAR; INTRAVENOUS at 03:39

## 2021-06-08 RX ADMIN — IOPAMIDOL 75 ML: 755 INJECTION, SOLUTION INTRAVENOUS at 17:14

## 2021-06-08 NOTE — PROGRESS NOTES
Just one dose of cytotec. CT delayed by obtaining IV. Now going down. If neg, start pit on return from XR. Note results from [facility] other studies. Monitor:  Reactivity:present Variability:present Baseline:within normal limits  Contractions q none    Vitals:  Blood pressure 138/73, pulse 88, temperature 99.3 °F (37.4 °C), resp. rate 22, height 6' 1\" (1.854 m), weight 156.9 kg (346 lb), SpO2 100 %, not currently breastfeeding. Pelvic exam:  3-4cm  Head now palpable and anterior. High    Plan:     Restart pit if ct neg    Discussed case with both cardiac at pulmonary at bedside. Restart cytotec x 1 dose. Then restart pit.       Signed By: Joan Gallego MD                         June 8, 2021

## 2021-06-08 NOTE — PROGRESS NOTES
Work up proceeding. Using cytotec for induction as interruptions from the workup would slow pitocin induction. Monitor:  Reactivity:present Variability:present Baseline:within normal limits  Contractions not detectable. Vitals:  Blood pressure 138/73, pulse 88, temperature 99.3 °F (37.4 °C), resp. rate 22, height 6' 1\" (1.854 m), weight 156.9 kg (346 lb), SpO2 100 %, not currently breastfeeding.      Pelvic exam:  deferred    Plan:     Continue with cytotec      Signed By: Dustin Crooks MD                         June 8, 2021

## 2021-06-08 NOTE — PROGRESS NOTES
1915:  Assumed care of pt from Mahesh Perez, RN  Pt in room with epidural in place, Pitocin 12 and LR at 125. Pt has no c/o pain a this time but is agitated her labor has not progressed. Plan of care discussed with pt, pt expressed understanding. 2313  Pt c/o itching. PRN Benadryl administered. Bed an offered to pt, voided 500    2342:  PT no long c/o itching    0019:  Pt concerned her labor is not progressing, pt  Request another form of induction. SVE 4/thick, floating    0025:  Spoke with Dr Arian Santos re pt concerns. Order to continue pitocin up to 16mu and he may come rupture membranes in the morning    0540:  Pt unable to sleep through shift due to waking up with decreased sats, Pt has apneic episodes and wakes up through shift. Pt given a incentive spirometer, will conultt with OB    0656:  Dr Katina Swanson at bedside, ok to pause pit and have breakfast, then restart at half pit and continue up to 25mu.   Vertex confirmed by ultrasound    1420:  Hospitalist at bedside, labs echo of heart and cardiology consult

## 2021-06-08 NOTE — PROGRESS NOTES
Cannot lay flat, could not have Foleybulb inserted, because of this. Improvement with lasix yesterday. Obvious severe sleep apnea (80% sat). Pitocin up to 18, contractions q 2-3. Epidural working. Monitor:  Reactivity:present Variability:present Baseline:within normal limits  Contractions q 2-4    Vitals:  Blood pressure (!) 163/97, pulse (!) 119, temperature 98.8 °F (37.1 °C), resp. rate 18, height 6' 1\" (1.854 m), weight 111.6 kg (246 lb), SpO2 95 %, not currently breastfeeding. Pelvic exam:  Cervix 4   Effaced: 50%Station:  Head is not reachable. Vertex confirmed with Usound. Head is well down by Usound but not in pelvis. Orthopnea seems to be slightly improved (we have not repeated lasix since admit.)  Hospitalist called (they did not see yesterday). Swelling is improved a little  Baby doing very well so far (even through  80%sat spells with sleep apnea). Baby is large, but I doubt bigger than her previous at 9lb 14oz. Plan:     Hospitalist consult, cardiac if necessary  Repeat tox lab  Continue with nifedipene. Catalina Hernandez yesterday showed possible early pulmonary edema . Stop pit, light breakfast, restart pit. Considered cytotec but pit is probably better  May need more lasix, but will need to watch epidural if so.         Signed By: Marco Vu MD                         June 8, 2021

## 2021-06-08 NOTE — ROUTINE PROCESS
Bedside and Verbal shift change report given to Virginia Morales RN (oncoming nurse) by Amos Solo RN (offgoing nurse). Report included the following information SBAR, Kardex, Intake/Output, MAR, Recent Results and Med Rec.

## 2021-06-08 NOTE — PROGRESS NOTES
Inpatient medical team update:     Acute respiratory failure with hypoxia likely due to severe sleep apnea   Snoring   Paroxysmal nocturnal dyspnea   BLE edema   Intermittent tachycardia   H/o gestational diabetes   Pregnancy induced hypertension   Morbid obesity   Intrauterine pregnancy     Evaluation completed today. Cardiac enzymes and proBNP within normal limits. Review echocardiogram with cardiology, Dr. Gildardo Orellana and it only showed grade 1 diastolic dysfunction with normal EF. Pulmonology consult placed. Discussed with Dr. Sidra Carrel. CTA chest with and without contrast discussed with primary team, Dr. Brock Mariano who is okay with pregnant patient receiving this scan. CTA chest with and without contrast did not demonstrate PE. O2 sat 98 to 100% on room air currently. Use O2 to keep O2 sat more than 91%. Monitor for sleep apnea events while patient sleeping. Pulmonology has recommended the use of CPAP if necessary. Recommend expeditious delivery of patient. If delivery cannot be accomplished within a reasonably expeditious timeframe would recommend transfer to a tertiary care facility where [facility] backup is available. Concerned that patient has elevated risk for postpartum complications given overall history and co-morbidities. Inpatient medical team signing off for now. Please feel free to call us with any questions or concerns.

## 2021-06-08 NOTE — CONSULTS
[facility]                  Pulmonary, Critical Care, and Sleep Medicine     Name: Joshua Livingston MRN: [id number]   : 1980 Hospital: [facility] [place]    Date: 2021        PCCM Note                                              Consult requesting physician: Dr. Diego Chung  Reason for Consult: Hypoxemia      IMPRESSION:   Hypoxemia R09.02   Snoring R06.83   Witnessed episode of apnea R06.81     Morbid obesity ([facility] [address] E66.01   Intrauterine pregnancy. ·   Patient Active Problem List   Diagnosis Code    Gestational diabetes O23.80    Perineal laceration, first degree, delivered O70.0    Anemia during pregnancy O99.019    Morbid obesity ([facility] [address] E66.01    Morbid obesity with BMI of 40.0-44.9, adult ([facility] [address] E66.01, Z68.41    Prediabetes R73.03    Anemia D64.9    Decreased fetal movement O36.8190    Back pain M54.9    Swelling of both ankles M25.471, M25.472    Shortness of breath during pregnancy O99.891, R06.02    Decreased fetal movement affecting management of pregnancy in third trimester, fetus 1 S94.9846    Pregnancy induced hypertension, third trimester O13.3    History of pregnancy induced hypertension Z87.59    Orthopnea R06.01    Hypoxemia R09.02    Snoring R06.83    Witnessed episode of apnea R06.81             RECOMMENDATIONS:   CTA chest 2021: No PE. No aortic dissection. Mildly enlarged left axillary lymph node. Recommend outpatient PCP evaluation for left axillary lymph node enlargement as radiologist reported to evaluate/exclude myeloproliferative or neoplastic disorder. No pulmonary vascular congestion or PE on CTA chest.  Patient was diuresed in the morning. Consider using Lasix as needed. She has hypoxemia while sleeping.  reported snoring and witnessed apnea. Discussed with patient/ for outpatient sleep apnea evaluation. Follow-up with me after discharge. Order placed.     SPO2 can drop when patient is lying especially with pregnancy and uterus pushing upward on the diaphragm. SPO2 98% on room air currently. Use O2 to keep SPO2 more than 91%. Monitor for sleep apnea events while patient is sleeping. If required, can use CPAP. Advised to use incentive spirometer frequently. Patient has smoked 1 pack/week intermittently for few years; quit few years ago. Advised not to restart smoking. Prelim echo report with normal LVEF. Recommend to follow official final echo report. Discussed with Dr. Saúl Martin. FiO2 to keep SpO2 >=92%, HOB >=30 degree, aspiration precautions, aggressive pulmonary toileting, incentive spirometry, PT/OT eval and treat, mobilization. DVT Prophylaxis: Defer to primary team.  GI Prophylaxis: Defer to primary team.    Other issues management by primary team and respective consultants. Discussed with patient and family , answered all questions to their satisfaction. Care plan discussed with nursing, GYN, Dr. Saúl Martin. Since there is no acute pulmonary issues, PCCM will sign off. Call us with any questions. Subjective/History:     Ana Paula Hubbard is a 38 y.o. female with PMHx significant for obesity, pregnancy-induced hypertension, gastritis no diabetes, ; admitted for labor induction. Hospitalist was consulted for hypoxemia who consulted cardiologist and pulmonologist.  Patient's  reported loud snoring and witnessed apnea when patient is sleeping. GYN and RN reported SPO2 dropping into 80s when patient is sleeping; requiring 2 LPM O2 while sleeping. When patient is awake, on room air SPO2 98%. 1+ leg edema; received Lasix with good diuresing. CTA chest negative for PE or pulmonary vascular congestion. Denies fever, cough, chest pain, calf pain.         Review of Systems:   HEENT: No epistaxis, no nasal drainage, no difficulty in swallowing, no redness in eyes  Respiratory: as above  Cardiovascular: no chest pain, no palpitations, no syncope  Gastrointestinal: no abd pain, no vomiting, no diarrhea, no bleeding symptoms  Genitourinary: No urinary symptoms or hematuria  Integument/breast: No ulcers or rashes  Musculoskeletal:Neg  Neurological: No focal weakness, no seizures, no headaches  Behvioral/Psych: No anxiety, no depression  Constitutional: No fever, no chills, no weight loss, no night sweats       Immunization status:   Immunization History   Administered Date(s) Administered    Rho(D) Immune Globulin - IM 2019, 2021        Past Medical History:   Diagnosis Date    Anemia     Gestational diabetes     Gestational hypertension     Morbid obesity ([facility] [address]     Morbid obesity with BMI of 40.0-44.9, adult ([facility] [address]     Prediabetes     Pregnancy induced hypertension, third trimester 2021        History reviewed. No pertinent surgical history. History reviewed. No pertinent family history. Social History     Tobacco Use    Smoking status: Former Smoker     Quit date: 2018     Years since quittin.8    Smokeless tobacco: Never Used   Substance Use Topics    Alcohol use: Not Currently        Prior to Admission medications    Medication Sig Start Date End Date Taking? Authorizing Provider   ferrous sulfate 325 mg (65 mg iron) tablet Take  by mouth Daily (before breakfast). Yes Provider, Historical   PNV No12-Iron-FA-DSS-OM-3 29 mg iron-1 mg -50 mg CPKD Take 1 Tab by mouth. Yes Provider, Historical   labetalol (NORMODYNE) 200 mg tablet Take 1 Tab by mouth daily. Indications: high blood pressure 19   Risa Read CNM   ibuprofen (MOTRIN) 800 mg tablet Take 1 Tab by mouth every eight (8) hours as needed for Pain.   Patient not taking: Reported on 2021   Risa Read CNM       Current Facility-Administered Medications   Medication Dose Route Frequency    miSOPROStoL (CYTOTEC) tablet 25 mcg  25 mcg Intravaginal ONCE    lactated Ringers infusion  25 mL/hr IntraVENous CONTINUOUS    sodium chloride (NS) flush 5-40 mL  5-40 mL IntraVENous Q8H    sodium chloride (NS) flush 5-40 mL  5-40 mL IntraVENous PRN    NIFEdipine (PROCARDIA) capsule 20 mg  20 mg Oral TID    lactated ringers bolus infusion 500 mL  500 mL IntraVENous PRN    lactated Ringers infusion  125 mL/hr IntraVENous CONTINUOUS    butorphanol (STADOL) injection 2 mg  2 mg IntraVENous Q2H PRN    nalbuphine (NUBAIN) injection 10 mg  10 mg IntraVENous Q2H PRN    HYDROmorphone (PF) (DILAUDID) injection 1 mg  1 mg IntraVENous Q1H PRN    oxytocin (PITOCIN) 10 unit bolus from bag  10 Units IntraVENous PRN    And    oxytocin (PITOCIN) 30 units/500 ml NS  87.3 khurram-units/min IntraVENous PRN    methylergonovine (METHERGINE) 0.2 mg/mL (1 mL) injection 0.2 mg  0.2 mg IntraMUSCular PRN    terbutaline (BRETHINE) injection 0.25 mg  0.25 mg SubCUTAneous Q20MIN PRN    mineral oil 30 mL  30 mL Topical PRN    lidocaine (PF) (XYLOCAINE) 10 mg/mL (1 %) injection 20 mL  20 mL IntraDERMal PRN    penicillin G pot (PFIZERPEN) 2.5 Million Units in 50 ml 0.9% NaCl  2.5 Million Units IntraVENous Q4H    sodium chloride (NS) flush 5-40 mL  5-40 mL IntraVENous Q8H    sodium chloride (NS) flush 5-40 mL  5-40 mL IntraVENous PRN    fentaNYL 2 mcg/ml with ropivacaine 0.1 % (PF) epidural  1-15 mL/hr Epidural CONTINUOUS    lactated ringers bolus infusion 300 mL  300 mL IntraVENous PRN    naloxone (NARCAN) injection 0.2 mg  0.2 mg IntraVENous PRN    PHENYLephrine (IRIS-SYNEPHRINE) 100 mcg/mL in NS syringe 80 mcg  80 mcg IntraVENous PRN    diphenhydrAMINE (BENADRYL) injection 12.5 mg  12.5 mg IntraVENous Q4H PRN     Facility-Administered Medications Ordered in Other Encounters   Medication Dose Route Frequency    lidocaine (XYLOCAINE) 10 mg/mL (1 %) injection   SubCUTAneous PRN    lidocaine-EPINEPHrine (XYLOCAINE) 1.5 %-1:200,000 injection   Epidural PRN    fentaNYL citrate (PF) injection   Epidural PRN         Objective:   Vital Signs:    Visit Vitals  BP (!) 154/90   Pulse 83   Temp 99.3 °F (37.4 °C)   Resp 22   Ht 6' 1\" (1.854 m)   Wt 156.9 kg (346 lb)   SpO2 94%   BMI 45.65 kg/m²       O2 Device: None (Room air)       Temp (24hrs), Av °F (37.2 °C), Min:98.8 °F (37.1 °C), Max:99.3 °F (37.4 °C)       Intake/Output:   Last shift:      701 - 1900  In: 3935.4 [I.V.:3935.4]  Out: 472 [Urine:795]  Last 3 shifts: 1901 - 700  In: -   Out: 6432 [Urine:3455]    Intake/Output Summary (Last 24 hours) at 2021 1850  Last data filed at 2021 1610  Gross per 24 hour   Intake 3935.42 ml   Output 1295 ml   Net 2640.42 ml       Physical Exam:       General/Neurology: Alert, awake and oriented. NAD. Obese. Head:   NCAT. Eye:   EOM intact. No icterus/pallor/cyanosis. Nose:   No nasal drainage/discharge. Neck:   Trachea midline. Lung: Moderate air entry bilateral equal.  No rales, rhonchi. No wheezing or stridor. No prolonged expiration or accessory muscle use. Heart:   S1 S2 present. No murmur or JVD. Abdomen:  Soft. NT. Pregnant. Extremities:  1+ bilateral pitting edema. No cyanosis or clubbing. Pulses: 2+ and symmetric in DP. Data:       Recent Results (from the past 24 hour(s))   CBC WITH AUTOMATED DIFF    Collection Time: 21  8:45 AM   Result Value Ref Range    WBC 9.1 4.6 - 13.2 K/uL    RBC 3.78 (L) 4.20 - 5.30 M/uL    HGB 9.9 (L) 12.0 - 16.0 g/dL    HCT 31.1 (L) 35.0 - 45.0 %    MCV 82.3 74.0 - 97.0 FL    MCH 26.2 24.0 - 34.0 PG    MCHC 31.8 31.0 - 37.0 g/dL    RDW 14.3 11.6 - 14.5 %    PLATELET 231 657 - 029 K/uL    MPV 11.8 9.2 - 11.8 FL    NEUTROPHILS 79 (H) 40 - 73 %    LYMPHOCYTES 13 (L) 21 - 52 %    MONOCYTES 7 3 - 10 %    EOSINOPHILS 0 0 - 5 %    BASOPHILS 0 0 - 2 %    ABS. NEUTROPHILS 7.2 1.8 - 8.0 K/UL    ABS. LYMPHOCYTES 1.1 0.9 - 3.6 K/UL    ABS. MONOCYTES 0.6 0.05 - 1.2 K/UL    ABS. EOSINOPHILS 0.0 0.0 - 0.4 K/UL    ABS.  BASOPHILS 0.0 0.0 - 0.1 K/UL    DF AUTOMATED     METABOLIC PANEL, COMPREHENSIVE Collection Time: 06/08/21  8:45 AM   Result Value Ref Range    Sodium 137 136 - 145 mmol/L    Potassium 3.9 3.5 - 5.5 mmol/L    Chloride 106 100 - 111 mmol/L    CO2 22 21 - 32 mmol/L    Anion gap 9 3.0 - 18 mmol/L    Glucose 147 (H) 74 - 99 mg/dL    BUN 8 7.0 - 18 MG/DL    Creatinine 0.67 0.6 - 1.3 MG/DL    BUN/Creatinine ratio 12 12 - 20      GFR est AA >60 >60 ml/min/1.73m2    GFR est non-AA >60 >60 ml/min/1.73m2    Calcium 8.8 8.5 - 10.1 MG/DL    Bilirubin, total 0.3 0.2 - 1.0 MG/DL    ALT (SGPT) 15 13 - 56 U/L    AST (SGOT) 14 10 - 38 U/L    Alk.  phosphatase 96 45 - 117 U/L    Protein, total 6.5 6.4 - 8.2 g/dL    Albumin 2.5 (L) 3.4 - 5.0 g/dL    Globulin 4.0 2.0 - 4.0 g/dL    A-G Ratio 0.6 (L) 0.8 - 1.7     FIBRINOGEN    Collection Time: 06/08/21  8:45 AM   Result Value Ref Range    Fibrinogen 563 (H) 210 - 451 mg/dL   CARDIAC PANEL,(CK, CKMB & TROPONIN)    Collection Time: 06/08/21  8:45 AM   Result Value Ref Range    CK - MB <1.0 <3.6 ng/ml    CK-MB Index  0.0 - 4.0 %     CALCULATION NOT PERFORMED WHEN RESULT IS BELOW LINEAR LIMIT    CK 86 26 - 192 U/L    Troponin-I, QT <0.02 0.0 - 0.045 NG/ML   NT-PRO BNP    Collection Time: 06/08/21  8:45 AM   Result Value Ref Range    NT pro-BNP 17 0 - 450 PG/ML   ECHO ADULT COMPLETE    Collection Time: 06/08/21 11:24 AM   Result Value Ref Range    LV E' Septal Velocity 6.00 cm/s    LV E' Lateral Velocity 8.00 cm/s    MV E/A 1.09     E/E' lateral 10.13     E/E' septal 13.50     E/E' ratio (averaged) 11.81     IVSd 1.20 (A) 0.60 - 0.90 cm    LVIDd 5.16 3.90 - 5.30 cm    LVIDs 3.20 cm    LVOT d 2.01 cm    LVPWd 1.26 (A) 0.60 - 0.90 cm    LVOT Cardiac Output 3.79 l/min    LVOT Peak Gradient 2.64 mmHg    Left Ventricular Outflow Tract Mean Gradient 1.28 mmHg    LVOT SV 41.4 ml    LVOT Peak Velocity 81.00 cm/s    LVOT VTI 13.02 cm    RVIDd 5.59 cm    LA Volume 72.69 22.0 - 52.0 ml    LA Vol 2C 73.01 (A) 22.00 - 52.00 ml    LA Vol 4C 55.84 (A) 22.00 - 52.00 ml    Right Atrial Area 4C 23.00 cm2    AV Annulus 3.77 cm    Aortic Valve Area by Continuity of Peak Velocity 1.71 cm2    Aortic Valve Area by Continuity of VTI 1.59 cm2    AoV PG 9.09 mmHg    Aortic Valve Systolic Mean Gradient 3.46 mmHg    Aortic Valve Systolic Peak Velocity 328.38 cm/s    AoV VTI 26.06 cm    MV A Christiano 74.00 cm/s    Mitral Valve E Wave Deceleration Time 270.98 ms    MV E Christiano 81.00 cm/s    Mitral Valve Pressure Half-time 78.58 ms    MVA (PHT) 2.80 cm2    AO ASC D 3.62 cm    LV Mass .4 67.0 - 162.0 g    LV Mass AL Index 93.5 43.0 - 95.0 g/m2    ABUNDIO/BSA Pk Christiano 0.6 cm2/m2    ABUNDIO/BSA VTI 0.6 cm2/m2   GLUCOSE, POC    Collection Time: 06/08/21 11:47 AM   Result Value Ref Range    Glucose (POC) 150 (H) 70 - 110 mg/dL   BLOOD GAS, ARTERIAL POC    Collection Time: 06/08/21 12:22 PM   Result Value Ref Range    Device: NASAL CANNULA      FIO2 (POC) 28 %    pH (POC) 7.41 7.35 - 7.45      pCO2 (POC) 33.0 (L) 35.0 - 45.0 MMHG    pO2 (POC) 98 80 - 100 MMHG    HCO3 (POC) 21.1 (L) 22 - 26 MMOL/L    sO2 (POC) 97.8 (H) 92 - 97 %    Base deficit (POC) 3.0 mmol/L    Allens test (POC) Positive      Site RIGHT RADIAL      Specimen type (POC) ARTERIAL      Performed by Lupe Kan          Chemistry Recent Labs     06/08/21  0845 06/07/21  0535   * 120*    137   K 3.9 4.0    107   CO2 22 24   BUN 8 9   CREA 0.67 0.55*   CA 8.8 8.9   AGAP 9 6   BUCR 12 16   AP 96 91   TP 6.5 6.3*   ALB 2.5* 2.4*   GLOB 4.0 3.9   AGRAT 0.6* 0.6*        Lactic Acid No results found for: LAC  No results for input(s): LAC in the last 72 hours. Liver Enzymes Protein, total   Date Value Ref Range Status   06/08/2021 6.5 6.4 - 8.2 g/dL Final     Albumin   Date Value Ref Range Status   06/08/2021 2.5 (L) 3.4 - 5.0 g/dL Final     Globulin   Date Value Ref Range Status   06/08/2021 4.0 2.0 - 4.0 g/dL Final     A-G Ratio   Date Value Ref Range Status   06/08/2021 0.6 (L) 0.8 - 1.7   Final     Alk.  phosphatase   Date Value Ref Range Status   06/08/2021 96 45 - 117 U/L Final     Recent Labs     06/08/21  0845 06/07/21  0535   TP 6.5 6.3*   ALB 2.5* 2.4*   GLOB 4.0 3.9   AGRAT 0.6* 0.6*   AP 96 91        CBC w/Diff Recent Labs     06/08/21  0845 06/07/21  0535   WBC 9.1 8.2   RBC 3.78* 3.45*   HGB 9.9* 9.1*   HCT 31.1* 28.3*    213   GRANS 79* 76*   LYMPH 13* 16*   EOS 0 1        Cardiac Enzymes Lab Results   Component Value Date/Time    CPK 86 06/08/2021 08:45 AM    CKMB <1.0 06/08/2021 08:45 AM    CKND1  06/08/2021 08:45 AM     CALCULATION NOT PERFORMED WHEN RESULT IS BELOW LINEAR LIMIT    TROIQ <0.02 06/08/2021 08:45 AM        BNP No results found for: BNP, BNPP, XBNPT     Coagulation No results for input(s): PTP, INR, APTT, INREXT, INREXT in the last 72 hours. Thyroid  No results found for: T4, T3U, TSH, TSHEXT, TSHEXT    No results found for: T4     Urinalysis Lab Results   Component Value Date/Time    Color YELLOW 06/07/2021 05:15 AM    Appearance CLEAR 06/07/2021 05:15 AM    Specific gravity 1.007 06/07/2021 05:15 AM    pH (UA) 7.0 06/07/2021 05:15 AM    Protein Negative 06/07/2021 05:15 AM    Glucose Negative 06/07/2021 05:15 AM    Ketone Negative 06/07/2021 05:15 AM    Bilirubin Negative 06/07/2021 05:15 AM    Urobilinogen 1.0 06/07/2021 05:15 AM    Nitrites Negative 06/07/2021 05:15 AM    Leukocyte Esterase Negative 06/07/2021 05:15 AM        Micro  No results for input(s): SDES, CULT in the last 72 hours. No results for input(s): CULT in the last 72 hours. ABG Recent Labs     06/08/21  1222   PHI 7.41   PCO2I 33.0*   PO2I 98   HCO3I 21.1*   FIO2I 28            CT (Most Recent) (CT chest reviewed by me) Results from [place] encounter on 06/07/21    CTA CHEST W OR W WO CONT    Narrative  EXAM: CTA chest    INDICATION: Hypoxia    COMPARISON: None    TECHNIQUE: Axial CT imaging from the thoracic inlet through the diaphragm with  intravenous contrast. Coronal and sagittal MIP reformats were generated.  One or  more dose reduction techniques were used on this CT: automated exposure control,  adjustment of the mAs and/or kVp according to patient size, and iterative  reconstruction techniques. The specific techniques used on this CT exam have  been documented in the patient's electronic medical record. Digital Imaging and  Communications in Medicine (DICOM) format image data are available to  nonaffiliated external healthcare facilities or entities on a secure, media  free, reciprocally searchable basis with patient authorization for at least a  12-month period after this study. _______________    FINDINGS:    EXAM QUALITY: Overall exam quality is satisfactory. Pulmonary arterial  enhancement is adequate with suboptimal breath hold and there is significant  breathing motion artifact. PULMONARY ARTERIES: No evidence of central pulmonary embolism in the main  pulmonary artery or main right and left pulmonary arteries. Evaluation for more  peripheral pulmonary emboli are limited by breathing motion artifacts. LYMPH Nodes: There are mildly enlarged left axillary lymph nodes measuring up to  14 mm in short axis. No definite mediastinal or hilar adenopathy seen. PLEURA: There are no pleural effusion. HEART: Cardiac size is enlarged without pericardial effusion. VASCULATURE/MEDIASTINUM: There is no aortic dissection. LUNGS: No suspicious nodule or mass. No abnormal opacities. AIRWAY: Normal.    UPPER ABDOMEN: Unremarkable. OTHER: No acute or aggressive osseous abnormalities identified. _______________    Impression  No evidence of central pulmonary embolism in the main pulmonary artery or main  right and left pulmonary arteries. Evaluation for more peripheral pulmonary  emboli are limited by breathing motion artifacts. There is no aortic dissection. Mildly enlarged left axillary lymph node. Follow-up to exclude any  myeloproliferative or neoplastic disorder. XR (Most Recent).  CXR reviewed by me and compared with previous CXR Results from Hospital Encounter encounter on 06/07/21    XR CHEST PORT    Narrative  EXAM:  PORTABLE CHEST    INDICATION:  Orthopnea. Edema. TECHNIQUE:  Portable, AP view. COMPARISON:  05/16/2019    ____________________    FINDINGS:    SUPPORT DEVICES: None. HEART AND MEDIASTINUM: Cardiac silhouette is mildly enlarged, however may be  accentuated due to portable technique and hypoinflation. Normal caliber  thoracic aorta. LUNGS AND PLEURAL SPACES: Lungs are moderately hypoinflated. Prominence of the  pulmonary vasculature. No pneumothorax or significant pleural effusion. BONY THORAX AND SOFT TISSUES: No acute osseous abnormality. ____________________    Impression  Interval decrease in lung volumes with moderate hypoinflation. Prominence of the pulmonary vasculature, may be accentuated secondary to  hypoinflation, however correlate clinically to exclude developing mild pulmonary  edema. **   *    *         ·Please note: Voice-recognition software may have been used to generate this report, which may have resulted in some phonetic-based errors in grammar and contents. Even though attempts were made to correct all the mistakes, some may have been missed, and remained in the body of the document.     Jazmine Fulton MD  6/8/2021

## 2021-06-08 NOTE — PROGRESS NOTES
0759 Bedside and Verbal shift change report given to [facility], RN (oncoming nurse) by MADELINE Nolasco RN (offgoing nurse). Report included the following information SBAR, Kardex, Intake/Output, MAR and Recent Results. Hospitalist at bedside discussing plan of care. Received orders to start 2L oxygen to keep maternal oxygen saturation above 90%. Hospitalist placing orders for pulmonary and cardiology consult. Echo to be ordered. Patient in bed with  at bedside. Call bell within reach. Will continue monitoring. 3316 Dr. Brock Mariano called unit and updated on orders from hospitalist. Received orders to not restart pitocin and to give 25 mcg of vaginal Cytotec. Patient eating breakfast. Will place Cytotec once she is finished. 1038 Technician at bedside for echo. 1128 Respiratory notified of ABG order. 1140 Becker placed. 1203 Respiratory at bedside for ABG.    1229 Dr. Brock Mariano at bedside discussing plan of care. 26 Dr. Maria Luisa Fernandez called unit and gave update on cardiac clearance, will order CTA of chest, and lasix to be given post CTA. [address] Dr. Maria Luisa Fernandez paged. 18 Dr. Maria Luisa Fernandez called unit and will place order for CTA    1458 Dr. Maria Luisa Fernandez called unit and wants know what is the lowest amount of IV fluid the patient can be on. Will call Dr. Brock Mariano to speak with him about lowering fluid. 1125 [facility] and spoke with Dr. Brock Mariano about IV fluid rate. Rate can not be lowered any further than 125 mL/hr. Dr. Brock Mariano will call and speak with Dr. Maria Luisa Fernandez. 36 CT called and is ready for patient and wants to know if the patient has been educated on risks of radiation in pregnancy. 80 Dr. Brock Mariano call transferred into room for education on CT.    1625 IV initiated for CT.    1701 Patient taken off unit for CT scan    1713 Pulmonologist and cardiologist on unit talking to Dr. Brock Mariano. 1718 Patient returned to room for CT scan.      [address] [facility] Dr. Brock Mariano, pulmonologist, and cardiologist at bedside discussing plan of care. To continue with oxygen of 2L. Waiting for results of CT scan. 1729 Vertex presentation confirmed by bedside US.    1731 SVE 3-4 cm    1735 EFM and TOCO applied. 1748 K. Blank Putnam RN at bedside adjusting EFM    1903 IV in left hand removed due to pain at patients request.    1915 Bedside and Verbal shift change report given to Twan Foote RN (oncoming nurse) by Lupillo Warner RN (offgoing nurse). Report included the following information SBAR, Kardex, Intake/Output, MAR and Recent Results.

## 2021-06-08 NOTE — CONSULTS
Medicine Consult    Patient:  Bela Padron 38 y.o. female  Asked to evaluate patient by Dr. Cora Gil  Primary Care Provider:  Other, MD Kim  Date of Admission:  2021  Reason for Consult: Acute respiratory distress with hypoxia associated with severe sleep apnea. Assessment/Plan   38 y.o. Norberto Garcia  female admitted with gestational diabetes with severe SOB, paroxysmal nocturnal dyspnea and bilateral ankle edema and inpatient medicine team was consulted for assistance. Acute respiratory failure with hypoxia likely due to severe sleep apnea   Paroxysmal nocturnal dyspnea   BLE edema   Intermittent tachycardia   H/o gestational diabetes   Pregnancy induced hypertension   Morbid obesity     Most of respiratory/pulmonary effects that were seen on likely related to severe sleep apnea that has been worsened by being in third trimester of patient's pregnancy   Will add oxygen by nasal cannula to keep O2 sats from dropping so precipitously when she is asleep  Check an arterial blood gas now     Concerned about possible cardiac effects as patient is now retaining fluid which seems to be above and beyond what we would be in a normal third trimester pregnancy, chest x-ray showed possible early pulmonary edema, patient has bilateral lower extremity edema, intermittent tachycardia and PND. She is also having a recurrence of her pregnancy-induced hypertension which OB/GYN team is managing with nifedipine. This morning I have obtained stat pro BNP, cardiac enzymes and an echocardiogram.     Discussed overall case with Dr. Cora Gil. He is holding Pitocin induction for now to await results of above evaluation to determine if patient is still appropriate to remain hospitalized here or needs to go to the hospital with a higher level of care vs. discontinuing induction and taking patient to  more immediately. Will ask cardiology to see as well.      Patient will need to follow-up with pulmonology to get an official diagnosis of obstructive sleep apnea and complete outpatient work-up. Thank you for allowing us to participate in this patients care. Please note that this dictation was completed with Intrax[employer], the computer voice recognition software. Quite often unanticipated grammatical, syntax, homophones, and other interpretive errors are inadvertently transcribed by the computer software. Please disregard these errors. Please excuse any errors that have escaped final proofreading. Thank you. Patient Active Problem List   Diagnosis Code    Gestational diabetes O23.80    Perineal laceration, first degree, delivered O70.0    Anemia during pregnancy O99.019    Morbid obesity ([address] E66.01    Morbid obesity with BMI of 40.0-44.9, adult ([address] E66.01, Z68.41    Prediabetes R73.03    Anemia D64.9    Decreased fetal movement O36.8190    Back pain M54.9    Swelling of both ankles M25.471, M25.472    Shortness of breath during pregnancy O99.891, R06.02    Decreased fetal movement affecting management of pregnancy in third trimester, fetus 1 A98.2287    Pregnancy induced hypertension, third trimester O13.3    History of pregnancy induced hypertension Z87.59    Orthopnea R06.01     HPI:   CC:  Noemi Vanegas is a 38 y.o. female [address]  female admitted with gestational diabetes with severe SOB, paroxysmal nocturnal dyspnea and bilateral ankle edema. Patient's  who is at the bedside noted the patient snored very loudly before pregnant with this pregnancy but since she has been pregnant with this pregnancy he has noticed that she not only snores but she stops breathing and he has to wake her up all night long. He says that she cannot lay flat she sleeps propped up on pillows all night long. The OB team attempted to do a Becker bulb insertion to ripen patient cervix but was unsuccessful due to the past fact the patient cannot lie flat.   During his hospitalization patient falls asleep multiple times even while you are talking to her and her oxygen desats as low as the mid 80s. Yesterday, apparently medicine consult was attempted by the off going OB/GYN team but communication was unsuccessful and the inpatient hospitalist never received the consult. Inpatient medicine team was re-consulted this morning at 6:45 AM, the overnight nocturnist received the consult and passed it on to the day hospitalist today. Yesterday the OB team gave patient Lasix 10 mg IV with some improvement. Overnight patient was started on Pitocin. Past Medical History:   Diagnosis Date    Anemia     Gestational diabetes     Gestational hypertension     Morbid obesity ([address]     Morbid obesity with BMI of 40.0-44.9, adult ([facility]     Prediabetes     Pregnancy induced hypertension, third trimester 2021     History reviewed. No pertinent surgical history. Social History     Tobacco Use    Smoking status: Former Smoker     Quit date: 2018     Years since quittin.8    Smokeless tobacco: Never Used   Substance Use Topics    Alcohol use: Not Currently    Drug use: Never     History reviewed. No pertinent family history. No current facility-administered medications on file prior to encounter. Current Outpatient Medications on File Prior to Encounter   Medication Sig Dispense Refill    ferrous sulfate 325 mg (65 mg iron) tablet Take  by mouth Daily (before breakfast).  PNV No12-Iron-FA-DSS-OM-3 29 mg iron-1 mg -50 mg CPKD Take 1 Tab by mouth.  labetalol (NORMODYNE) 200 mg tablet Take 1 Tab by mouth daily. Indications: high blood pressure 30 Tab 0    ibuprofen (MOTRIN) 800 mg tablet Take 1 Tab by mouth every eight (8) hours as needed for Pain.  (Patient not taking: Reported on 2021) 60 Tab 1      No Known Allergies        Review of Systems  Constitutional: No fever, chills, diaphoresis, malaise, fatigue or weight gain/loss or falls  Skin: no itching or rashes  HEENT: no ear discomfort, hearing loss, tinnitus, epistaxis or sore throat  EYES: no blurry vision, double vision or photophobia  CARDIOVASCULAR: no claudication, cp, palpitations, orthopnea, pnd or LE edema  PULMONARY: no cough, wheeze, shortness of breath or sputum production  GI: no nausea, vomiting, diarrhea, abdominal pain, melena, hematemesis or brbpr  : no dysuria, hematuria  MUSCULOSKELETAL: no back pain, joint pain or myalgias  ENDOCRINE: no heat/cold intolerance, polyuria or polydipsia  HEME: no easy bruising or bleeding  NEURO: no unilateral weakness, numbness, tingling or seizures  Physical Exam:      Visit Vitals  BP (!) 182/139   Pulse 71   Temp 98.8 °F (37.1 °C)   Resp 18   Ht 6' 1\" (1.854 m)   Wt 156.9 kg (346 lb)   SpO2 95%   BMI 45.65 kg/m²     Body mass index is 45.65 kg/m².     Physical Exam:  GEN: Obviously pregnant overweight female in no clear distress, frequently fell asleep while talking and oxygen dropped whenever she would go to sleep  HEENT: atraumatic, nose normal,oropharynx clear, MMM  NECK: supple, trachea midline, no supraclavicular or submandibular adenopathy noted  EYES: conjuctiva normal, lids with out lesions, PERRL  HEART: RRR with out m/r/g, pmi nondisplaced, pulses 2+ distally  LUNGS: equal chest wall expansion, cta bl with out wheezes/rales or rhonchi  AB: soft, gravid abdomen consistent with third trimester  NEURO: alert, awake and oriented x3, gait not assessed, cranial nerves intact, strength 5/5 bl UE and LE, sensation intact, reflexes nonpathological  SKIN: 2+ pitting edema bilateral lower extremities    Laboratory Studies:    Recent Results (from the past 24 hour(s))   RPR    Collection Time: 06/07/21 10:52 AM   Result Value Ref Range    RPR NONREACTIVE NR     GLUCOSE, POC    Collection Time: 06/07/21  3:30 PM   Result Value Ref Range    Glucose (POC) 83 70 - 110 mg/dL

## 2021-06-09 LAB
ALBUMIN SERPL-MCNC: 2.3 G/DL (ref 3.4–5)
ALBUMIN/GLOB SERPL: 0.6 {RATIO} (ref 0.8–1.7)
ALP SERPL-CCNC: 86 U/L (ref 45–117)
ALT SERPL-CCNC: 14 U/L (ref 13–56)
ANION GAP SERPL CALC-SCNC: 9 MMOL/L (ref 3–18)
AST SERPL-CCNC: 16 U/L (ref 10–38)
BASOPHILS # BLD: 0 K/UL (ref 0–0.1)
BASOPHILS NFR BLD: 0 % (ref 0–2)
BILIRUB SERPL-MCNC: 0.3 MG/DL (ref 0.2–1)
BUN SERPL-MCNC: 7 MG/DL (ref 7–18)
BUN/CREAT SERPL: 17 (ref 12–20)
CALCIUM SERPL-MCNC: 8.6 MG/DL (ref 8.5–10.1)
CHLORIDE SERPL-SCNC: 109 MMOL/L (ref 100–111)
CO2 SERPL-SCNC: 23 MMOL/L (ref 21–32)
CREAT SERPL-MCNC: 0.42 MG/DL (ref 0.6–1.3)
DIFFERENTIAL METHOD BLD: ABNORMAL
EOSINOPHIL # BLD: 0.1 K/UL (ref 0–0.4)
EOSINOPHIL NFR BLD: 1 % (ref 0–5)
ERYTHROCYTE [DISTWIDTH] IN BLOOD BY AUTOMATED COUNT: 14.3 % (ref 11.6–14.5)
GLOBULIN SER CALC-MCNC: 3.9 G/DL (ref 2–4)
GLUCOSE BLD STRIP.AUTO-MCNC: 117 MG/DL (ref 70–110)
GLUCOSE SERPL-MCNC: 106 MG/DL (ref 74–99)
HCT VFR BLD AUTO: 29.9 % (ref 35–45)
HGB BLD-MCNC: 9.2 G/DL (ref 12–16)
HISTORY CHECKED?,CKHIST: NORMAL
LYMPHOCYTES # BLD: 1.3 K/UL (ref 0.9–3.6)
LYMPHOCYTES NFR BLD: 15 % (ref 21–52)
MCH RBC QN AUTO: 25.3 PG (ref 24–34)
MCHC RBC AUTO-ENTMCNC: 30.8 G/DL (ref 31–37)
MCV RBC AUTO: 82.4 FL (ref 74–97)
MONOCYTES # BLD: 0.5 K/UL (ref 0.05–1.2)
MONOCYTES NFR BLD: 6 % (ref 3–10)
NEUTS SEG # BLD: 6.7 K/UL (ref 1.8–8)
NEUTS SEG NFR BLD: 77 % (ref 40–73)
PLATELET # BLD AUTO: 219 K/UL (ref 135–420)
PMV BLD AUTO: 11.8 FL (ref 9.2–11.8)
POTASSIUM SERPL-SCNC: 4.1 MMOL/L (ref 3.5–5.5)
PROT SERPL-MCNC: 6.2 G/DL (ref 6.4–8.2)
RBC # BLD AUTO: 3.63 M/UL (ref 4.2–5.3)
SODIUM SERPL-SCNC: 141 MMOL/L (ref 136–145)
URATE SERPL-MCNC: 4.7 MG/DL (ref 2.6–7.2)
WBC # BLD AUTO: 8.7 K/UL (ref 4.6–13.2)

## 2021-06-09 PROCEDURE — 77030040361 HC SLV COMPR DVT MDII -B

## 2021-06-09 PROCEDURE — 86901 BLOOD TYPING SEROLOGIC RH(D): CPT

## 2021-06-09 PROCEDURE — 76060000033 HC ANESTHESIA 1 TO 1.5 HR: Performed by: OBSTETRICS & GYNECOLOGY

## 2021-06-09 PROCEDURE — 76010000392 HC C SECN EA ADDL 0.5 HR: Performed by: OBSTETRICS & GYNECOLOGY

## 2021-06-09 PROCEDURE — 80053 COMPREHEN METABOLIC PANEL: CPT

## 2021-06-09 PROCEDURE — 77030040361 HC SLV COMPR DVT MDII -B: Performed by: OBSTETRICS & GYNECOLOGY

## 2021-06-09 PROCEDURE — 86923 COMPATIBILITY TEST ELECTRIC: CPT

## 2021-06-09 PROCEDURE — 84550 ASSAY OF BLOOD/URIC ACID: CPT

## 2021-06-09 PROCEDURE — 74011000250 HC RX REV CODE- 250: Performed by: ANESTHESIOLOGY

## 2021-06-09 PROCEDURE — 85025 COMPLETE CBC W/AUTO DIFF WBC: CPT

## 2021-06-09 PROCEDURE — 76010000391 HC C SECN FIRST 1 HR: Performed by: OBSTETRICS & GYNECOLOGY

## 2021-06-09 PROCEDURE — 77010026065 HC OXYGEN MINIMUM MEDICAL AIR

## 2021-06-09 PROCEDURE — 82962 GLUCOSE BLOOD TEST: CPT

## 2021-06-09 PROCEDURE — 36415 COLL VENOUS BLD VENIPUNCTURE: CPT

## 2021-06-09 PROCEDURE — 74011250637 HC RX REV CODE- 250/637: Performed by: OBSTETRICS & GYNECOLOGY

## 2021-06-09 PROCEDURE — 74011250636 HC RX REV CODE- 250/636: Performed by: OBSTETRICS & GYNECOLOGY

## 2021-06-09 PROCEDURE — 74011250636 HC RX REV CODE- 250/636: Performed by: ANESTHESIOLOGY

## 2021-06-09 PROCEDURE — 75410000003 HC RECOV DEL/VAG/CSECN EA 0.5 HR: Performed by: OBSTETRICS & GYNECOLOGY

## 2021-06-09 PROCEDURE — 74011250637 HC RX REV CODE- 250/637

## 2021-06-09 PROCEDURE — 65270000029 HC RM PRIVATE

## 2021-06-09 PROCEDURE — 75410000003 HC RECOV DEL/VAG/CSECN EA 0.5 HR

## 2021-06-09 PROCEDURE — 74011000258 HC RX REV CODE- 258: Performed by: OBSTETRICS & GYNECOLOGY

## 2021-06-09 PROCEDURE — 74011000258 HC RX REV CODE- 258: Performed by: ANESTHESIOLOGY

## 2021-06-09 PROCEDURE — 77030031139 HC SUT VCRL2 J&J -A: Performed by: OBSTETRICS & GYNECOLOGY

## 2021-06-09 PROCEDURE — 77030008459 HC STPLR SKN COOP -B: Performed by: OBSTETRICS & GYNECOLOGY

## 2021-06-09 PROCEDURE — 77030008462 HC STPLR SKN PROX J&J -A: Performed by: OBSTETRICS & GYNECOLOGY

## 2021-06-09 PROCEDURE — 2709999900 HC NON-CHARGEABLE SUPPLY: Performed by: OBSTETRICS & GYNECOLOGY

## 2021-06-09 RX ORDER — MIDAZOLAM HYDROCHLORIDE 1 MG/ML
INJECTION, SOLUTION INTRAMUSCULAR; INTRAVENOUS AS NEEDED
Status: DISCONTINUED | OUTPATIENT
Start: 2021-06-09 | End: 2021-06-09 | Stop reason: HOSPADM

## 2021-06-09 RX ORDER — CEFAZOLIN SODIUM 1 G/3ML
INJECTION, POWDER, FOR SOLUTION INTRAMUSCULAR; INTRAVENOUS AS NEEDED
Status: DISCONTINUED | OUTPATIENT
Start: 2021-06-09 | End: 2021-06-09 | Stop reason: HOSPADM

## 2021-06-09 RX ORDER — ONDANSETRON 2 MG/ML
INJECTION INTRAMUSCULAR; INTRAVENOUS AS NEEDED
Status: DISCONTINUED | OUTPATIENT
Start: 2021-06-09 | End: 2021-06-09 | Stop reason: HOSPADM

## 2021-06-09 RX ORDER — SODIUM CHLORIDE, SODIUM LACTATE, POTASSIUM CHLORIDE, CALCIUM CHLORIDE 600; 310; 30; 20 MG/100ML; MG/100ML; MG/100ML; MG/100ML
125 INJECTION, SOLUTION INTRAVENOUS CONTINUOUS
Status: DISPENSED | OUTPATIENT
Start: 2021-06-09 | End: 2021-06-10

## 2021-06-09 RX ORDER — IBUPROFEN 400 MG/1
800 TABLET ORAL
Status: DISCONTINUED | OUTPATIENT
Start: 2021-06-12 | End: 2021-06-10

## 2021-06-09 RX ORDER — KETOROLAC TROMETHAMINE 30 MG/ML
30 INJECTION, SOLUTION INTRAMUSCULAR; INTRAVENOUS EVERY 6 HOURS
Status: DISCONTINUED | OUTPATIENT
Start: 2021-06-09 | End: 2021-06-10

## 2021-06-09 RX ORDER — KETOROLAC TROMETHAMINE 15 MG/ML
INJECTION, SOLUTION INTRAMUSCULAR; INTRAVENOUS AS NEEDED
Status: DISCONTINUED | OUTPATIENT
Start: 2021-06-09 | End: 2021-06-09 | Stop reason: HOSPADM

## 2021-06-09 RX ORDER — DIPHENHYDRAMINE HCL 25 MG
25 CAPSULE ORAL
Status: DISPENSED | OUTPATIENT
Start: 2021-06-09 | End: 2021-06-10

## 2021-06-09 RX ORDER — SODIUM CHLORIDE 0.9 % (FLUSH) 0.9 %
5-40 SYRINGE (ML) INJECTION EVERY 8 HOURS
Status: DISCONTINUED | OUTPATIENT
Start: 2021-06-09 | End: 2021-06-11 | Stop reason: HOSPADM

## 2021-06-09 RX ORDER — CEFAZOLIN SODIUM/WATER 2 G/20 ML
SYRINGE (ML) INTRAVENOUS
Status: DISPENSED
Start: 2021-06-09 | End: 2021-06-09

## 2021-06-09 RX ORDER — OXYTOCIN/0.9 % SODIUM CHLORIDE 30/500 ML
87.3 PLASTIC BAG, INJECTION (ML) INTRAVENOUS AS NEEDED
Status: DISCONTINUED | OUTPATIENT
Start: 2021-06-09 | End: 2021-06-11 | Stop reason: HOSPADM

## 2021-06-09 RX ORDER — NALOXONE HYDROCHLORIDE 0.4 MG/ML
0.4 INJECTION, SOLUTION INTRAMUSCULAR; INTRAVENOUS; SUBCUTANEOUS
Status: ACTIVE | OUTPATIENT
Start: 2021-06-09 | End: 2021-06-10

## 2021-06-09 RX ORDER — NIFEDIPINE 10 MG/1
CAPSULE ORAL
Status: COMPLETED
Start: 2021-06-09 | End: 2021-06-09

## 2021-06-09 RX ORDER — MORPHINE SULFATE 1 MG/ML
INJECTION, SOLUTION EPIDURAL; INTRATHECAL; INTRAVENOUS AS NEEDED
Status: DISCONTINUED | OUTPATIENT
Start: 2021-06-09 | End: 2021-06-09 | Stop reason: HOSPADM

## 2021-06-09 RX ORDER — KETOROLAC TROMETHAMINE 30 MG/ML
30 INJECTION, SOLUTION INTRAMUSCULAR; INTRAVENOUS
Status: DISCONTINUED | OUTPATIENT
Start: 2021-06-09 | End: 2021-06-10

## 2021-06-09 RX ORDER — OXYTOCIN/RINGER'S LACTATE 30/500 ML
10 PLASTIC BAG, INJECTION (ML) INTRAVENOUS AS NEEDED
Status: DISCONTINUED | OUTPATIENT
Start: 2021-06-09 | End: 2021-06-11 | Stop reason: HOSPADM

## 2021-06-09 RX ORDER — OXYCODONE HYDROCHLORIDE 5 MG/1
10 TABLET ORAL
Status: ACTIVE | OUTPATIENT
Start: 2021-06-09 | End: 2021-06-10

## 2021-06-09 RX ORDER — LIDOCAINE HYDROCHLORIDE AND EPINEPHRINE 20; 5 MG/ML; UG/ML
INJECTION, SOLUTION EPIDURAL; INFILTRATION; INTRACAUDAL; PERINEURAL AS NEEDED
Status: DISCONTINUED | OUTPATIENT
Start: 2021-06-09 | End: 2021-06-09 | Stop reason: HOSPADM

## 2021-06-09 RX ORDER — FACIAL-BODY WIPES
10 EACH TOPICAL DAILY PRN
Status: DISCONTINUED | OUTPATIENT
Start: 2021-06-09 | End: 2021-06-11 | Stop reason: HOSPADM

## 2021-06-09 RX ORDER — CEFAZOLIN SODIUM 1 G/3ML
INJECTION, POWDER, FOR SOLUTION INTRAMUSCULAR; INTRAVENOUS
Status: COMPLETED
Start: 2021-06-09 | End: 2021-06-09

## 2021-06-09 RX ORDER — NALBUPHINE HYDROCHLORIDE 10 MG/ML
5 INJECTION, SOLUTION INTRAMUSCULAR; INTRAVENOUS; SUBCUTANEOUS
Status: DISCONTINUED | OUTPATIENT
Start: 2021-06-09 | End: 2021-06-11 | Stop reason: HOSPADM

## 2021-06-09 RX ORDER — PROMETHAZINE HYDROCHLORIDE 25 MG/ML
25 INJECTION, SOLUTION INTRAMUSCULAR; INTRAVENOUS
Status: DISCONTINUED | OUTPATIENT
Start: 2021-06-09 | End: 2021-06-11 | Stop reason: HOSPADM

## 2021-06-09 RX ORDER — OXYCODONE AND ACETAMINOPHEN 5; 325 MG/1; MG/1
1-2 TABLET ORAL
Status: DISCONTINUED | OUTPATIENT
Start: 2021-06-09 | End: 2021-06-11 | Stop reason: HOSPADM

## 2021-06-09 RX ORDER — SIMETHICONE 80 MG
80 TABLET,CHEWABLE ORAL
Status: DISCONTINUED | OUTPATIENT
Start: 2021-06-09 | End: 2021-06-11 | Stop reason: HOSPADM

## 2021-06-09 RX ORDER — SODIUM CHLORIDE 9 MG/ML
250 INJECTION, SOLUTION INTRAVENOUS AS NEEDED
Status: DISCONTINUED | OUTPATIENT
Start: 2021-06-09 | End: 2021-06-11 | Stop reason: HOSPADM

## 2021-06-09 RX ORDER — SODIUM CHLORIDE 0.9 % (FLUSH) 0.9 %
5-40 SYRINGE (ML) INJECTION AS NEEDED
Status: DISCONTINUED | OUTPATIENT
Start: 2021-06-09 | End: 2021-06-11 | Stop reason: HOSPADM

## 2021-06-09 RX ORDER — ONDANSETRON 2 MG/ML
4 INJECTION INTRAMUSCULAR; INTRAVENOUS
Status: ACTIVE | OUTPATIENT
Start: 2021-06-09 | End: 2021-06-10

## 2021-06-09 RX ORDER — KETOROLAC TROMETHAMINE 30 MG/ML
INJECTION, SOLUTION INTRAMUSCULAR; INTRAVENOUS
Status: DISPENSED
Start: 2021-06-09 | End: 2021-06-10

## 2021-06-09 RX ORDER — ACETAMINOPHEN 325 MG/1
650 TABLET ORAL
Status: DISCONTINUED | OUTPATIENT
Start: 2021-06-09 | End: 2021-06-11 | Stop reason: HOSPADM

## 2021-06-09 RX ORDER — ZOLPIDEM TARTRATE 5 MG/1
5 TABLET ORAL
Status: DISCONTINUED | OUTPATIENT
Start: 2021-06-09 | End: 2021-06-11 | Stop reason: HOSPADM

## 2021-06-09 RX ORDER — NIFEDIPINE 30 MG/1
60 TABLET, EXTENDED RELEASE ORAL DAILY
Status: DISCONTINUED | OUTPATIENT
Start: 2021-06-09 | End: 2021-06-11 | Stop reason: HOSPADM

## 2021-06-09 RX ADMIN — LIDOCAINE HYDROCHLORIDE,EPINEPHRINE BITARTRATE 5 ML: 20; .005 INJECTION, SOLUTION EPIDURAL; INFILTRATION; INTRACAUDAL; PERINEURAL at 07:26

## 2021-06-09 RX ADMIN — KETOROLAC TROMETHAMINE 30 MG: 30 INJECTION, SOLUTION INTRAMUSCULAR; INTRAVENOUS at 18:10

## 2021-06-09 RX ADMIN — LIDOCAINE HYDROCHLORIDE,EPINEPHRINE BITARTRATE 3 ML: 15; .005 INJECTION, SOLUTION EPIDURAL; INFILTRATION; INTRACAUDAL; PERINEURAL at 01:31

## 2021-06-09 RX ADMIN — LIDOCAINE HYDROCHLORIDE,EPINEPHRINE BITARTRATE 5 ML: 20; .005 INJECTION, SOLUTION EPIDURAL; INFILTRATION; INTRACAUDAL; PERINEURAL at 07:24

## 2021-06-09 RX ADMIN — NIFEDIPINE 20 MG: 10 CAPSULE ORAL at 01:12

## 2021-06-09 RX ADMIN — LIDOCAINE HYDROCHLORIDE,EPINEPHRINE BITARTRATE 5 ML: 20; .005 INJECTION, SOLUTION EPIDURAL; INFILTRATION; INTRACAUDAL; PERINEURAL at 07:33

## 2021-06-09 RX ADMIN — MORPHINE SULFATE 3 MG: 1 INJECTION, SOLUTION EPIDURAL; INTRATHECAL; INTRAVENOUS at 08:21

## 2021-06-09 RX ADMIN — CEFAZOLIN 1 G: 1 INJECTION, POWDER, FOR SOLUTION INTRAMUSCULAR; INTRAVENOUS at 07:49

## 2021-06-09 RX ADMIN — MIDAZOLAM 1 MG: 1 INJECTION INTRAMUSCULAR; INTRAVENOUS at 08:10

## 2021-06-09 RX ADMIN — KETOROLAC TROMETHAMINE 30 MG: 15 INJECTION, SOLUTION INTRAMUSCULAR; INTRAVENOUS at 08:24

## 2021-06-09 RX ADMIN — SODIUM CHLORIDE 2.5 MILLION UNITS: 9 INJECTION, SOLUTION INTRAVENOUS at 05:16

## 2021-06-09 RX ADMIN — SODIUM CHLORIDE, SODIUM LACTATE, POTASSIUM CHLORIDE, AND CALCIUM CHLORIDE 125 ML/HR: 600; 310; 30; 20 INJECTION, SOLUTION INTRAVENOUS at 06:36

## 2021-06-09 RX ADMIN — NIFEDIPINE 60 MG: 30 TABLET, FILM COATED, EXTENDED RELEASE ORAL at 10:07

## 2021-06-09 RX ADMIN — MIDAZOLAM 2 MG: 1 INJECTION INTRAMUSCULAR; INTRAVENOUS at 08:04

## 2021-06-09 RX ADMIN — LIDOCAINE HYDROCHLORIDE,EPINEPHRINE BITARTRATE 5 ML: 20; .005 INJECTION, SOLUTION EPIDURAL; INFILTRATION; INTRACAUDAL; PERINEURAL at 07:29

## 2021-06-09 RX ADMIN — DIPHENHYDRAMINE HYDROCHLORIDE 12.5 MG: 50 INJECTION, SOLUTION INTRAMUSCULAR; INTRAVENOUS at 12:03

## 2021-06-09 RX ADMIN — MIDAZOLAM 1 MG: 1 INJECTION INTRAMUSCULAR; INTRAVENOUS at 08:08

## 2021-06-09 RX ADMIN — FENTANYL CITRATE 12 ML/HR: 0.05 INJECTION, SOLUTION INTRAMUSCULAR; INTRAVENOUS at 01:36

## 2021-06-09 RX ADMIN — CEFAZOLIN 2 G: 1 INJECTION, POWDER, FOR SOLUTION INTRAMUSCULAR; INTRAVENOUS at 07:44

## 2021-06-09 RX ADMIN — SODIUM CHLORIDE, SODIUM LACTATE, POTASSIUM CHLORIDE, AND CALCIUM CHLORIDE: 600; 310; 30; 20 INJECTION, SOLUTION INTRAVENOUS at 08:30

## 2021-06-09 RX ADMIN — SODIUM CHLORIDE, SODIUM LACTATE, POTASSIUM CHLORIDE, AND CALCIUM CHLORIDE: 600; 310; 30; 20 INJECTION, SOLUTION INTRAVENOUS at 07:39

## 2021-06-09 RX ADMIN — ONDANSETRON HYDROCHLORIDE 4 MG: 2 INJECTION INTRAMUSCULAR; INTRAVENOUS at 07:54

## 2021-06-09 NOTE — OP NOTES
Section Delivery Procedure Note    Name: Major Moreno   Medical Record Number: [id number]   YOB: 1980  Today's Date: 2021      Preoperative Diagnosis: pih/sleep apnea/failed induction    Postoperative Diagnosis: same    Procedure: Low Cervical Transverse Procedure(s):   SECTION    Surgeon(s):  Aram Rodriguez MD    Assistants: Taylor Mcbride Tech-1: Elton Lai Tasks:   Retraction and Closing        Anesthesia: Epidural    Prophylactic Antibiotics: Ancef pre-op Ancef pre-delivery 1 doses. Procedure Details:    Patient was induced under spinal anesthetic, placed supine, pillow under right hip, scrubbed and draped. She was checked for adequate anesthesia. Pfannenstiel incision was made in the skin, extended through the subcutaneous tissue and the anterior rectus sheath. The rectus muscles were  and a longitudinal incision was made in the parietal peritoneum preserving the bladder. Bladder blade was positioned. Transverse incision is made in the lower segment of the uterus after first carefully locating the position of the lower segment. Baby is delivered through this incision. No traction is placed upon the fetal head during this delivery. cord is clamped and cut. Baby is handed off to waiting pediatric expert, after initial resuscitation with bulb suction while we waited for the cord to be clamped and cut. Placenta is now delivered manually, and the uterus was delivered out of the pelvic cavity  Uterine cavity is carefully cleaned with a dry pack and inspected to be sure that it is empty. a submucous myoma was palpable right fundal 5-6cm  Myometrium is closed in two layers the first running locking and the second running imbricating. Once we were sure that hemostasis had been achieved, the uterus was returned to the peritoneal cavity. Careful hemostasis is achieved in the rectus sheath, and this is closed with a running suture.   Careful hemostasis is obtained with cautery in the subcutaneous tissue and the skin is closed with subcuticular stitch. Sterile dressing is applied, patient is recovered and sent to recovery room in good condition. .  Note: all sutures used throughout were number 1 Vicryl.         Estimated Blood Loss: 800cc  Replacement: 1000cclr    Fetal Description: horton female    Apgar - One Minute: 8    Apgar - Five Minutes: 9    Umbilical Cord: 3 vessels present             Cord Blood Results:   Information for the patient's :  Aidan Fails [[id number]]   No results found for: PCTABR, [address], PCTDIG, BILI, ABORH, ABORHEXT          Birth Information:   Information for the patient's :  Aidan Fails [[id number]]          Specimens: none        Maternal Findings    Uterus Size: normal, Fibroids: yes , Adhesions: {None, Anomalies: None Defects: no   Tubes normal   Ovaries normal   Abdomen Adhesions: None                Complications:  none             Mother's Condition: good  Baby's Condition: good    Signed: Tarah Aguilar MD      2021

## 2021-06-09 NOTE — PROGRESS NOTES
Talked to [facility]  Too    Would accept as transfer, but they have had no beds for 2days and are still full. Depending on EFW, would rec CS or induction. If > 10lb, induction. [address] but they have not returned call    By exam: 10 lb  By [facility] US 4200 gm    All these measurements are approx, given size and US /exam limitations. Satisfactory to pit for 12hrs and then Cs if no progress. Disc with patient and she agrees. Currently, orthopnea much reduced. O2 sats are good. Cardiac Echo and chest CT were essentially normal, as far as current situation is concerned. Early or pre toxemia, CHRISTA Rose

## 2021-06-09 NOTE — LACTATION NOTE
This note was copied from a baby's chart. 1744 per mom, has  \"several different times\". Discussed supply and demand due to supplementation with formula and breastfeeding goals. Mom stated she has been giving between 10-30 ml of formula. Mom asking about pumping, LC explained the reasons for not setting up a breast pump at this time. Mom verbalized understanding. Will remain available.

## 2021-06-09 NOTE — CONSULTS
[facility] Consult Note      Patient: Rosey Larsen MRN: [id number]  SSN: [id number]    YOB: 1980  Age: 38 y.o. Sex: female    Date of Consultation: 06/08/2021  Referring Physician: Flo Benitez   Reason for Consultation: Hypoxia    Chief complain: For induction of labor    HPI: 65-year-old female admitted to [facility] for induction of labor. Cardiology consult called for hypoxia. As per patient's  while patient was sleeping she was snoring and oxygen saturation was dropping. Lowest oxygen saturation while patient was sleeping was in mid 80s. Oxygen saturation remains normal when patient is awake. She has mild leg swelling. She is not in any respiratory distress. She denies any chest pain. She denies any dizziness, palpitation, presyncope or syncope. She denies any orthopnea or PND. Denies any smoking or alcohol abuse. She is ex-smoker. Past Medical History:   Diagnosis Date    Anemia     Gestational diabetes     Gestational hypertension     Morbid obesity ([address]     Morbid obesity with BMI of 40.0-44.9, adult ([address]     Prediabetes     Pregnancy induced hypertension, third trimester 6/7/2021     History reviewed. No pertinent surgical history.   Current Facility-Administered Medications   Medication Dose Route Frequency    oxytocin (PITOCIN) 30 units/500 ml NS  0-20 khurram-units/min IntraVENous TITRATE    [START ON 6/9/2021] NIFEdipine (PROCARDIA) capsule 20 mg  20 mg Oral TID    lactated Ringers infusion  25 mL/hr IntraVENous CONTINUOUS    sodium chloride (NS) flush 5-40 mL  5-40 mL IntraVENous Q8H    sodium chloride (NS) flush 5-40 mL  5-40 mL IntraVENous PRN    lactated ringers bolus infusion 500 mL  500 mL IntraVENous PRN    lactated Ringers infusion  125 mL/hr IntraVENous CONTINUOUS    butorphanol (STADOL) injection 2 mg  2 mg IntraVENous Q2H PRN    nalbuphine (NUBAIN) injection 10 mg  10 mg IntraVENous Q2H PRN    HYDROmorphone (PF) (DILAUDID) injection 1 mg  1 mg IntraVENous Q1H PRN    oxytocin (PITOCIN) 10 unit bolus from bag  10 Units IntraVENous PRN    And    oxytocin (PITOCIN) 30 units/500 ml NS  87.3 khurram-units/min IntraVENous PRN    methylergonovine (METHERGINE) 0.2 mg/mL (1 mL) injection 0.2 mg  0.2 mg IntraMUSCular PRN    terbutaline (BRETHINE) injection 0.25 mg  0.25 mg SubCUTAneous Q20MIN PRN    mineral oil 30 mL  30 mL Topical PRN    lidocaine (PF) (XYLOCAINE) 10 mg/mL (1 %) injection 20 mL  20 mL IntraDERMal PRN    penicillin G pot (PFIZERPEN) 2.5 Million Units in 50 ml 0.9% NaCl  2.5 Million Units IntraVENous Q4H    sodium chloride (NS) flush 5-40 mL  5-40 mL IntraVENous Q8H    sodium chloride (NS) flush 5-40 mL  5-40 mL IntraVENous PRN    fentaNYL 2 mcg/ml with ropivacaine 0.1 % (PF) epidural  1-15 mL/hr Epidural CONTINUOUS    lactated ringers bolus infusion 300 mL  300 mL IntraVENous PRN    naloxone (NARCAN) injection 0.2 mg  0.2 mg IntraVENous PRN    PHENYLephrine (IRIS-SYNEPHRINE) 100 mcg/mL in NS syringe 80 mcg  80 mcg IntraVENous PRN    diphenhydrAMINE (BENADRYL) injection 12.5 mg  12.5 mg IntraVENous Q4H PRN     Facility-Administered Medications Ordered in Other Encounters   Medication Dose Route Frequency    lidocaine (XYLOCAINE) 10 mg/mL (1 %) injection   SubCUTAneous PRN    lidocaine-EPINEPHrine (XYLOCAINE) 1.5 %-1:200,000 injection   Epidural PRN    fentaNYL citrate (PF) injection   Epidural PRN       Allergies and Intolerances:   No Known Allergies    Family History:   History reviewed. No pertinent family history. Social History:   She  reports that she quit smoking about 2 years ago. She has never used smokeless tobacco.  She  reports previous alcohol use. Review of Systems:     Gen: No fever, chills, malaise, weight loss/gain. Heent: No headache, rhinorrhea, epistaxis, ear pain, hearing loss, sinus pain, neck pain/stiffness, sore throat. Heart: No chest pain, palpitations, pnd, or orthopnea.    Resp: No cough, hemoptysis, wheezing and dyspnea. GI: No nausea, vomiting, diarrhea, constipation, melena or hematochezia. : No urinary obstruction, dysuria or hematuria. Derm: No rash, new skin lesion or pruritis. Musc/skeletal: no bone or joint complains. Vasc: No edema, cyanosis or claudication. Endo: No heat/cold intolerance, no polyuria,polydipsia or polyphagia. Neuro: No unilateral weakness, numbness, tingling. No seizures. Heme: No easy bruising or bleeding. Physical:   Patient Vitals for the past 6 hrs:   Temp Pulse Resp BP SpO2   06/08/21 2209     100 %   06/08/21 2204     99 %   06/08/21 2200  91  (!) 143/85    06/08/21 2159     100 %   06/08/21 2154     99 %   06/08/21 2142  94  138/73    06/08/21 2100  88  (!) 143/90    06/08/21 2030  88  (!) 146/85    06/08/21 2000  90  (!) 142/93    06/08/21 1937 98.8 °F (37.1 °C) (!) 108 28 130/72    06/08/21 1910     100 %   06/08/21 1907  (!) 116  129/68    06/08/21 1905     100 %   06/08/21 1904     100 %   06/08/21 1859     100 %   06/08/21 1854     97 %   06/08/21 1845     100 %   06/08/21 1840     100 %   06/08/21 1835     99 %   06/08/21 1824     94 %   06/08/21 1822     99 %   06/08/21 1818     100 %   06/08/21 1813     100 %   06/08/21 1812  83  (!) 154/90    06/08/21 1808     99 %   06/08/21 1803     98 %   06/08/21 1800  84  (!) 133/110    06/08/21 1758     100 %   06/08/21 1757     99 %   06/08/21 1752     100 %   06/08/21 1747     99 %   06/08/21 1745  88  (!) 147/95    06/08/21 1742     100 %   06/08/21 1737     100 %   06/08/21 1732     100 %   06/08/21 1727     100 %   06/08/21 1722     98 %   06/08/21 1721     99 %         Exam:   General Appearance: Comfortable, not using accessory muscles of respiration. HEENT: CRYSTAL. HEAD: Atraumatic  NECK: No JVD, no thyroidomeglay.  CAROTIDS: no bruit  LUNGS: Clear bilaterally. HEART: S1+S2 audible, no murmur, no pericardial rub. ABD: Non-tender, BS Audible    EXT:  Bilateral lower extremity + edema, and no cysnosis. VASCULAR EXAM: Pulses are intact. PSYCHIATRIC EXAM: Mood is appropriate. MUSCULOSKELETAL: Grossly no joint deformity.   NEUROLOGICAL: AAO times 3, Motor and sensory sytem intact     Review of Data:   LABS:   Lab Results   Component Value Date/Time    WBC 9.1 06/08/2021 08:45 AM    HGB 9.9 (L) 06/08/2021 08:45 AM    HCT 31.1 (L) 06/08/2021 08:45 AM    PLATELET 588 41/02/8230 08:45 AM     Lab Results   Component Value Date/Time    Sodium 137 06/08/2021 08:45 AM    Potassium 3.9 06/08/2021 08:45 AM    Chloride 106 06/08/2021 08:45 AM    CO2 22 06/08/2021 08:45 AM    Glucose 147 (H) 06/08/2021 08:45 AM    Glucose 85 05/16/2019 04:05 AM    BUN 8 06/08/2021 08:45 AM    Creatinine 0.67 06/08/2021 08:45 AM     No results found for: CHOL, CHOLX, CHLST, CHOLV, HDL, HDLP, LDL, LDLC, DLDLP, TGLX, TRIGL, TRIGP  No components found for: GPT  Lab Results   Component Value Date/Time    Hemoglobin A1c 6.1 (H) 04/02/2019 05:18 PM         Cardiology Procedures:   Results for orders placed or performed during the hospital encounter of 06/07/21   EKG, 12 LEAD, INITIAL   Result Value Ref Range    Ventricular Rate 67 BPM    Atrial Rate 67 BPM    P-R Interval 172 ms    QRS Duration 72 ms    Q-T Interval 384 ms    QTC Calculation (Bezet) 405 ms    Calculated P Axis 28 degrees    Calculated R Axis 23 degrees    Calculated T Axis 26 degrees    Diagnosis       Poor baseline   Sinus rhythm with marked sinus arrhythmia  Nonspecific ST abnormality  Abnormal ECG               Impression / Plan:    Patient Active Problem List   Diagnosis Code    Gestational diabetes O24.419    Perineal laceration, first degree, delivered O70.0    Anemia during pregnancy O99.019    Morbid obesity ([facility] [address] [address] E66.01    Morbid obesity with BMI of 40.0-44.9, adult ([facility]) E66.01, Z68.41    Prediabetes R73.03  Anemia D64.9    Decreased fetal movement O36.8190    Back pain M54.9    Swelling of both ankles M25.471, M25.472    Shortness of breath during pregnancy O99.891, R06.02    Decreased fetal movement affecting management of pregnancy in third trimester, fetus 1 G50.5419    Pregnancy induced hypertension, third trimester O13.3    History of pregnancy induced hypertension Z87.59         Hypoxemia R09.02    Snoring R06.83    Witnessed episode of apnea R06.81      hypertension     79-year-old female with hypoxia while she sleeps. Oxygen saturation improves when she is awake. There was concern about decompensated heart failure. Echocardiogram done today revealed    · Image quality for this study was suboptimal. Unable to administer Definity contrast due to pregnancy. · Left Ventricle: Normal cavity size and systolic function (ejection fraction normal). Mild concentric hypertrophy. The estimated EF is 60 - 65%. There is mild (grade 1) left ventricular diastolic dysfunction E/E' ratio is 11.81. Wall Scoring: The left ventricular wall motion is normal.  · Left Atrium: Mildly dilated left atrium. · Right Ventricle: Not well visualized. Normal global systolic function. Assessment of RV function: TAPSE = 26.3 mm. · Tricuspid Valve: Normal valve structure and no stenosis. Tricuspid regurgitation is inadequate for estimation of right ventricular systolic pressure. There is no evidence of decompensated heart failure. Hypoxia while patient is sleeping with snoring is suggestive of possible sleep apnea. Patient was given IV Lasix for volume overload. Echocardiogram finding and management plan discussed with patient and family member at bedside    From cardiac standpoint no further workup required at this point.     Plan discussed with Dr. Shruthi English discussed with Dr. Cesar Benitez    Total time spent 47 minutes  Signed By: Donald Romero MD     June 8, 2021

## 2021-06-09 NOTE — PROGRESS NOTES
Problem:  Delivery: Day of Delivery  Goal: Activity/Safety  Outcome: Progressing Towards Goal  Goal: Consults, if ordered  Outcome: Progressing Towards Goal  Goal: Diagnostic Test/Procedures  Outcome: Progressing Towards Goal  Goal: Nutrition/Diet  Outcome: Progressing Towards Goal  Goal: Discharge Planning  Outcome: Progressing Towards Goal  Goal: Medications  Outcome: Progressing Towards Goal  Goal: Respiratory  Outcome: Progressing Towards Goal  Goal: Treatments/Interventions/Procedures  Outcome: Progressing Towards Goal  Goal: Psychosocial  Outcome: Progressing Towards Goal  Goal: *Vital signs within defined limits  Outcome: Progressing Towards Goal  Goal: *Labs within defined limits  Outcome: Progressing Towards Goal  Goal: *Hemodynamically stable  Outcome: Progressing Towards Goal  Goal: *Optimal pain control at patient's stated goal  Outcome: Progressing Towards Goal  Goal: *Participates in infant care  Outcome: Progressing Towards Goal  Goal: *Demonstrates progressive activity  Outcome: Progressing Towards Goal  Goal: *Tolerating diet  Outcome: Progressing Towards Goal     Problem: Pain  Goal: *Control of Pain  Outcome: Progressing Towards Goal

## 2021-06-09 NOTE — LACTATION NOTE
Per mom, attempted feed, but baby wouldn't latch. Supply and demand discussed. Mom educated on breastfeeding basics--hunger cues, feeding on demand, waking baby if baby sleeps too long between feeds, importance of skin to skin, positioning and latching, risk of pacifier use and supplemental feedings, and importance of rooming in--and use of log sheet. Mom also educated on benefits of breastfeeding for herself and baby. Mom verbalized understanding. No questions at this time.

## 2021-06-09 NOTE — PROGRESS NOTES
Bedside and verbal report received by SINDHU Hamilton RN, care assumed at this time. Pt sitting up in chair during shift change. RN changing bed sheets. Assisted pt back to bed, readjusted EFm's, assessment completed.  Dr. Mary Andersen at bedside to discuss POC, Dr. Mary Andersen discussed with pt transferring care to [facility] 81 had an opportunity to ask questions, all questions answered, pt verbalizes understanding.   Dr. Mary Andersen at bedside for abdominal ultrasound.  Dr. Mary Andersen at bedside for SVE and to discuss POC, Will start pitocin through the night, if no change/baby by morning,  will section her. Pt verbalizes understanding. All questions asked and answered at this time. 0816 CRNA paged for epidural.     [id number] Respiratory at bedside to place bubbler for moisture. Pt's nose was getting dried out. 354  Dr. Mary Andersen at bedside for SVE, pt's cervix unchanged, discussed  and POC with pt, Pt agrees to , all questions asked and answered at this time. 0710 Bedside shift change report given to MADELINE Clark (oncoming nurse) by Kanika Sawyer RN (offgoing nurse). Report included the following information SBAR, Kardex, Procedure Summary, Intake/Output, MAR, Accordion, Recent Results, Med Rec Status, Pre Procedure Checklist, Procedure Verification and Quality Measures.

## 2021-06-09 NOTE — ANESTHESIA POSTPROCEDURE EVALUATION
Post-Anesthesia Evaluation & Assessment    Visit Vitals  /67   Pulse 84   Temp 36.2 °C (97.1 °F)   Resp 15   Ht 6' 1\" (1.854 m)   Wt 156.9 kg (346 lb)   SpO2 98%   BMI 45.65 kg/m²       Nausea/Vomiting: Controlled. Post-operative hydration adequate. Pain Scale 1: Numeric (0 - 10) (06/09/21 6533)  Pain Intensity 1: 0 (06/09/21 9429)   Managed    Pain score at or below stated goal level. Mental status & Level of consciousness: alert and oriented x 3    Neurological status: epidural anesthesia resolving    Pulmonary status: airway patent, adequate oxygenation. Complications related to anesthesia: none    Patient has met all PACU discharge requirements.       Liliam Mullen DO

## 2021-06-09 NOTE — PROGRESS NOTES
1830- TRANSFER - IN REPORT:  Bedside and verbal report received from MADELINE Gross RN on JARRED. GUERDA Weinberg  being received from Labor and Delivery for routine progression of care    Report consisted of patients Situation, Background, Assessment and   Recommendations(SBAR). Information from the following report(s) SBAR, OR Summary, Procedure Summary, Intake/Output, MAR and Recent Results was reviewed with the receiving nurse. 1920- Bedside and Verbal shift change report given to Poppy Dillard RN (oncoming nurse) by Diane Coughlin RN (offgoing nurse). Report included the following information SBAR, Kardex, Intake/Output, MAR and Recent Results. Opportunities for questions was provided.

## 2021-06-09 NOTE — PROGRESS NOTES
Pit at 25. Good pitocin try. Monitor:  Reactivity:present Variability:present Baseline:within normal limits  Contractions q 3-4    Vitals:  Blood pressure (!) 143/92, pulse 82, temperature 98.8 °F (37.1 °C), resp. rate 28, height 6' 1\" (1.854 m), weight 156.9 kg (346 lb), SpO2 98 %, not currently breastfeeding. Pelvic exam:  Cervix 4cm very high and anterior behind pubic symph   Effaced: 50%Station: -4   still ballotable    Plan:     C section.  Discussed with her and will arrange      Signed By: Mary Mesa MD                         June 9, 2021

## 2021-06-09 NOTE — PROGRESS NOTES
Patient resting in bed with IV infusing, begin prep for , epidural infusing patent and intact. 5172- In [facility]- TOB live female infant, apgars 8/9    0844- Back in labor room 7     1342- Spoke with Spencer Garcia given on patient    [id number] 68 71 79- Mom attempting to breast feed, baby sleepy    1745- Dinner tray given to patient     - TRANSFER - OUT REPORT:    Verbal report given to LIGIA Buchanan RN (name) on Jean Resendiz  being transferred to Mother Baby (unit) for routine progression of care       Report consisted of patients Situation, Background, Assessment and   Recommendations(SBAR). Information from the following report(s) SBAR, Kardex and MAR was reviewed with the receiving nurse. Lines:   Peripheral IV  Right Basilic (Active)   Site Assessment Clean, dry, & intact 21 183   Phlebitis Assessment 0 21 183   Infiltration Assessment 0 21 183   Dressing Status Clean, dry, & intact 21 183   Dressing Type Tape;Transparent 21 183   Hub Color/Line Status Infusing 21        Opportunity for questions and clarification was provided.       Patient transported with:   Registered Nurse

## 2021-06-09 NOTE — PROGRESS NOTES
Problem: Vaginal Delivery: Day of Deliver-Laboring  Goal: Off Pathway (Use only if patient is Off Pathway)  Outcome: Progressing Towards Goal  Goal: Activity/Safety  Outcome: Progressing Towards Goal  Goal: Consults, if ordered  Outcome: Progressing Towards Goal  Goal: Diagnostic Test/Procedures  Outcome: Progressing Towards Goal  Goal: Nutrition/Diet  Outcome: Progressing Towards Goal  Goal: Discharge Planning  Outcome: Progressing Towards Goal  Goal: Medications  Outcome: Progressing Towards Goal  Goal: Respiratory  Outcome: Progressing Towards Goal  Goal: Treatments/Interventions/Procedures  Outcome: Progressing Towards Goal  Goal: *Vital signs within defined limits  Outcome: Progressing Towards Goal  Goal: *Labs within defined limits  Outcome: Progressing Towards Goal  Goal: *Hemodynamically stable  Outcome: Progressing Towards Goal  Goal: *Optimal pain control at patient's stated goal  Outcome: Progressing Towards Goal     Problem: Falls - Risk of  Goal: *Absence of Falls  Description: Document Gato Fall Risk and appropriate interventions in the flowsheet.   Outcome: Progressing Towards Goal  Note: Fall Risk Interventions:                                Problem: Patient Education: Go to Patient Education Activity  Goal: Patient/Family Education  Outcome: Progressing Towards Goal

## 2021-06-10 LAB
HCT VFR BLD AUTO: 27.1 % (ref 35–45)
HGB BLD-MCNC: 8.4 G/DL (ref 12–16)

## 2021-06-10 PROCEDURE — 36415 COLL VENOUS BLD VENIPUNCTURE: CPT

## 2021-06-10 PROCEDURE — 74011250637 HC RX REV CODE- 250/637: Performed by: OBSTETRICS & GYNECOLOGY

## 2021-06-10 PROCEDURE — 86901 BLOOD TYPING SEROLOGIC RH(D): CPT

## 2021-06-10 PROCEDURE — 85461 HEMOGLOBIN FETAL: CPT

## 2021-06-10 PROCEDURE — 85018 HEMOGLOBIN: CPT

## 2021-06-10 PROCEDURE — 74011250637 HC RX REV CODE- 250/637: Performed by: ANESTHESIOLOGY

## 2021-06-10 PROCEDURE — 74011250636 HC RX REV CODE- 250/636: Performed by: OBSTETRICS & GYNECOLOGY

## 2021-06-10 PROCEDURE — 65270000029 HC RM PRIVATE

## 2021-06-10 RX ORDER — IBUPROFEN 400 MG/1
800 TABLET ORAL
Status: DISCONTINUED | OUTPATIENT
Start: 2021-06-10 | End: 2021-06-11 | Stop reason: HOSPADM

## 2021-06-10 RX ADMIN — DIPHENHYDRAMINE HYDROCHLORIDE 25 MG: 25 CAPSULE ORAL at 04:37

## 2021-06-10 RX ADMIN — KETOROLAC TROMETHAMINE 30 MG: 30 INJECTION, SOLUTION INTRAMUSCULAR; INTRAVENOUS at 03:45

## 2021-06-10 RX ADMIN — KETOROLAC TROMETHAMINE 30 MG: 30 INJECTION, SOLUTION INTRAMUSCULAR; INTRAVENOUS at 09:41

## 2021-06-10 RX ADMIN — IBUPROFEN 800 MG: 400 TABLET ORAL at 15:46

## 2021-06-10 RX ADMIN — OXYCODONE HYDROCHLORIDE AND ACETAMINOPHEN 1 TABLET: 5; 325 TABLET ORAL at 19:13

## 2021-06-10 RX ADMIN — ACETAMINOPHEN 650 MG: 325 TABLET ORAL at 08:09

## 2021-06-10 RX ADMIN — NIFEDIPINE 60 MG: 30 TABLET, FILM COATED, EXTENDED RELEASE ORAL at 08:41

## 2021-06-10 NOTE — LACTATION NOTE
Per mom, infant latching and nursing sometimes, but also doing bottles. Supply and demand reviewed. Will remain available as needed.

## 2021-06-10 NOTE — PROGRESS NOTES
0725 Bedside and Verbal shift change report given to Ana Syed (oncoming nurse) by Jay Jay Stephens (offgoing nurse). Report included the following information SBAR, Kardex, Procedure Summary, Intake/Output and Recent Results. 0820 VS noted; assessment done. See flowsheets 0830 infant to nursery for 24hr discharge screenings 0940 infant sleeping in crib; no distress observed 1145 infant sleeping supine in bassinet at present; no distress observed. Feed reported by mom 1345 infant sleeping in bassinet; no distress observed 1420 infant to nursery for repeat TCBili; serum obtained and sent to lab; 
 
1450 Mom updated by Amy on infant bili and exam 
 
1500 VS noted; assessment done; see flowsheets 1555 infant awake; mom reports feed . Add'l formula given as infant remains awake and active

## 2021-06-10 NOTE — PROGRESS NOTES
1910 Bedside report received from Abigail Colbert RN. Pt. Stable. Needs addressed. Callbell within reach. 2110 Pt. Joined at bedside by  baby. AAOx4. Vital signs stable. Pain 0/10. Educated on plan of care and signs and symptoms to report. No further questions or concerns at this time. Fundus firm at U-1, small rubra lochia. No clots noted. Assessment complete. Callbell within reach. Bed in lowest position. 2245 Pt voided 300 mL urine. 2340 Bedside shift change report given to Broderick Hong RN (oncoming nurse) by Delores Bojorquez RN (offgoing nurse). Report included the following information SBAR, Kardex, Procedure Summary, Intake/Output, MAR and Recent Results.

## 2021-06-10 NOTE — PROGRESS NOTES
Post-Operative  Day 1    Deyanira Perez     Assessment:   Hospital Problems  Date Reviewed: 2021        Codes Class Noted POA    Hypoxemia ICD-10-CM: R09.02  ICD-9-CM: 799.02  2021 Unknown        Snoring ICD-10-CM: R06.83  ICD-9-CM: 786.09  2021 Unknown        Witnessed episode of apnea ICD-10-CM: R06.81  ICD-9-CM: 786.03  2021 Unknown        Back pain ICD-10-CM: M54.9  ICD-9-CM: 724.5  2021 Yes        * (Principal) Swelling of both ankles ICD-10-CM: M25.471, M25.472  ICD-9-CM: 729.81  2021 Yes        Shortness of breath during pregnancy ICD-10-CM: O99.891, R06.02  ICD-9-CM: 646.83, 786.05  2021 Yes        Decreased fetal movement affecting management of pregnancy in third trimester, fetus 1 ICD-10-CM: H96.3866  ICD-9-CM: 655.73  2021 Yes        Pregnancy induced hypertension, third trimester ICD-10-CM: O13.3  ICD-9-CM: 642.33  2021 Yes        History of pregnancy induced hypertension ICD-10-CM: Z87.59  ICD-9-CM: V13.29  2021 Yes        Orthopnea ICD-10-CM: R06.01  ICD-9-CM: 786.02  2021 Unknown        Morbid obesity (HCC) ICD-10-CM: E66.01  ICD-9-CM: 278.01  Unknown Yes        Anemia during pregnancy ICD-10-CM: O99.019  ICD-9-CM: 648.20  5/15/2019 Yes        Gestational diabetes ICD-10-CM: O24.419  ICD-9-CM: 648.80  2019 Yes            Post-Op day 1, stable    Plan:   1. Routine post-operative care   2. Will monitor blood pressure; seen and evaluated by cardiology    Persistent mild range BP will continue procardia for now    May need adjustment prior to discharge    Information for the patient's :  Leandra Lira [[id number]]   , Low Transverse    Patient doing well without significant complaint.  Nausea and vomiting resolved, tolerating liquids, no flatus, alcantara removed overnight     Current Facility-Administered Medications   Medication Dose Route Frequency    sodium chloride (NS) flush 5-40 mL  5-40 mL IntraVENous Q8H    lactated Ringers infusion  125 mL/hr IntraVENous CONTINUOUS    ketorolac (TORADOL) injection 30 mg  30 mg IntraVENous Q6H    NIFEdipine ER (PROCARDIA XL) tablet 60 mg  60 mg Oral DAILY    lactated Ringers infusion  125 mL/hr IntraVENous CONTINUOUS    oxytocin (PITOCIN) 30 units/500 ml NS  0-20 khurram-units/min IntraVENous TITRATE    lactated Ringers infusion  25 mL/hr IntraVENous CONTINUOUS    sodium chloride (NS) flush 5-40 mL  5-40 mL IntraVENous Q8H        Vitals:  Visit Vitals  BP (!) 142/84 (BP 1 Location: Left upper arm, BP Patient Position: At rest)   Pulse (!) 117   Temp 98.7 °F (37.1 °C)   Resp 18   Ht 6' 1\" (1.854 m)   Wt 156.9 kg (346 lb)   SpO2 98%   Breastfeeding Unknown   BMI 45.65 kg/m²     Temp (24hrs), Av.5 °F (36.9 °C), Min:97.1 °F (36.2 °C), Max:99.2 °F (37.3 °C)      Last 24hr Input/Output:    Intake/Output Summary (Last 24 hours) at 6/10/2021 0801  Last data filed at 6/10/2021 0650  Gross per 24 hour   Intake 1000 ml   Output 2550 ml   Net -1550 ml          Exam:        Patient without distress. Lungs clear. Abdomen, bowel sounds present, soft, expected tenderness, fundus firm Wound dressing intact               Lower extremities are negative for swelling, cords or tenderness.     Labs:   Lab Results   Component Value Date/Time    WBC 8.7 2021 06:50 AM    WBC 9.1 2021 08:45 AM    WBC 8.2 2021 05:35 AM    HGB 8.4 (L) 06/10/2021 07:10 AM    HGB 9.2 (L) 2021 06:50 AM    HGB 9.9 (L) 2021 08:45 AM    HCT 27.1 (L) 06/10/2021 07:10 AM    HCT 29.9 (L) 2021 06:50 AM    HCT 31.1 (L) 2021 08:45 AM    PLATELET 280  06:50 AM    PLATELET 931 10 08:45 AM    PLATELET 362  05:35 AM       Recent Results (from the past 24 hour(s))   HGB & HCT    Collection Time: 06/10/21  7:10 AM   Result Value Ref Range    HGB 8.4 (L) 12.0 - 16.0 g/dL    HCT 27.1 (L) 35.0 - 45.0 %       Jourdan Anthony MD  [id number]  6/10/2021  8:01 AM

## 2021-06-10 NOTE — LACTATION NOTE
This note was copied from a baby's chart. 1830 mom asleep at this time. Will return.  per mom,  was just fed 40 ml from the bottle. Discussed breastfeeding goals, as well as supply and demand. Mom verbalized understanding.

## 2021-06-10 NOTE — PROGRESS NOTES
2340- Bedside and Verbal shift change report given to Rachell Renteria RN (oncoming nurse) by Diane Mcarthur RN (offgoing nurse). Report included the following information SBAR, Intake/Output, MAR and Recent Results. 0930- Bedside and Verbal shift change report given to Stephie Perrin RN (oncoming nurse) by Rachell Renteria RN (offgoing nurse). Report included the following information SBAR, Intake/Output, MAR and Recent Results.

## 2021-06-10 NOTE — PROGRESS NOTES
ANESTHESIA    POD #1 CS  Epidural Narcotics  Pain rated  4/10 by patient. Doing well. She has been OOB without issues. No complications. Full return neuromuscular function and no headache.

## 2021-06-10 NOTE — PROGRESS NOTES
Problem: Falls - Risk of  Goal: *Absence of Falls  Description: Document Marizol Garcia Fall Risk and appropriate interventions in the flowsheet.   Outcome: Progressing Towards Goal  Note: Fall Risk Interventions:            Medication Interventions: Teach patient to arise slowly                   Problem: Patient Education: Go to Patient Education Activity  Goal: Patient/Family Education  Outcome: Progressing Towards Goal     Problem: Pain  Goal: *Control of Pain  Outcome: Progressing Towards Goal     Problem:  Delivery: Postpartum Day 1  Goal: Off Pathway (Use only if patient is Off Pathway)  Outcome: Progressing Towards Goal  Goal: Activity/Safety  Outcome: Progressing Towards Goal  Goal: Consults, if ordered  Outcome: Progressing Towards Goal  Goal: Diagnostic Test/Procedures  Outcome: Progressing Towards Goal  Goal: Discharge Planning  Outcome: Progressing Towards Goal  Goal: Medications  Outcome: Progressing Towards Goal  Goal: Respiratory  Outcome: Progressing Towards Goal  Goal: Treatments/Interventions/Procedures  Outcome: Progressing Towards Goal  Goal: *Vital signs within defined limits  Outcome: Progressing Towards Goal  Goal: *Labs within defined limits  Outcome: Progressing Towards Goal  Goal: *Hemodynamically stable  Outcome: Progressing Towards Goal  Goal: *Optimal pain control at patient's stated goal  Outcome: Progressing Towards Goal  Goal: *Demonstrates progressive activity  Outcome: Progressing Towards Goal

## 2021-06-10 NOTE — PROGRESS NOTES
Problem: Pain  Goal: *Control of Pain  Outcome: Progressing Towards Goal     Problem:  Delivery: Postpartum Day 1  Goal: Activity/Safety  Outcome: Progressing Towards Goal  Goal: Diagnostic Test/Procedures  Outcome: Progressing Towards Goal  Goal: Discharge Planning  Outcome: Progressing Towards Goal  Goal: Medications  Outcome: Progressing Towards Goal  Goal: *Vital signs within defined limits  Outcome: Progressing Towards Goal  Goal: *Labs within defined limits  Outcome: Progressing Towards Goal  Goal: *Hemodynamically stable  Outcome: Progressing Towards Goal  Goal: *Optimal pain control at patient's stated goal  Outcome: Progressing Towards Goal  Goal: *Demonstrates progressive activity  Outcome: Progressing Towards Goal

## 2021-06-11 VITALS
HEART RATE: 97 BPM | OXYGEN SATURATION: 98 % | DIASTOLIC BLOOD PRESSURE: 60 MMHG | WEIGHT: 293 LBS | BODY MASS INDEX: 39.68 KG/M2 | RESPIRATION RATE: 17 BRPM | TEMPERATURE: 98.3 F | HEIGHT: 72 IN | SYSTOLIC BLOOD PRESSURE: 112 MMHG

## 2021-06-11 LAB
ABO + RH BLD: NORMAL
BLD PROD TYP BPU: NORMAL
BLD PROD TYP BPU: NORMAL
BLOOD GROUP ANTIBODIES SERPL: NORMAL
BPU ID: NORMAL
BPU ID: NORMAL
CALLED TO:,BCALL1: NORMAL
CROSSMATCH RESULT,%XM: NORMAL
CROSSMATCH RESULT,%XM: NORMAL
SPECIMEN EXP DATE BLD: NORMAL
STATUS OF UNIT,%ST: NORMAL
STATUS OF UNIT,%ST: NORMAL
UNIT DIVISION, %UDIV: 0
UNIT DIVISION, %UDIV: 0

## 2021-06-11 PROCEDURE — 74011250637 HC RX REV CODE- 250/637: Performed by: OBSTETRICS & GYNECOLOGY

## 2021-06-11 PROCEDURE — 74011250636 HC RX REV CODE- 250/636: Performed by: OBSTETRICS & GYNECOLOGY

## 2021-06-11 RX ORDER — OXYCODONE AND ACETAMINOPHEN 5; 325 MG/1; MG/1
1-2 TABLET ORAL
Qty: 30 TABLET | Refills: 0 | Status: SHIPPED | OUTPATIENT
Start: 2021-06-11 | End: 2021-06-14

## 2021-06-11 RX ADMIN — HUMAN RHO(D) IMMUNE GLOBULIN 0.3 MG: 300 INJECTION, SOLUTION INTRAMUSCULAR at 05:53

## 2021-06-11 RX ADMIN — OXYCODONE HYDROCHLORIDE AND ACETAMINOPHEN 1 TABLET: 5; 325 TABLET ORAL at 06:19

## 2021-06-11 RX ADMIN — OXYCODONE HYDROCHLORIDE AND ACETAMINOPHEN 2 TABLET: 5; 325 TABLET ORAL at 00:08

## 2021-06-11 RX ADMIN — IBUPROFEN 800 MG: 400 TABLET ORAL at 01:35

## 2021-06-11 RX ADMIN — NIFEDIPINE 60 MG: 30 TABLET, FILM COATED, EXTENDED RELEASE ORAL at 08:35

## 2021-06-11 NOTE — PROGRESS NOTES
Progress Note                               Patient: [facility] MRN: [id number]  SSN: [id number]    YOB: 1980  Age: 38 y.o. Sex: female      2 Days Post-Op     Subjective:     Patient doing well postpartum without significant complaints. Voiding without difficulty. Patient reports normal lochia. . Breastfeeding: Yes     Objective:     Patient Vitals for the past 18 hrs:   Temp Pulse Resp BP SpO2   21 0608 98.5 °F (36.9 °C) 99 18 137/84 99 %   21 0553 98.4 °F (36.9 °C) 100 18 135/68 98 %   21 0100 98.6 °F (37 °C) 97 19 138/80 99 %   06/10/21 2300 98.2 °F (36.8 °C) (!) 112 19 129/87 99 %   06/10/21 1445 98.5 °F (36.9 °C) (!) 109 16 134/85 99 %       Temp (24hrs), Av.5 °F (36.9 °C), Min:98.2 °F (36.8 °C), Max:98.7 °F (37.1 °C)      Physical Exam:    General:   Patient without distress. Abdomen: Soft, fundus firm at level of umbilicus, nontender   Incision: Clean, dry, and intact without erythema   Lower Extremities: Negative for swelling, cords, or tenderness        Lab/Data Review: All lab results for the last 24 hours reviewed. Assessment and Plan:     Patient appears to be having an uncomplicated post- course. Continue routine postoperative care and maternal education.

## 2021-06-11 NOTE — PROGRESS NOTES
2330- Bedside and Verbal shift change report given to Hayden Goins RN (oncoming nurse) by Deepak Marrero RN (offgoing nurse). Report included the following information SBAR, Intake/Output, MAR and Recent Results. 0005- Pt rates paini 8/10. Pt describes pain as \"it feels like my incision is tearing open\". Incision inspected at this time. Incision clean, dry, and intact. Edges approximated, no bleeding noted. 2 tab Percocet given at this time. 0100- Pt requesting more blankets. Pt experiencing chills. VSS. Will continue to monitor. 0135- Pt rates pain 6/10. Pt states pain began when leaving the bed to use the bathroom. Motrin given without complications. Pt encouraged to get out of bed and ambulate slowly when needed to avoid excessive pain. 0245- Rounded on pt, laying comfortably in bed holding infant. No needs expressed at this time. Bed in lowest position, call bell in reach. 0345- Pt resting comfortably in bed with eyes closed. Chest expansion noted. Bed in lowest position, call bell within reach. 9728- Rounded on pt, laying comfortably in bed eating food. No needs expressed at this time. Bed in lowest position, call bell in reach. 0630- Rounded on pt, laying comfortably in bed. No needs expressed at this time. Bed in lowest position, call bell in reach. 0715- Bedside and Verbal shift change report given to Xin Velásquez RN (oncoming nurse) by Hayden Goins RN (offgoing nurse). Report included the following information SBAR, Intake/Output, MAR and Recent Results.

## 2021-06-11 NOTE — PROGRESS NOTES
Problem:  Delivery: Postpartum Day 1  Goal: Activity/Safety  Outcome: Progressing Towards Goal  Goal: Diagnostic Test/Procedures  Outcome: Progressing Towards Goal  Goal: Discharge Planning  Outcome: Progressing Towards Goal  Goal: *Vital signs within defined limits  Outcome: Progressing Towards Goal  Goal: *Labs within defined limits  Outcome: Progressing Towards Goal  Goal: *Hemodynamically stable  Outcome: Progressing Towards Goal  Goal: *Optimal pain control at patient's stated goal  Outcome: Progressing Towards Goal  Goal: *Demonstrates progressive activity  Outcome: Progressing Towards Goal     Problem: Pain  Goal: *Control of Pain  Outcome: Progressing Towards Goal

## 2021-06-11 NOTE — PROGRESS NOTES
Problem: Falls - Risk of  Goal: *Absence of Falls  Description: Document Rashaad Abreu Fall Risk and appropriate interventions in the flowsheet.   Outcome: Progressing Towards Goal  Note: Fall Risk Interventions:            Medication Interventions: Teach patient to arise slowly                   Problem: Patient Education: Go to Patient Education Activity  Goal: Patient/Family Education  Outcome: Progressing Towards Goal     Problem: Pain  Goal: *Control of Pain  Outcome: Progressing Towards Goal     Problem:  Delivery: Postpartum Day 2  Goal: Activity/Safety  Outcome: Progressing Towards Goal  Goal: Consults, if ordered  Outcome: Progressing Towards Goal  Goal: Nutrition/Diet  Outcome: Progressing Towards Goal  Goal: Discharge Planning  Outcome: Progressing Towards Goal  Goal: Medications  Outcome: Progressing Towards Goal  Goal: Treatments/Interventions/Procedures  Outcome: Progressing Towards Goal  Goal: Psychosocial  Outcome: Progressing Towards Goal  Goal: *Vital signs within defined limits  Outcome: Progressing Towards Goal  Goal: *Labs within defined limits  Outcome: Progressing Towards Goal  Goal: *Hemodynamically stable  Outcome: Progressing Towards Goal  Goal: *Optimal pain control at patient's stated goal  Outcome: Progressing Towards Goal  Goal: *Participates in infant care  Outcome: Progressing Towards Goal  Goal: *Demonstrates progressive activity  Outcome: Progressing Towards Goal  Goal: *Appropriate parent-infant bonding  Outcome: Progressing Towards Goal  Goal: *Tolerating diet  Outcome: Progressing Towards Goal

## 2021-06-11 NOTE — PROGRESS NOTES
Problem:  Delivery: Discharge Outcomes  Goal: *Follow-up appointments as indicated  Outcome: Progressing Towards Goal  Goal: *Describes available resources and support systems  Outcome: Progressing Towards Goal  Goal: *No signs and symptoms of infection  Outcome: Progressing Towards Goal  Goal: *Birth certificate information completed  Outcome: Progressing Towards Goal  Goal: *Received and verbalizes understanding of discharge plan and instructions  Outcome: Progressing Towards Goal  Goal: *Vital signs within defined limits  Outcome: Progressing Towards Goal  Goal: *Labs within defined limits  Outcome: Progressing Towards Goal  Goal: *Hemodynamically stable  Outcome: Progressing Towards Goal  Goal: *Optimal pain control at patient's stated goal  Outcome: Progressing Towards Goal  Goal: *Participates in infant care  Outcome: Progressing Towards Goal  Goal: *Demonstrates progressive activity  Outcome: Progressing Towards Goal  Goal: *Appropriate parent-infant bonding  Outcome: Progressing Towards Goal  Goal: *Tolerating diet  Outcome: Progressing Towards Goal     Problem: Pain  Goal: *Control of Pain  Outcome: Progressing Towards Goal

## 2021-06-11 NOTE — ROUTINE PROCESS
Bedside and Verbal shift change report given to GUERDA Inman RN (oncoming nurse) by FLAVIA Silverman RN (offgoing nurse). Report included the following information SBAR, Kardex, Intake/Output, MAR and Recent Results.

## 2021-06-11 NOTE — LACTATION NOTE
Breastfeeding discharge teaching completed to include feeding on demand, foremilk and hindmilk importance, engorgement, mastitis, clogged ducts, pumping, breastmilk storage, and returning to work. Information given about unit and office phone numbers and encouraged mom to reach out if concerns arise, but that [facility] would be calling her in the next few days to follow up on breastfeeding. Mom verbalized understanding and no questions at this time.

## 2021-06-11 NOTE — DISCHARGE INSTRUCTIONS
POST DELIVERY DISCHARGE INSTRUCTIONS    Name: Jaqueline Jewell  YOB: 1980  Primary Diagnosis: Principal Problem:    Swelling of both ankles (6/7/2021)    Active Problems:    Gestational diabetes (5/13/2019)      Anemia during pregnancy (5/15/2019)      Morbid obesity (HCC) ()      Back pain (6/7/2021)      Shortness of breath during pregnancy (6/7/2021)      Decreased fetal movement affecting management of pregnancy in third trimester, fetus 1 (6/7/2021)      Pregnancy induced hypertension, third trimester (6/7/2021)      History of pregnancy induced hypertension (6/7/2021)      Orthopnea (6/7/2021)      Hypoxemia (6/8/2021)      Snoring (6/8/2021)      Witnessed episode of apnea (6/8/2021)        General:     Diet/Diet Restrictions:  Eight 8-ounce glasses of fluid daily (water, juices); avoid excessive caffeine intake. Meals/snacks as desired which are high in fiber and carbohydrates and low in fat and cholesterol. Physical Activity / Restrictions / Safety:     Avoid heavy lifting, no more that 8 lbs. For 2-3 weeks; limit use of stairs to 2 times daily for the first week home. No driving for one week. Avoid intercourse 4-6 weeks, no douching or tampon use. Check with obstetrician before starting or resuming an exercise program.       Discharge Instructions/Special Treatment/Home Care Needs:     Continue prenatal vitamins. Continue to use squirt bottle with warm water on your episiotomy after each bathroom use until bleeding stops. Call your doctor for the following:     Fever over 101 degrees by mouth. Vaginal bleeding heavier than a normal menstrual period or clot larger than a golf ball. Red streaks or increased swelling of legs, painful red streaks on your breast.  Painful urination, constipation and increased pain or swelling or discharge with your incision. If you feel extremely anxious or overwhelmed. If you have thoughts of harming yourself and/or your baby.     Pain Management: Pain Management:   Take Acetaminophen (Tylenol) or Ibuprofen (Advil, Motrin), as directed for pain. Use a warm Sitz bath 3 times daily to relieve episiotomy or hemorrhoidal discomfort. Heating pad to  incision as needed. For hemorrhoidal discomfort, use Tucks and Anusol cream as needed and directed    Follow-Up Care: These are general instructions for a healthy lifestyle:    No smoking/ No tobacco products/ Avoid exposure to second hand smoke    Surgeon General's Warning:  Quitting smoking now greatly reduces serious risk to your health. Obesity, smoking, and sedentary lifestyle greatly increases your risk for illness    A healthy diet, regular physical exercise & weight monitoring are important for maintaining a healthy lifestyle    Recognize signs and symptoms of STROKE:    F-face looks uneven    A-arms unable to move or move unevenly    S-speech slurred or non-existent    T-time-call 911 as soon as signs and symptoms begin-DO NOT go       Back to bed or wait to see if you get better-TIME IS BRAIN.

## 2021-06-11 NOTE — PROGRESS NOTES
0725 Bedside and Verbal shift change report given to [facility] (oncoming nurse) by Andrew Sánchez (offgoing nurse). Report included the following information SBAR, Kardex, Procedure Summary, Intake/Output, MAR and Recent Results. 0805 VS noted; assessment done; see flowsheets  Tylenol given for c/o pain;     0940 pt resting    1145 No c/o verbalized when checking on pt    1345 pt snoring while sleeping; no distress    1425 pt amb to BR; any activity well    1445 VS noted; assessment complete; see flowsheets    1546 Motrin given for c/o pain    1730 pt sleeping in bed    1855 pt c/o pain; will provide med. Sitting up in bed to feed infant    1925 Bedside and Verbal shift change report given to [facility] (oncoming nurse) by Saloni Barreto (offgoing nurse). Report included the following information SBAR, Kardex, OR Summary, Intake/Output, MAR and Recent Results.

## 2021-06-11 NOTE — PROGRESS NOTES
8591 Bedside and Verbal shift change report given to 1011 Carlos Cifuentes (oncoming nurse) by Ignacio Padron (offgoing nurse). Report included the following information SBAR, Kardex, Procedure Summary, Intake/Output, MAR and Recent Results. 18 Dr Destinee Martin in; orders written for discharge    0820 VS noted; assessment done; see flowsheets    21  I have reviewed discharge instructions with the patient. The patient verbalized understanding. AVS given to pt; reviewed when last pain meds taken. Gave instructions to call provider re: Procardia. 1020 Patient armband removed and shredded    1030 Pt in wheelchair; escorted to main entrance door by this RN for discharge to home under self care.  No distress observed at time of discharge

## 2021-06-12 LAB
ABO + RH BLD: NORMAL
ABO + RH BLDCO: NORMAL
BLD PROD TYP BPU: NORMAL
BPU ID: NORMAL
CALLED TO:,BCALL1: NORMAL
FETAL SCREEN,FMHS: NORMAL
STATUS OF UNIT,%ST: NORMAL
UNIT DIVISION, %UDIV: 0

## 2021-06-28 NOTE — DISCHARGE SUMMARY
Discharge Summary    Admit date: 2021  Discharge date:  2021    Name: Deni Kwong Record Number: [id number]   YOB: 1980    Postoperative Diagnosis:     Procedure: Low Cervical Transverse Procedure(s):   SECTION      Significant admission findings ( see H&P): Patient is a 38 y.o. Galilea Peyer  female admitted with gestational diabetes diet only. Severe SOB, significantly worse lying flat. Some low pelvic and low back pain, but no contractions. Bilateral ankle edema, becoming worse. PIH became apparent after admit. Hospital course: Patient was admitted,Cannot lay flat, could not have Foleybulb inserted, because of this. Improvement with lasix  . Obvious severe sleep apnea (80% sat). Pitocin up to 18, contractions q 2-3. Epidural working.      Hospitalist called. Swelling is improved a little  Baby doing very well so far (even through  80%sat spells with sleep apnea). Pitocin used for prolonged induction (cytotec not successful). Induction failed. Emergency C Section was performed. Baby weight and Apgars are shown below. Procedure was uneventful, with no obvious Complications. Postop course was uneventful, with no significant fever, and vitals normal, except as mentioned below. She mobilized well, didbreast-feed. She was discharged home on the third day post partum. She was asked to see us in the office in 1 week. She had good support at home, and will call if she has any problems before she sees us in the office. Incision looked good prior to discharge with no sign of hematoma, swelling, or infection. Rhogam given before discharge. Home on labetalol.     Findings:     Anesthesia: Epidural    Birth Information:   Information for the patient's :  Brandan aPrr [[id number]]          Pelvic findings at surgery: normal    Estimated Blood Loss: 800    Relevant Lab:     ABO/Rh(D)   Date Value Ref Range Status 06/10/2021 O NEGATIVE  Final     ABO,Rh   Date Value Ref Range Status   01/02/2019 O negative  Final     WBC   Date Value Ref Range Status   06/09/2021 8.7 4.6 - 13.2 K/uL Final   06/08/2021 9.1 4.6 - 13.2 K/uL Final     HGB   Date Value Ref Range Status   06/10/2021 8.4 (L) 12.0 - 16.0 g/dL Final   06/09/2021 9.2 (L) 12.0 - 16.0 g/dL Final     HCT   Date Value Ref Range Status   06/10/2021 27.1 (L) 35.0 - 45.0 % Final   06/09/2021 29.9 (L) 35.0 - 45.0 % Final     PLATELET   Date Value Ref Range Status   06/09/2021 219 135 - 420 K/uL Final   06/08/2021 218 135 - 420 K/uL Final       Lab Results   Component Value Date/Time    Rubella, External immune 01/02/2019 12:00 AM    GrBStrep, External negative 04/19/2019 12:00 AM       Discharge Medication List as of 6/11/2021 10:08 AM      START taking these medications    Details   benzocaine-menthoL (DERMOPLAST) 20-0.5 % aero Apply 1 Spray to affected area as needed for Other (Episiotomy or hemorrhoids). , Normal, Disp-1 Spray, R-0      oxyCODONE-acetaminophen (PERCOCET) 5-325 mg per tablet Take 1-2 Tablets by mouth every four (4) hours as needed for Pain for up to 3 days. Max Daily Amount: 12 Tablets., Normal, Disp-30 Tablet, R-0         CONTINUE these medications which have NOT CHANGED    Details   ferrous sulfate 325 mg (65 mg iron) tablet Take  by mouth Daily (before breakfast). , Historical Med      PNV No12-Iron-FA-DSS-OM-3 29 mg iron-1 mg -50 mg CPKD Take 1 Tab by mouth., Historical Med      ibuprofen (MOTRIN) 800 mg tablet Take 1 Tab by mouth every eight (8) hours as needed for Pain., Normal, Disp-60 Tab, R-1         STOP taking these medications       labetalol (NORMODYNE) 200 mg tablet Comments:   Reason for Stopping:                Disposition:   Home with family    Signed: Maria Del Carmen Patel MD      June 28, 2021

## 2021-09-01 NOTE — PROGRESS NOTES
[facility] Progress Note    Date: 2021    Name: Angella Holley    MRN: [id number]         : 1980    Rhogam Injection      Ms. Perez to [facility], ambulatory at [phone]. Pt was assessed and education was provided. Pt given written info about Rhogam; explained reason for giving to Rh negative mothers. Pt verbalized understanding and signed consent form. Ms. Preeti Berger vitals were reviewed and patient was observed for 5 minutes prior to treatment. Visit Vitals  /86   Pulse (!) 108   Temp (!) 96.6 °F (35.9 °C)   Resp 18   SpO2 100%   Breastfeeding No     Pt's blood type is O negative, which is within ordered parameters to give Rhogam today. Rhogam 300 mcg was administered IM in right deltoid. No irritation or bleeding noted at site. Bandaid applied. Ms. Kiana Jones tolerated well, and had no complaints. Pt politely declined observation for 30 minutes, states she has received Rhogam in the past without difficulty. She denied itching/hives/rash, SOB, difficulty swallowing or speaking, chest pain, any swelling or any other signs of allergic reaction. Pt given printed copy of discharge instructions; reviewed with her symptoms that require medical attention. Pt given Rhogam card with lot #, expiration date, etc.     Patient armband removed and shredded. Ms. Kiana Jones was discharged from [place] [address] in stable condition at 1150. She is to follow-up with Dr. Katrina Foss as instructed.     Isaías Jolly RN  2021
No

## 2022-02-23 ENCOUNTER — DOCUMENTATION ONLY (OUTPATIENT)
Dept: SURGERY | Age: 42
End: 2022-02-23

## 2022-03-18 PROBLEM — R06.83 SNORING: Status: ACTIVE | Noted: 2021-06-08

## 2022-03-18 PROBLEM — M25.472 SWELLING OF BOTH ANKLES: Status: ACTIVE | Noted: 2021-06-07

## 2022-03-18 PROBLEM — M25.471 SWELLING OF BOTH ANKLES: Status: ACTIVE | Noted: 2021-06-07

## 2022-03-18 PROBLEM — R06.81 WITNESSED EPISODE OF APNEA: Status: ACTIVE | Noted: 2021-06-08

## 2022-03-18 PROBLEM — O99.891 SHORTNESS OF BREATH DURING PREGNANCY: Status: ACTIVE | Noted: 2021-06-07

## 2022-03-18 PROBLEM — R09.02 HYPOXEMIA: Status: ACTIVE | Noted: 2021-06-08

## 2022-03-18 PROBLEM — R06.02 SHORTNESS OF BREATH DURING PREGNANCY: Status: ACTIVE | Noted: 2021-06-07

## 2022-03-19 PROBLEM — R06.01 ORTHOPNEA: Status: ACTIVE | Noted: 2021-06-07

## 2022-03-19 PROBLEM — O36.8131 DECREASED FETAL MOVEMENT AFFECTING MANAGEMENT OF PREGNANCY IN THIRD TRIMESTER, FETUS 1: Status: ACTIVE | Noted: 2021-06-07

## 2022-03-19 PROBLEM — O36.8190 DECREASED FETAL MOVEMENT: Status: ACTIVE | Noted: 2021-04-14

## 2022-03-19 PROBLEM — O99.019 ANEMIA DURING PREGNANCY: Status: ACTIVE | Noted: 2019-05-15

## 2022-03-19 PROBLEM — O13.3 PREGNANCY INDUCED HYPERTENSION, THIRD TRIMESTER: Status: ACTIVE | Noted: 2021-06-07

## 2022-03-20 PROBLEM — M54.9 BACK PAIN: Status: ACTIVE | Noted: 2021-06-07

## 2022-03-20 PROBLEM — O24.419 GESTATIONAL DIABETES: Status: ACTIVE | Noted: 2019-05-13

## 2022-03-20 PROBLEM — Z87.59 HISTORY OF PREGNANCY INDUCED HYPERTENSION: Status: ACTIVE | Noted: 2021-06-07

## 2022-03-23 ENCOUNTER — OFFICE VISIT (OUTPATIENT)
Dept: SURGERY | Age: 42
End: 2022-03-23

## 2022-03-23 VITALS — WEIGHT: 293 LBS | BODY MASS INDEX: 39.68 KG/M2 | HEIGHT: 72 IN

## 2022-03-23 DIAGNOSIS — E66.01 MORBID OBESITY (HCC): Primary | ICD-10-CM

## 2022-03-23 NOTE — PROGRESS NOTES
[facility]  Multidisciplinary Weight Loss    Name: Po Brownlee   : 1980    Session# 1  Date: 3/23/2022    Visit Vitals  Ht 6' 1.5\" (1.867 m)   Wt 142.7 kg (314 lb 9.6 oz)   BMI 40.94 kg/m²       Pt has transferred from Dr Ana Soto to Dr Baldev obrien. Pt attended nutrition class and has not been seen by a provider for initial transfer consult. Dietary Instructions    Pt attended 90 min in-person class. Topics reviewed: Behavior modification techniques, pre-op diet key principles (Including - Understanding low carbohydrates, low sugar, higher protein meals, no meal skipping, protein sources, carbohydrate sources, 64 ounces of non-caloric fluid, etc.). Encouraged pt follow a low carbohydrate, high protein, 1200 kcal diet with no liquids at meal table (for preparation of post-op 30:30 rule recommendation), high protein, 64+ oz no sugar, no caffeine, no carbonation fluids per day. Educated pt on the importance of eating 3 meals/day at regularly scheduled times including breakfast within 1st 1-2 hours of waking. Reviewed working toward 4-6 smaller meals per day, to assist with optimizing protein intake. Suggested patient uses a protein supplement as a meal replacement instead of skipping OR as a between meal high protein snack. Also educated on the importance of including a protein with every meal and snack and reviewed lean sources. Lastly introduced the Plate Method for Meal Planning and reviewed/discussed the importance of limiting and reducing daily carbohydrate intake to 75-100g/day now with an ultimate goal of 30-50g/day pre-op and a very low carbohydrate content post-op. Discussed label reading, tips for measuring carbohydrates, and low carbohydrate- high protein meal and snack ideas. Discussed perceived compliance through recall of knowledge in class.     Comments:  Recommended dietary changes discussed for both before and after surgery Recommendation to follow 1200 calorie diet, working to reduce total carbohydrate intake to  g or less per day and increasing protein intake to  g per day, with no liquids at meal table (for preparation of post-op 30:30 rule recommendation), and 64+ oz  no sugar, no caffeine, no carbonation fluids per day. Recommend pt adopt an exercise routine of at least 3-5 days per week for at least 30 minutes/day. If pt unable to participate in walking, jose l, swimming, or other exercises, recommend pt work with a physical therapist and/or participate in chair exercises, yoga, and/or increase activities of daily living as able. Discussed importance of sampling protein supplements, protein supplement label guidelines and popularly selected items. Handouts provided:    Bariatric Surgery Criteria Packet  [place] [employer] Vit[employer]erals   Post-Op Education Packet   Meal Guide packet   BD Starches guide   Nuts for Nuts? Be Careful!    Protein chart   Protein Content of Foods   \"Carb Counting\"   \"Snack Suggestions\"    Behavior Modification    Identify obstacles to trigger change  Achieving/Rewarding goals met  Positive attitude  Comments: Reinforced importance of modifying lifestyle patterns and behaviors to promote weight loss and long-term weight maintenance. I talked to patient about the importance of taking vitamins post op and we reviewed the vitamins that patients will be taking post op. Encouraged pt to begin taking multivitamin and probiotic pre-op. Pt completed Bariatric-specific nutrition questionnaire. RD reviewed answers and provided feedback on responses that align with bariatric recommendations to behavior changes. Provided feedback on recommendations, areas for improvement, and provided positive feedback on areas where pt is making positive behavior changes. Pt questionnaire is included in chart separate from this note. All current stated pt questions answered. Goals:   1.  Work to increase to 3-4 small meals per day, with planned snacks as needed. Recommend following My Bariatric Plate method for meal planning - focusing on lean protein, non-starchy vegetables, and measured amounts of starch, or 50% protein / 50% non-starchy vegetables by surgery. - Goal of  g protein and  g carbohydrate per day. - Recommend continuing protein supplement as meal replacement at least 1x/day OR as high protein snack option  2. Increase non caloric fluid to 64 oz per day. Eliminate caffeine, added sugar, carbonation, and straws.               -Continue to work to decrease sugar sweetened beverages - goal of calorie free beverages only              -Must eliminate caffeine prior to surgery and avoid for ~6-8 weeks   -Practice 30:30 rule,  food and flood   3. Start activity regimen, work to increase ADL  4. Start Complete MVI and probiotic. Candidate for surgery (per RD): PENDING. Will follow up with pt once she has met with a provider in the [facility] [place] and nutrition education requirements have been assessed by insurance verification personnel. Pt does not have any questions/concerns at this time. RD contact information provided for future nutrition questions or concerns. [x] Pt requested and e-mail sent to pt with support group information for monthly Zoom class.     TAWANDA Becker MS

## 2022-05-11 ENCOUNTER — OFFICE VISIT (OUTPATIENT)
Dept: SURGERY | Age: 42
End: 2022-05-11
Payer: MEDICAID

## 2022-05-11 VITALS
RESPIRATION RATE: 16 BRPM | SYSTOLIC BLOOD PRESSURE: 135 MMHG | WEIGHT: 293 LBS | OXYGEN SATURATION: 100 % | DIASTOLIC BLOOD PRESSURE: 82 MMHG | HEART RATE: 95 BPM | HEIGHT: 72 IN | BODY MASS INDEX: 39.68 KG/M2 | TEMPERATURE: 96.9 F

## 2022-05-11 DIAGNOSIS — G47.30 SLEEP APNEA, UNSPECIFIED TYPE: ICD-10-CM

## 2022-05-11 DIAGNOSIS — R73.03 PREDIABETES: ICD-10-CM

## 2022-05-11 DIAGNOSIS — D50.8 OTHER IRON DEFICIENCY ANEMIA: ICD-10-CM

## 2022-05-11 DIAGNOSIS — Z87.891 SMOKING HISTORY: ICD-10-CM

## 2022-05-11 DIAGNOSIS — E66.01 MORBID OBESITY (HCC): ICD-10-CM

## 2022-05-11 DIAGNOSIS — E66.01 MORBID OBESITY WITH BMI OF 40.0-44.9, ADULT (HCC): Primary | ICD-10-CM

## 2022-05-11 DIAGNOSIS — Z86.16 PERSONAL HISTORY OF COVID-19: ICD-10-CM

## 2022-05-11 DIAGNOSIS — K30 FUNCTIONAL DYSPEPSIA: ICD-10-CM

## 2022-05-11 PROBLEM — Z87.59 HISTORY OF PREGNANCY INDUCED HYPERTENSION: Status: RESOLVED | Noted: 2021-06-07 | Resolved: 2022-05-11

## 2022-05-11 PROBLEM — O99.891 SHORTNESS OF BREATH DURING PREGNANCY: Status: RESOLVED | Noted: 2021-06-07 | Resolved: 2022-05-11

## 2022-05-11 PROBLEM — R09.02 HYPOXEMIA: Status: RESOLVED | Noted: 2021-06-08 | Resolved: 2022-05-11

## 2022-05-11 PROBLEM — R06.81 WITNESSED EPISODE OF APNEA: Status: RESOLVED | Noted: 2021-06-08 | Resolved: 2022-05-11

## 2022-05-11 PROBLEM — M25.471 SWELLING OF BOTH ANKLES: Status: RESOLVED | Noted: 2021-06-07 | Resolved: 2022-05-11

## 2022-05-11 PROBLEM — R06.02 SHORTNESS OF BREATH DURING PREGNANCY: Status: RESOLVED | Noted: 2021-06-07 | Resolved: 2022-05-11

## 2022-05-11 PROBLEM — R06.83 SNORING: Status: RESOLVED | Noted: 2021-06-08 | Resolved: 2022-05-11

## 2022-05-11 PROBLEM — M54.9 BACK PAIN: Status: RESOLVED | Noted: 2021-06-07 | Resolved: 2022-05-11

## 2022-05-11 PROBLEM — R06.01 ORTHOPNEA: Status: RESOLVED | Noted: 2021-06-07 | Resolved: 2022-05-11

## 2022-05-11 PROBLEM — M25.472 SWELLING OF BOTH ANKLES: Status: RESOLVED | Noted: 2021-06-07 | Resolved: 2022-05-11

## 2022-05-11 PROBLEM — O99.019 ANEMIA DURING PREGNANCY: Status: RESOLVED | Noted: 2019-05-15 | Resolved: 2022-05-11

## 2022-05-11 PROBLEM — O36.8131 DECREASED FETAL MOVEMENT AFFECTING MANAGEMENT OF PREGNANCY IN THIRD TRIMESTER, FETUS 1: Status: RESOLVED | Noted: 2021-06-07 | Resolved: 2022-05-11

## 2022-05-11 PROBLEM — O13.3 PREGNANCY INDUCED HYPERTENSION, THIRD TRIMESTER: Status: RESOLVED | Noted: 2021-06-07 | Resolved: 2022-05-11

## 2022-05-11 PROBLEM — O24.419 GESTATIONAL DIABETES: Status: RESOLVED | Noted: 2019-05-13 | Resolved: 2022-05-11

## 2022-05-11 PROBLEM — O36.8190 DECREASED FETAL MOVEMENT: Status: RESOLVED | Noted: 2021-04-14 | Resolved: 2022-05-11

## 2022-05-11 PROCEDURE — 99214 OFFICE O/P EST MOD 30 MIN: CPT | Performed by: SPECIALIST

## 2022-05-11 NOTE — PATIENT INSTRUCTIONS

## 2022-05-11 NOTE — PROGRESS NOTES
Bariatric Surgery Consultation    Subjective: The patient is a 42 y.o. obese female with a Body mass index is 41.76 kg/m². .  The patient is currently her heaviest weight for the past several years. she has been overweight since her teen years. she has been considering surgery since last year. she desires surgery at this time because of multiple health concerns and their lifestyle issues which are hindered by their weight. she has been referred by Dr. Odalis Licona for evaluation and treatment of their obesity via surgical intervention. Saumya Leiva has tried multiple diets in her lifetime most recently tried behavior modification and unsupervised diets    Bariatric comorbidities present are   Patient Active Problem List   Diagnosis Code    Morbid obesity ([facility] [address] E66.01    Morbid obesity with BMI of 40.0-44.9, adult ([facility] [address] E66.01, Z68.41    Prediabetes R73.03    Anemia D64.9    Functional dyspepsia K30    Sleep apnea G47.30    Smoking history Z87.891    Personal history of COVID-19 Z86.16       The patient is considering laparoscopic sleeve gastrectomy for surgical weight loss due to their ineffective progress with medical forms of weight loss and the urging of their physician who cares for their primary medical issues. The patient  now presents  for consideration for weight loss surgery understanding the benefits of this over a medical approach of weight loss as was discussed in our presentation on weight loss surgery. They have discussed their plans both with their family and primary care physician who is in support of their pursuit of such. The patient has had no health issues as of late and denies and gastrointestinal disturbances other than what is outlined below in their review of symptoms. All of their prior evaluations available by both their PCP's and specialists physicians have been reviewed today either in the Care Everywhere portal or scanned under the media tab.     I have spent a large portion of my initial consultation today reviewing the patients current dietary habits which have contributed to their health issues and obesity. I have suggested to them personally a dietary regimen that they can initiate now to help with their status as it pertains to their weight. They understand that the most important aspect of their journey through their weight loss endeavor will be their adherence to a new lifestyle of healthy eating behavior. They also understand that an adherence to an exercise program will not only help with weight loss but is ultimately important in weight maintenance. The patients goal weight is 205 lb. Patient Active Problem List    Diagnosis Date Noted    Functional dyspepsia     Sleep apnea     Smoking history     Personal history of COVID-19     Morbid obesity ([facility] [address]     Morbid obesity with BMI of 40.0-44.9, adult ([facility] [address]     Prediabetes     Anemia      Past Surgical History:   Procedure Laterality Date    HX  SECTION      HX TUBAL LIGATION        Social History     Tobacco Use    Smoking status: Former Smoker     Packs/day: 0.25     Years: 3.00     Pack years: 0.75     Quit date:      Years since quittin.3    Smokeless tobacco: Never Used   Substance Use Topics    Alcohol use: Not Currently      No family history on file. Current Outpatient Medications   Medication Sig Dispense Refill    ferrous sulfate 325 mg (65 mg iron) tablet Take  by mouth Daily (before breakfast).        No Known Allergies     Review of Systems:     General - No history or complaints of unexpected fever, chills, or weight loss  Head/Neck - No history or complaints of headache, diplopia, dysphagia, hearing loss  Cardiac - No history or complaints of chest pain, palpitations, murmur, or shortness of breath  Pulmonary - No history or complaints of shortness of breath, productive cough, hemoptysis  Gastrointestinal - (+) reflux, no abdominal pain, obstipation/constipation or blood per rectum  Genitourinary - No history or complaints of hematuria/dysuria, stress urinary incontinence symptoms, or renal lithiasis  Musculoskeletal - mild joint pain in their knees,  no muscular weakness  Hematologic - No history or complaints of bleeding disorders,  No blood transfusions  Neurologic - No history or complaints of  migraine headaches, seizure activity, syncopal episodes, TIA or stroke  Integumentary - No history or complaints of rashes, abnormal nevi, skin cancer  Gynecological - unremarkable    Objective:     Visit Vitals  /82 (BP 1 Location: Left upper arm, BP Patient Position: Sitting, BP Cuff Size: Large adult long)   Pulse 95   Temp 96.9 °F (36.1 °C)   Resp 16   Ht 6' 1.5\" (1.867 m)   Wt 145.6 kg (320 lb 14.4 oz)   SpO2 100%   BMI 41.76 kg/m²     Physical Examination: General appearance - alert, well appearing, and in no distress  Mental status - alert, oriented to person, place, and time  Eyes - pupils equal and reactive, extraocular eye movements intact  Nose - normal and patent, no erythema, discharge or polyps  Mouth - mucous membranes moist, pharynx normal without lesions  Neck - supple, no significant adenopathy  Lymphatics - no palpable lymphadenopathy, no hepatosplenomegaly  Chest - clear to auscultation, no wheezes, rales or rhonchi, symmetric air entry  Heart - normal rate, regular rhythm, normal S1, S2, no murmurs, rubs, clicks or gallops  Abdomen - soft, nontender, nondistended, no masses or organomegaly  Back exam - full range of motion, no tenderness, palpable spasm or pain on motion  Neurological - alert, oriented, normal speech, no focal findings or movement disorder noted  Musculoskeletal - no joint tenderness, deformity or swelling  Extremities - peripheral pulses normal, no pedal edema, no clubbing or cyanosis  Skin - normal coloration and turgor, no rashes, no suspicious skin lesions noted    Labs:       No results found for this or any previous visit (from the past 1440 hour(s)). Assessment:     Morbid obesity with comorbidity    Plan:     laparoscopic sleeve gastrectomy    This is a 42 y.o. female with a BMI of Body mass index is 41.76 kg/m². and the weight-related co-morbidties as noted below. Lem Hashimoto meets the NIH criteria for bariatric surgery based upon the BMI of Body mass index is 41.76 kg/m². and multiple weight-related co-morbidties. Lem Hashimoto has elected laparoscopic sleeve gastrectomy as her intervention of choice for treatment of morbid obestiy through surgical means secondary to its safety profile, rapid return to work  and decreases in operative risks over gastric bypass. In the office today, following Deyanira's history and physical examination, a 30 minute discussion regarding the anatomic alterations for the laparoscopic sleeve gastrectomy was undertaken. The dietary expectations and the patient and physician dependent factors for success were thoroughly discussed, to include the need for interval follow-up and long-term dietary changes associated with success. The possible complications of the sleeve gastrectomy  were also discussed, to include;death, DVT/PE, staple line leak, bleeding, stricture formation, infection, nutritional deficiencies and sleeve dilation. Specific weight related outcomes for success were also discussed with an emphasis on careful and close follow-up with the first year and eating behavior modification as the baseline and cyclical hunger return. The patient expressed an understanding of the above factors, and her questions were answered in their entirety. In addition, the patient attended a 1.5 hour power point seminar regarding obesity, surgical weight loss including, adjustable gastric band, gastric bypass, and sleeve gastrectomy. This discussion contrasted the different surgical techniques, mechanisms of actions and expected outcomes, and surgical and medical risks associated with each procedure.   During this seminar, there was a long question and answer session where each questions was answered until there were no additional questions. Today, the patient had all of her questions answered and desires to proceed with  bariatric surgery initially choosing sleeve gastrectomy as her surgical option. No labs ordered today as she has labs in Care Everywhere    Hgb of 9.0 - she has an appt with [facility] later this month as arranged by her PCP    Secondary Diagnoses:     Dietary Intervention  - The patient is currently scheduled to see or has been followed by a bariatric nutritionist for an attempt at preoperative weight loss as has been dictated by their insurance carrier. They will be assessed at various times during their follow up to evaluate their progress depending on the length of time that is required once again by their carrier. I have explained the importance of preoperative weight loss and the benefits regarding lower surgical risk and also assisting the patient in reaching their weight loss goal.  Finally they understand their is a physiologic benefit from the standpoint of hepatic volume reduction preoperatively. I have reiterated the importance of a low carbohydrate and high protein regimen to achieve their stated goal.     GERD -The patient understands that weight loss surgery is not a guaranteed cure for reflux disease but does understand the benefits that weight loss can have on reflux disease. They also understand that at the time of surgery the gastroesophageal junction will be evaluated for the presence of a diaphragmatic hernia. Hernias will be corrected always with the gastric band and sleeve gastrectomy procedures, but only on a case by case basis with the gastric bypass if it prevents our ability to perform the operation at hand, or if I feel that they would benefit long term with correction of this issue.   The patient also understands that neither weight loss surgery nor repair of a diaphragmatic hernia repair guarantees the complete cessation of the disease. They also understand there is a possibility of recurrence with a simple crural repair as is performed with these procedures. They understand they may have to continue their medications in the postoperative period. They have a good understanding that the gastric bypass procedure is better suited to total resolution of this issue and that neither the Lap Band nor sleeve gastrectomy is considered a curative procedure as it pertains to this diagnosis. Obstructive Sleep Apnea -The patient understands the association of sleep apnea and obesity and the additional risk that it caries related to post surgical complications. We will have the patient bring their CPAP machine to the hospital for use both postoperatively in the PACU and on the floor at its appropriate setting.  We will have them continue using it while at home after surgery and follow up with their pulmonologist 6 months after to be retested to see if it can be discontinued at that time period.     Signed By: Leny Murphy MD     May 11, 2022

## 2022-05-24 ENCOUNTER — HOSPITAL ENCOUNTER (OUTPATIENT)
Dept: BARIATRICS/WEIGHT MGMT | Age: 42
Discharge: HOME | End: 2022-05-24

## 2022-05-24 VITALS — WEIGHT: 293 LBS | BODY MASS INDEX: 39.68 KG/M2 | HEIGHT: 72 IN

## 2022-05-24 NOTE — PROGRESS NOTES
Medical Weight Loss Multi-Disciplinary Program    Patient's Name: Jimena Bird   Age: 42 y.o. YOB: 1980   Sex: female    Session #2. Pt attended in-person class. Weight obtained in office. Date: 5/24/2022    Visit Vitals  Ht 6' 1.5\" (1.867 m)   Wt 144.2 kg (318 lb)   BMI 41.39 kg/m²     Starting Weight: 320.9 lbs   Previous Months Weight: 314.6 lbs  Overall Pounds Lost: 2.9 lbs  Overall Pounds Gained: 0 lbs    Do you smoke? No    Alcohol intake:  Number of drinks per week: 0    Class Guidelines    Guidelines are reviewed with patient at the start of every class. 1. Patient understands that weight loss trial classes must be consecutive. Patient understands if they miss a class, it is their responsibility to contact me to reschedule class. I will reach out to patient after their first no show. 2.  Patient understands the expectations that weight maintenance/weight loss is expected during the classes. Failure to demonstrate changes may result in extension of weight loss trial, followed by returning to see the surgeon. Patient understands that they CANNOT gain any weight during the weight loss trial.  Gaining weight will result in extension of weight loss trial.  3. Patient is also instructed to complete their labwork, psychological evaluation visit, and any other tests that the surgeon has used while they are working on their weight loss trial.  4.  Patient was instructed to bring their packet of nutrition education materials to every class and appointment. Other Pertinent Information    Changes Made Since Last Class:  \"no starch, exercise 3x/wk for 15min. \"    Eating Habits and Behaviors      Today in class we reviewed the Key Diet Principles. Patient was encouraged to consume 3 meals each day, and the timing the meals was reviewed. Meal time behaviors that will help pt to be successful with their weight loss efforts were additionally reviewed.       We discussed the importance of drinking adequate amounts of fluids, recommending that patient consume a minimum of 64 oz of sugar-free, caffeine-free and carbonation-free fluids each day. Patient was encouraged to eliminate sugar-sweetened beverages such as sweet tea, fruit juice, fruit smoothies, flavored coffee drinks and regular sodas. During the weight loss trial, patient was encouraged to focus on eating protein-forward meals, with a daily goal of  grams protein. Patient was also advised to decrease carbohydrate intake to <100 g/d, choosing complex vs. simple carbohydrates in limited amounts. We also discussed limiting fat intake. Encouraged patient to follow the \"3-gram rule\" when choosing foods by looking for items containing <3 g of fat and sugar per serving. We reviewed meal planning guidelines and discussed appropriate meal and snack options. We also talked about protein drinks and patient was encouraged to start trying these, using them either for a meal replacement or a protein-rich snack. We reviewed the nutritional guidelines for selecting protein shakes. Pre-operative vitamin and mineral supplementation were reviewed. Patient was instructed to choose chewable complete multivitamin with iron in NON-gummy form. Selection of probiotic was reviewed. Patient's current diet habits include:  Patient is eating 2 meals per day. Patient is not measuring portions out. Patient indicates they are eating out 0 times per week. This includes fast food, carry out, and sit down restaurants. Physical Activity/Exercise    Comments:  Patient is currently walking for activity 3d/wk. We talked about recommended types of physical activity, including walking, swimming, cycling, or chair exercises. I also talked with patient about doing some strength training, which helps the metabolism, as well as strengthen muscles and bones.   Patient's plan to incorporate more activity includes: \"my goal is to exercise daily\". Behavior Modification     Comments:  During today's class, I discussed vitamin and mineral supplementation essential for health and prevention of malnutrition in depth. Patient was directed to the handouts on vitamins and minerals found on pages 27-33 that were provided in class handouts packet as well as a handout titled, \"Nutrient Deficiency and it's symptoms\". I reviewed the vitamins and minerals that need to be supplemented, dosage recommendations, functions of supplements and signs of deficiencies, as well as examples of specific supplements. Reviewed briefly the requirement  differences between laparoscopic gastric bypass, laparoscopic sleeve gastrectomy, and lap-band procedures, while encouraging all patients to continue supplements for life no matter what bariatric surgery they are preparing for. Goals:   1. Work to increase to 3-4 small meals per day, with 1-2 planned snacks as needed. Recommend following the plate method for meal planning - focusing on lean protein, non-starchy vegetables, and measured amounts of complex carbohydrates. - Goal of  g protein and <100 g carbohydrates per day. - Select at least 2 DIFFERENT protein supplements that can be used for protein supplementation to meet goals pre- and post-operatively. - Practice Carbohydrate Counting and label reading.   - Follow 3 g rule for fat and sugar. 2. Slow down meals  - Chew each bite 25-35 times. - Aim for 20-30 min/meal.  3. Increase non-caloric fluids to 64 oz per day. Eliminate caffeine, added sugar, carbonation, and straws.               - Continue to work to decrease sugar sweetened beverages - goal of calorie-free beverages only. - Must eliminate caffeine prior to surgery and avoid for ~6-8 weeks. - Practice 30:30 rule,  food and fluid intake. 4. Start activity regimen, work to increase ADLs.   5. Start Complete MVI and probiotic at least 30 days pre-op. Candidate for surgery (per RD): YES - Pt acknowledges understanding that bariatric surgery requires lifelong adherence to dietary and exercise behavior change recommendations in order to be successful with weight loss and long-term weight maintenance once weight loss goal is met. Pt demonstrates understanding of post-op key diet principles and recommendations through recall and class discussion. Pt passed bariatric surgery nutrition quiz with at least 80% pass rate.

## 2022-06-01 ENCOUNTER — APPOINTMENT (OUTPATIENT)
Dept: SURGERY | Age: 42
End: 2022-06-01

## 2022-06-01 ENCOUNTER — APPOINTMENT (OUTPATIENT)
Dept: GENERAL RADIOLOGY | Age: 42
End: 2022-06-01
Attending: SPECIALIST
Payer: MEDICAID

## 2022-06-01 ENCOUNTER — HOSPITAL ENCOUNTER (OUTPATIENT)
Age: 42
Setting detail: OUTPATIENT SURGERY
Discharge: HOME | End: 2022-06-01
Attending: SPECIALIST | Admitting: SPECIALIST
Payer: MEDICAID

## 2022-06-01 VITALS
OXYGEN SATURATION: 100 % | HEART RATE: 95 BPM | TEMPERATURE: 97.8 F | WEIGHT: 293 LBS | HEIGHT: 72 IN | DIASTOLIC BLOOD PRESSURE: 96 MMHG | SYSTOLIC BLOOD PRESSURE: 145 MMHG | BODY MASS INDEX: 39.68 KG/M2 | RESPIRATION RATE: 18 BRPM

## 2022-06-01 DIAGNOSIS — E66.01 MORBID OBESITY (HCC): ICD-10-CM

## 2022-06-01 DIAGNOSIS — K30 FUNCTIONAL DYSPEPSIA: ICD-10-CM

## 2022-06-01 PROCEDURE — 74240 X-RAY XM UPR GI TRC 1CNTRST: CPT

## 2022-06-01 PROCEDURE — 76040000019: Performed by: SPECIALIST

## 2022-06-01 PROCEDURE — 74011000250 HC RX REV CODE- 250: Performed by: SPECIALIST

## 2022-06-01 PROCEDURE — 74240 X-RAY XM UPR GI TRC 1CNTRST: CPT | Performed by: SPECIALIST

## 2022-06-01 NOTE — PROCEDURES
Patient:Deyanira Perez   : 1980  Medical Record [id number]            PREPROCEDURE DIAGNOSIS: This patient is preoperative for laparoscopic sleeve gastrectomy procedure with a history of  reflux disease. POSTPROCEDURE DIAGNOSIS: This patient is preoperative for laparoscopic sleeve gastrectomy procedure with a history of  reflux disease. PROCEDURES PERFORMED: Upper GI study with barium. ESTIMATED BLOOD LOSS: None. SPECIMENS: None. STATEMENT OF MEDICAL NECESSITY: The patient is a patient with a  longstanding history of obesity. They are now considering the laparoscopic sleeve gastrectomy procedure as a means of surgical weight control and due to their history of reflux disease and are being assessed preoperatively for such. DESCRIPTION OF PROCEDURE: The patient was brought to the fluoroscopy unit and  was given thin barium. On swallowing of barium, they were noted to have  normal peristalsis of their esophagus. They had prompt filling of distal  esophagus with tapering into the gastroesophageal junction. There was no evidence of a hiatal hernia present. Contrast then filled the gastric cardia, fundus,body and pre pyloric region with no abnormalities noted. Contrast then exited the pylorus in normal fashion. No obstruction was noted. There was no evidence of reflux noted.     (normal anatomy)    Karl Luna MD

## 2022-06-29 ENCOUNTER — HOSPITAL ENCOUNTER (OUTPATIENT)
Dept: BARIATRICS/WEIGHT MGMT | Age: 42
Discharge: HOME | End: 2022-06-29

## 2022-06-29 VITALS — BODY MASS INDEX: 39.68 KG/M2 | WEIGHT: 293 LBS | HEIGHT: 72 IN

## 2022-06-29 NOTE — PROGRESS NOTES
Medical Weight Loss Multi-Disciplinary Program    Patient's Name: Dallas Ruelas  Age: 42 y.o. YOB: 1980   Sex: female    Session #3. Pt attended in-person class. Weight obtained in office. Date: 6/29/2022    Visit Vitals  Ht 6' 1\" (1.854 m)   Wt 146.3 kg (322 lb 8 oz)   BMI 42.55 kg/m²       Pounds Lost since last month: 0 lbs  Pounds Gained since last month: 4.5 lbs    Starting Weight: 320.9 lbs   Previous Months Weight: 318 lbs  Overall Pounds Lost: 0 lbs   Overall Pounds Gained: 1.9 lbs      Do you smoke? No    Alcohol intake:  Number of drinks per week: 0    Class Guidelines    Guidelines are reviewed with patient at the start of every class. 1. Patient understands that weight loss trial classes must be consecutive. Patient understands if they miss a class, it is their responsibility to contact me to reschedule class. I will reach out to patient after their first no show. 2.  Patient understands the expectations that weight maintenance/weight loss is expected during the classes. Failure to demonstrate changes may result in extension of weight loss trial, followed by returning to see the surgeon. Patient understands that they CANNOT gain any weight during the weight loss trial.  Gaining weight will result in extension of weight loss trial.  3. Patient is also instructed to complete their labwork, psychological evaluation visit, and any other tests that the surgeon has used while they are working on their weight loss trial.  4.  Patient was instructed to bring their packet of nutrition education materials to every class and appointment. Other Pertinent Information    Changes Made Since Last Class: \"smaller portions and exercise. \"    Eating Habits and Behaviors      Today in class we reviewed the Key Diet Principles. Patient was encouraged to consume 3 meals each day, and meal timing was reviewed.   Meal time behaviors that will help pt to be successful with their weight loss efforts were also discussed. We discussed the importance of drinking adequate amounts of fluids, recommending that patient consume a minimum of 64 oz of sugar-free, caffeine-free and carbonation-free fluids each day. Patient was encouraged to eliminate sugar-sweetened beverages such as sweet tea, fruit juice, fruit smoothies, flavored coffee drinks and regular sodas. During the weight loss trial, patient was encouraged to focus on eating protein-forward meals, with a daily goal of  grams protein. Patient was also advised to decrease carbohydrate intake to <100 g/d, choosing complex vs. simple carbohydrates in limited amounts. We also discussed limiting fat intake. Encouraged patient to follow the \"3-gram rule\" when choosing foods by looking for items containing <3 g of fat and sugar per serving. We reviewed meal planning guidelines and discussed appropriate meal and snack options. We also talked about appropriate protein drinks and patient was encouraged to start trying these, using them either for a meal replacement or a protein-rich snack. We reviewed the nutritional guidelines for selecting protein shakes. Pre-operative vitamin and mineral supplementation was reviewed. Patient was instructed to choose a chewable complete multivitamin with iron in NON-gummy form. Selection of probiotic was also reviewed. Patient's current diet habits include:  Patient is eating 3 meals per day. Patient is not measuring portions out. Patient indicates they are eating out 0 times per week. This includes fast food, carry out, and sit down restaurants. Physical Activity/Exercise    Comments:  Patient is currently riding a stationary bike for activity 15min/d x 3d/wk. We talked about recommended types of physical activity, including walking, swimming, cycling, or chair exercises.   I also talked with patient about doing some strength training, which helps the metabolism, as well as strengthen muscles and bones. Patient's plan to incorporate more activity includes: none stated      Behavior Modification     Comments:  During today's class, we discussed setting SMART goals as behavior change tool to improve eating behaviors, promote weight loss and long-term weight maintenance, encourage a healthy relationship with food, and prevent chronic disease. Patient was directed to the handouts on developing SMART goals that were provided in class, and each component of the acronym SMART was reviewed. Examples of appropriate SMART goals were provided and patient was instructed to use the worksheet provided to develop their own SMART goal related to nutrition and physical activity. Goals:   1. Work to increase to 3-4 small meals per day, with 1-2 planned snacks as needed. Recommend following the plate method for meal planning - focusing on lean protein, non-starchy vegetables, and measured amounts of complex carbohydrates. - Goal of  g protein and <100 g carbohydrates per day. - Select at least 2 DIFFERENT protein supplements that can be used for protein supplementation to meet goals pre- and post-operatively. - Practice Carbohydrate Counting and label reading.   - Follow 3 g rule for fat and sugar. 2. Slow down meals  - Chew each bite 25-35 times. - Aim for 20-30 min/meal.  3. Increase non-caloric fluids to 64 oz per day. Eliminate caffeine, added sugar, carbonation, and straws.               - Continue to work to decrease sugar sweetened beverages - goal of calorie-free beverages only. - Must eliminate caffeine prior to surgery and avoid for ~6-8 weeks. - Practice 30:30 rule,  food and fluid intake. 4. Start activity regimen, work to increase ADLs. 5. Start Complete MVI and probiotic at least 30 days pre-op.     Candidate for surgery (per RD):  Pt has met insurance requirements, however, I recommend that pt returns to nutrition clinic in 1 mo for weight check since pt has gained 4.5 lbs in the past month. This will give pt time to put the nutritional goals set today for weight loss into practice.

## 2023-05-11 ENCOUNTER — HOSPITAL ENCOUNTER (OUTPATIENT)
Facility: HOSPITAL | Age: 43
Discharge: HOME | End: 2023-05-14

## 2023-05-11 VITALS — WEIGHT: 293 LBS | HEIGHT: 72 IN | BODY MASS INDEX: 39.68 KG/M2

## 2023-05-12 ENCOUNTER — OFFICE VISIT (OUTPATIENT)
Age: 43
End: 2023-05-12
Payer: MEDICAID

## 2023-05-12 VITALS
HEART RATE: 87 BPM | DIASTOLIC BLOOD PRESSURE: 87 MMHG | OXYGEN SATURATION: 100 % | BODY MASS INDEX: 39.68 KG/M2 | SYSTOLIC BLOOD PRESSURE: 136 MMHG | TEMPERATURE: 97.5 F | WEIGHT: 293 LBS | HEIGHT: 72 IN

## 2023-05-12 DIAGNOSIS — Z87.891 SMOKING HISTORY: ICD-10-CM

## 2023-05-12 DIAGNOSIS — R73.03 PREDIABETES: ICD-10-CM

## 2023-05-12 DIAGNOSIS — E66.01 MORBID OBESITY (HCC): Primary | ICD-10-CM

## 2023-05-12 DIAGNOSIS — D64.9 ANEMIA, UNSPECIFIED TYPE: ICD-10-CM

## 2023-05-12 DIAGNOSIS — E66.01 MORBID OBESITY WITH BMI OF 40.0-44.9, ADULT (HCC): ICD-10-CM

## 2023-05-12 DIAGNOSIS — K30 FUNCTIONAL DYSPEPSIA: ICD-10-CM

## 2023-05-12 DIAGNOSIS — G47.30 SLEEP APNEA, UNSPECIFIED TYPE: ICD-10-CM

## 2023-05-12 PROCEDURE — 99214 OFFICE O/P EST MOD 30 MIN: CPT | Performed by: SPECIALIST

## 2023-05-12 ASSESSMENT — PATIENT HEALTH QUESTIONNAIRE - PHQ9
SUM OF ALL RESPONSES TO PHQ QUESTIONS 1-9: 0
SUM OF ALL RESPONSES TO PHQ QUESTIONS 1-9: 0
1. LITTLE INTEREST OR PLEASURE IN DOING THINGS: 0
SUM OF ALL RESPONSES TO PHQ QUESTIONS 1-9: 0
SUM OF ALL RESPONSES TO PHQ9 QUESTIONS 1 & 2: 0
SUM OF ALL RESPONSES TO PHQ QUESTIONS 1-9: 0
2. FEELING DOWN, DEPRESSED OR HOPELESS: 0

## 2023-05-12 NOTE — PROGRESS NOTES
patients current dietary habits which have contributed to their health issues and obesity. I have suggested to them personally a dietary regimen that they can initiate now to help with their status as it pertains to their weight. They understand that the most important aspect of their journey through their weight loss endeavor will be their adherence to a new lifestyle of healthy eating behavior. They also understand that an adherence to an exercise program will not only help with weight loss but is ultimately important in weight maintenance. The patients goal weight is 190lb. Patient Active Problem List    Diagnosis Date Noted    Functional dyspepsia     Sleep apnea     Smoking history     Personal history of COVID-19     Morbid obesity with BMI of 40.0-44.9, adult ([facility] [address]     Morbid obesity ([facility] [address]     Prediabetes     Anemia      Past Medical History:   Diagnosis Date    Anemia     PCP sending to hematology - planned for May  2022 - [facility]    Functional dyspepsia     avoids trigger foods    Morbid obesity ([facility] [address]     Morbid obesity with BMI of 40.0-44.9, adult [facility])     Personal history of COVID-19     summer of 2021 during a pregnancy    Prediabetes     HgbA1c 6.1 in April 2022    Sleep apnea     CPAP    Smoking history     quit summer 2021     Past Surgical History:   Procedure Laterality Date    TUBAL LIGATION         family history is not on file. Current Outpatient Medications   Medication Sig Dispense Refill    ferrous sulfate (IRON 325) 325 (65 Fe) MG tablet Take by mouth every morning (before breakfast)       No current facility-administered medications for this visit. Patient has no known allergies.      Review of Systems:     General - No history or complaints of unexpected fever, chills, or weight loss  Head/Neck - No history or complaints of headache, diplopia, dysphagia, hearing loss  Cardiac - No history or complaints of chest pain, palpitations, murmur, or shortness of breath  Pulmonary - No

## 2023-06-10 NOTE — PROGRESS NOTES
0710 Bedside and Verbal shift change report given to MARTHA Alvarez RN (oncoming nurse) by JAN Santo RN (offgoing nurse). Report included the following information SBAR, Kardex, Intake/Output, MAR and Recent Results. Nika 66 on unit to call for [employer] 1120 Patient transferred via wheelchair to [facility]

## 2023-06-23 DIAGNOSIS — E66.01 MORBID OBESITY (HCC): ICD-10-CM

## 2023-06-23 DIAGNOSIS — Z01.812 PRE-OPERATIVE LABORATORY EXAMINATION: ICD-10-CM

## 2023-06-23 DIAGNOSIS — Z87.891 SMOKING HISTORY: ICD-10-CM

## 2023-06-23 DIAGNOSIS — G47.30 SLEEP APNEA, UNSPECIFIED TYPE: ICD-10-CM

## 2023-06-23 DIAGNOSIS — D50.0 IRON DEFICIENCY ANEMIA DUE TO CHRONIC BLOOD LOSS: ICD-10-CM

## 2023-06-23 DIAGNOSIS — E66.01 MORBID OBESITY (HCC): Primary | ICD-10-CM

## 2023-06-23 DIAGNOSIS — Z87.891 SMOKING HISTORY: Primary | ICD-10-CM

## 2023-07-03 ENCOUNTER — CLINICAL DOCUMENTATION (OUTPATIENT)
Age: 43
End: 2023-07-03

## 2023-07-03 ENCOUNTER — HOSPITAL ENCOUNTER (OUTPATIENT)
Facility: HOSPITAL | Age: 43
Discharge: HOME | End: 2023-07-06

## 2023-07-03 RX ORDER — ONDANSETRON HYDROCHLORIDE 8 MG/1
8 TABLET, FILM COATED ORAL EVERY 8 HOURS PRN
Qty: 30 TABLET | Refills: 0 | Status: SHIPPED | OUTPATIENT
Start: 2023-07-03

## 2023-07-03 RX ORDER — ENOXAPARIN SODIUM 100 MG/ML
INJECTION SUBCUTANEOUS
Qty: 20 EACH | Refills: 0 | Status: SHIPPED | OUTPATIENT
Start: 2023-07-03 | End: 2023-07-12

## 2023-07-03 NOTE — PROGRESS NOTES
foods, patient will need 40-60 g/d protein from supplemental shakes to meet the total daily intake goal of  g/d protein. Patient understands the importance of being compliant with the diet and vitamin/mineral protocols, as well as the complications and risks that can occur if they are non-compliant. Patient has also been educated on the pre-op clear liquid diet protocol for 1 day prior to surgery. Patient understands that failure to comply with following the pre-op clear liquid diet could result in surgery being canceled. Patient's virtual appointment for 2 week post-op nutrition education has been scheduled. At this 2 week post-op visit, RD will assess how patient is tolerating liquids. If patient is tolerating liquid diet without difficulty, RD will recommend advancement of patient's diet to soft/moist (puree) diet to provider. Patient will also see RD again at 1-month post-op to assess tolerance of soft/moist diet and advance to soft protein with soft vegetables, if soft/moist diet is tolerated without difficulty. RD will assess patient's compliance with current protocol, including diet, vitamins/minerals, protein shakes, and physical activity. Post-op diet guidelines will be reinforced. Patient provided with RD contact information, and RD is available for questions and to meet with patient outside of the 2 week and 1 month post-op visit.      Candidate for surgery: Yes     Shira Escobar RD

## 2023-07-31 ENCOUNTER — HOSPITAL ENCOUNTER (OUTPATIENT)
Facility: HOSPITAL | Age: 43
Discharge: HOME | End: 2023-08-03

## 2023-07-31 ENCOUNTER — OFFICE VISIT (OUTPATIENT)
Age: 43
End: 2023-07-31
Payer: MEDICAID

## 2023-07-31 ENCOUNTER — HOSPITAL ENCOUNTER (OUTPATIENT)
Facility: HOSPITAL | Age: 43
Discharge: HOME | End: 2023-08-02
Payer: MEDICAID

## 2023-07-31 VITALS
OXYGEN SATURATION: 99 % | SYSTOLIC BLOOD PRESSURE: 138 MMHG | WEIGHT: 293 LBS | BODY MASS INDEX: 39.68 KG/M2 | DIASTOLIC BLOOD PRESSURE: 81 MMHG | HEIGHT: 72 IN | HEART RATE: 89 BPM | TEMPERATURE: 98.7 F

## 2023-07-31 DIAGNOSIS — Z01.812 PRE-OPERATIVE LABORATORY EXAMINATION: ICD-10-CM

## 2023-07-31 DIAGNOSIS — G47.30 SLEEP APNEA, UNSPECIFIED TYPE: ICD-10-CM

## 2023-07-31 DIAGNOSIS — Z87.891 SMOKING HISTORY: ICD-10-CM

## 2023-07-31 DIAGNOSIS — E66.01 MORBID OBESITY (HCC): ICD-10-CM

## 2023-07-31 DIAGNOSIS — E66.01 MORBID OBESITY WITH BMI OF 40.0-44.9, ADULT (HCC): ICD-10-CM

## 2023-07-31 DIAGNOSIS — R73.03 PREDIABETES: ICD-10-CM

## 2023-07-31 DIAGNOSIS — D50.0 IRON DEFICIENCY ANEMIA DUE TO CHRONIC BLOOD LOSS: ICD-10-CM

## 2023-07-31 DIAGNOSIS — E66.01 MORBID OBESITY (HCC): Primary | ICD-10-CM

## 2023-07-31 DIAGNOSIS — K30 FUNCTIONAL DYSPEPSIA: ICD-10-CM

## 2023-07-31 LAB
EKG ATRIAL RATE: 82 BPM
EKG DIAGNOSIS: NORMAL
EKG P AXIS: 73 DEGREES
EKG P-R INTERVAL: 172 MS
EKG Q-T INTERVAL: 406 MS
EKG QRS DURATION: 84 MS
EKG QTC CALCULATION (BAZETT): 474 MS
EKG R AXIS: 57 DEGREES
EKG T AXIS: 56 DEGREES
EKG VENTRICULAR RATE: 82 BPM
LABCORP SPECIMEN COLLECTION: NORMAL

## 2023-07-31 PROCEDURE — 93005 ELECTROCARDIOGRAM TRACING: CPT

## 2023-07-31 PROCEDURE — 99215 OFFICE O/P EST HI 40 MIN: CPT | Performed by: SPECIALIST

## 2023-07-31 RX ORDER — FLUTICASONE PROPIONATE 50 MCG
SPRAY, SUSPENSION (ML) NASAL
COMMUNITY
End: 2023-07-31 | Stop reason: ALTCHOICE

## 2023-07-31 RX ORDER — PYRIDOXINE HCL (VITAMIN B6) 25 MG
1 TABLET ORAL 3 TIMES DAILY
COMMUNITY
End: 2023-07-31 | Stop reason: ALTCHOICE

## 2023-07-31 RX ORDER — ONDANSETRON 8 MG/1
8 TABLET, ORALLY DISINTEGRATING ORAL EVERY 8 HOURS PRN
Qty: 30 TABLET | Refills: 0 | Status: SHIPPED | OUTPATIENT
Start: 2023-07-31

## 2023-07-31 ASSESSMENT — PATIENT HEALTH QUESTIONNAIRE - PHQ9
1. LITTLE INTEREST OR PLEASURE IN DOING THINGS: 0
SUM OF ALL RESPONSES TO PHQ9 QUESTIONS 1 & 2: 0
2. FEELING DOWN, DEPRESSED OR HOPELESS: 0
SUM OF ALL RESPONSES TO PHQ QUESTIONS 1-9: 0

## 2023-07-31 NOTE — H&P (VIEW-ONLY)
Currently they understand that this likely is the single most important item that they need to control in the perioperative process. They understand that the [facility] protocol is that patients entering the operative suite should have an A1c less than 8.5 prior to surgery. Based on Accu-Chek readings this would mean that there daily Accu-Cheks are in the 180 range or less. The patient has duane given a very low carbohydrate diet preoperatively along with instructions to monitor their blood sugars on a regular daily basis. When  their surgery is performed  we will be monitoring the patient with sliding scale insulin and accuchecks. Based on those values we will determine whether the patient needs a reduction of those medications postoperatively or total removal of those medications on discharge. We will have the patient continue acuchecks postoperatively while at home also and report to me or their family physician for appropriate adjustments as needed. The patient also understands that in the event of uncontrolled blood sugar preoperatively that we may choose to postpone their surgery.      Linda Acosta MD  7/31/2023    Total time spent with the patient was 45 minutes

## 2023-08-01 LAB
ABO GROUP BLD: NORMAL
ALBUMIN SERPL-MCNC: 4.3 G/DL (ref 3.9–4.9)
ALBUMIN/GLOB SERPL: 1.3 {RATIO} (ref 1.2–2.2)
ALP SERPL-CCNC: 80 IU/L (ref 44–121)
ALT SERPL-CCNC: 19 IU/L (ref 0–32)
AST SERPL-CCNC: 15 IU/L (ref 0–40)
B-HCG SERPL QL: NEGATIVE MIU/ML
BASOPHILS # BLD AUTO: 0 X10E3/UL (ref 0–0.2)
BASOPHILS NFR BLD AUTO: 1 %
BILIRUB SERPL-MCNC: 0.2 MG/DL (ref 0–1.2)
BLD GP AB SCN SERPL QL: NEGATIVE
BUN SERPL-MCNC: 15 MG/DL (ref 6–24)
BUN/CREAT SERPL: 22 (ref 9–23)
CALCIUM SERPL-MCNC: 9.2 MG/DL (ref 8.7–10.2)
CHLORIDE SERPL-SCNC: 101 MMOL/L (ref 96–106)
CO2 SERPL-SCNC: 23 MMOL/L (ref 20–29)
COTININE UR QL SCN: NEGATIVE NG/ML
CREAT SERPL-MCNC: 0.69 MG/DL (ref 0.57–1)
EGFRCR SERPLBLD CKD-EPI 2021: 110 ML/MIN/1.73
EOSINOPHIL # BLD AUTO: 0.2 X10E3/UL (ref 0–0.4)
EOSINOPHIL NFR BLD AUTO: 3 %
ERYTHROCYTE [DISTWIDTH] IN BLOOD BY AUTOMATED COUNT: 19.7 % (ref 11.7–15.4)
GLOBULIN SER CALC-MCNC: 3.3 G/DL (ref 1.5–4.5)
GLUCOSE SERPL-MCNC: 89 MG/DL (ref 70–99)
HCT VFR BLD AUTO: 36 % (ref 34–46.6)
HGB BLD-MCNC: 11.1 G/DL (ref 11.1–15.9)
IMM GRANULOCYTES # BLD AUTO: 0 X10E3/UL (ref 0–0.1)
IMM GRANULOCYTES NFR BLD AUTO: 0 %
LYMPHOCYTES # BLD AUTO: 2.1 X10E3/UL (ref 0.7–3.1)
LYMPHOCYTES NFR BLD AUTO: 35 %
Lab: NORMAL
MCH RBC QN AUTO: 24.8 PG (ref 26.6–33)
MCHC RBC AUTO-ENTMCNC: 30.8 G/DL (ref 31.5–35.7)
MCV RBC AUTO: 80 FL (ref 79–97)
MONOCYTES # BLD AUTO: 0.3 X10E3/UL (ref 0.1–0.9)
MONOCYTES NFR BLD AUTO: 5 %
NEUTROPHILS # BLD AUTO: 3.5 X10E3/UL (ref 1.4–7)
NEUTROPHILS NFR BLD AUTO: 56 %
PLATELET # BLD AUTO: 316 X10E3/UL (ref 150–450)
POTASSIUM SERPL-SCNC: 4.1 MMOL/L (ref 3.5–5.2)
PROT SERPL-MCNC: 7.6 G/DL (ref 6–8.5)
RBC # BLD AUTO: 4.48 X10E6/UL (ref 3.77–5.28)
RH BLD: NEGATIVE
SODIUM SERPL-SCNC: 140 MMOL/L (ref 134–144)
WBC # BLD AUTO: 6.1 X10E3/UL (ref 3.4–10.8)

## 2023-08-14 ENCOUNTER — ANESTHESIA EVENT (OUTPATIENT)
Facility: HOSPITAL | Age: 43
DRG: 403 | End: 2023-08-14
Payer: MEDICAID

## 2023-08-15 ENCOUNTER — HOSPITAL ENCOUNTER (INPATIENT)
Facility: HOSPITAL | Age: 43
LOS: 1 days | Discharge: HOME | DRG: 403 | End: 2023-08-16
Attending: SPECIALIST | Admitting: SPECIALIST
Payer: MEDICAID

## 2023-08-15 ENCOUNTER — ANESTHESIA (OUTPATIENT)
Facility: HOSPITAL | Age: 43
DRG: 403 | End: 2023-08-15
Payer: MEDICAID

## 2023-08-15 PROBLEM — E66.01 MORBID OBESITY WITH BODY MASS INDEX (BMI) OF 40.0 OR HIGHER (HCC): Status: ACTIVE | Noted: 2023-08-15

## 2023-08-15 LAB
ABO + RH BLD: NORMAL
BLOOD GROUP ANTIBODIES SERPL: NORMAL
HCG UR QL: NEGATIVE
SPECIMEN EXP DATE BLD: NORMAL

## 2023-08-15 PROCEDURE — 2500000003 HC RX 250 WO HCPCS: Performed by: SPECIALIST

## 2023-08-15 PROCEDURE — 2580000003 HC RX 258: Performed by: SPECIALIST

## 2023-08-15 PROCEDURE — C9113 INJ PANTOPRAZOLE SODIUM, VIA: HCPCS | Performed by: SPECIALIST

## 2023-08-15 PROCEDURE — 1100000000 HC RM PRIVATE

## 2023-08-15 PROCEDURE — 6360000002 HC RX W HCPCS: Performed by: SPECIALIST

## 2023-08-15 PROCEDURE — C1713 ANCHOR/SCREW BN/BN,TIS/BN: HCPCS | Performed by: SPECIALIST

## 2023-08-15 PROCEDURE — 0DB64Z3 EXCISION OF STOMACH, PERCUTANEOUS ENDOSCOPIC APPROACH, VERTICAL: ICD-10-PCS | Performed by: SPECIALIST

## 2023-08-15 PROCEDURE — 2580000003 HC RX 258: Performed by: ANESTHESIOLOGY

## 2023-08-15 PROCEDURE — 6360000002 HC RX W HCPCS: Performed by: NURSE ANESTHETIST, CERTIFIED REGISTERED

## 2023-08-15 PROCEDURE — 86900 BLOOD TYPING SEROLOGIC ABO: CPT

## 2023-08-15 PROCEDURE — 0DB78ZX EXCISION OF STOMACH, PYLORUS, VIA NATURAL OR ARTIFICIAL OPENING ENDOSCOPIC, DIAGNOSTIC: ICD-10-PCS | Performed by: SPECIALIST

## 2023-08-15 PROCEDURE — A4216 STERILE WATER/SALINE, 10 ML: HCPCS | Performed by: SPECIALIST

## 2023-08-15 PROCEDURE — 2709999900 HC NON-CHARGEABLE SUPPLY: Performed by: SPECIALIST

## 2023-08-15 PROCEDURE — 7100000000 HC PACU RECOVERY - FIRST 15 MIN: Performed by: SPECIALIST

## 2023-08-15 PROCEDURE — 88305 TISSUE EXAM BY PATHOLOGIST: CPT

## 2023-08-15 PROCEDURE — 7100000001 HC PACU RECOVERY - ADDTL 15 MIN: Performed by: SPECIALIST

## 2023-08-15 PROCEDURE — 3600000015 HC SURGERY LEVEL 5 ADDTL 15MIN: Performed by: SPECIALIST

## 2023-08-15 PROCEDURE — 2720000010 HC SURG SUPPLY STERILE: Performed by: SPECIALIST

## 2023-08-15 PROCEDURE — 88307 TISSUE EXAM BY PATHOLOGIST: CPT

## 2023-08-15 PROCEDURE — 36415 COLL VENOUS BLD VENIPUNCTURE: CPT

## 2023-08-15 PROCEDURE — 88313 SPECIAL STAINS GROUP 2: CPT

## 2023-08-15 PROCEDURE — 6360000002 HC RX W HCPCS: Performed by: ANESTHESIOLOGY

## 2023-08-15 PROCEDURE — 2500000003 HC RX 250 WO HCPCS: Performed by: NURSE ANESTHETIST, CERTIFIED REGISTERED

## 2023-08-15 PROCEDURE — 86850 RBC ANTIBODY SCREEN: CPT

## 2023-08-15 PROCEDURE — 6370000000 HC RX 637 (ALT 250 FOR IP): Performed by: SPECIALIST

## 2023-08-15 PROCEDURE — 0FB24ZX EXCISION OF LEFT LOBE LIVER, PERCUTANEOUS ENDOSCOPIC APPROACH, DIAGNOSTIC: ICD-10-PCS | Performed by: SPECIALIST

## 2023-08-15 PROCEDURE — 3700000001 HC ADD 15 MINUTES (ANESTHESIA): Performed by: SPECIALIST

## 2023-08-15 PROCEDURE — 3600000005 HC SURGERY LEVEL 5 BASE: Performed by: SPECIALIST

## 2023-08-15 PROCEDURE — 81025 URINE PREGNANCY TEST: CPT

## 2023-08-15 PROCEDURE — C1781 MESH (IMPLANTABLE): HCPCS | Performed by: SPECIALIST

## 2023-08-15 PROCEDURE — 3700000000 HC ANESTHESIA ATTENDED CARE: Performed by: SPECIALIST

## 2023-08-15 PROCEDURE — C9399 UNCLASSIFIED DRUGS OR BIOLOG: HCPCS | Performed by: NURSE ANESTHETIST, CERTIFIED REGISTERED

## 2023-08-15 PROCEDURE — 86901 BLOOD TYPING SEROLOGIC RH(D): CPT

## 2023-08-15 RX ORDER — MORPHINE SULFATE 10 MG/ML
6 INJECTION, SOLUTION INTRAMUSCULAR; INTRAVENOUS EVERY 4 HOURS PRN
Status: DISCONTINUED | OUTPATIENT
Start: 2023-08-15 | End: 2023-08-16

## 2023-08-15 RX ORDER — GLYCOPYRROLATE 0.2 MG/ML
INJECTION INTRAMUSCULAR; INTRAVENOUS PRN
Status: DISCONTINUED | OUTPATIENT
Start: 2023-08-15 | End: 2023-08-15 | Stop reason: SDUPTHER

## 2023-08-15 RX ORDER — SODIUM CHLORIDE 0.9 % (FLUSH) 0.9 %
5-40 SYRINGE (ML) INJECTION EVERY 12 HOURS SCHEDULED
Status: DISCONTINUED | OUTPATIENT
Start: 2023-08-15 | End: 2023-08-15 | Stop reason: HOSPADM

## 2023-08-15 RX ORDER — DROPERIDOL 2.5 MG/ML
0.62 INJECTION, SOLUTION INTRAMUSCULAR; INTRAVENOUS
Status: COMPLETED | OUTPATIENT
Start: 2023-08-15 | End: 2023-08-15

## 2023-08-15 RX ORDER — EPHEDRINE SULFATE/0.9% NACL/PF 50 MG/5 ML
SYRINGE (ML) INTRAVENOUS PRN
Status: DISCONTINUED | OUTPATIENT
Start: 2023-08-15 | End: 2023-08-15 | Stop reason: SDUPTHER

## 2023-08-15 RX ORDER — LABETALOL HYDROCHLORIDE 5 MG/ML
10 INJECTION, SOLUTION INTRAVENOUS
Status: DISCONTINUED | OUTPATIENT
Start: 2023-08-15 | End: 2023-08-15 | Stop reason: HOSPADM

## 2023-08-15 RX ORDER — FENTANYL CITRATE 50 UG/ML
INJECTION, SOLUTION INTRAMUSCULAR; INTRAVENOUS PRN
Status: DISCONTINUED | OUTPATIENT
Start: 2023-08-15 | End: 2023-08-15 | Stop reason: SDUPTHER

## 2023-08-15 RX ORDER — HYDROMORPHONE HYDROCHLORIDE 1 MG/ML
0.5 INJECTION, SOLUTION INTRAMUSCULAR; INTRAVENOUS; SUBCUTANEOUS EVERY 5 MIN PRN
Status: DISCONTINUED | OUTPATIENT
Start: 2023-08-15 | End: 2023-08-15 | Stop reason: HOSPADM

## 2023-08-15 RX ORDER — IPRATROPIUM BROMIDE AND ALBUTEROL SULFATE 2.5; .5 MG/3ML; MG/3ML
1 SOLUTION RESPIRATORY (INHALATION)
Status: DISCONTINUED | OUTPATIENT
Start: 2023-08-15 | End: 2023-08-15 | Stop reason: HOSPADM

## 2023-08-15 RX ORDER — SODIUM CHLORIDE, SODIUM LACTATE, POTASSIUM CHLORIDE, CALCIUM CHLORIDE 600; 310; 30; 20 MG/100ML; MG/100ML; MG/100ML; MG/100ML
INJECTION, SOLUTION INTRAVENOUS CONTINUOUS
Status: DISCONTINUED | OUTPATIENT
Start: 2023-08-15 | End: 2023-08-16 | Stop reason: HOSPADM

## 2023-08-15 RX ORDER — FENTANYL CITRATE 50 UG/ML
25 INJECTION, SOLUTION INTRAMUSCULAR; INTRAVENOUS EVERY 5 MIN PRN
Status: DISCONTINUED | OUTPATIENT
Start: 2023-08-15 | End: 2023-08-15 | Stop reason: HOSPADM

## 2023-08-15 RX ORDER — MEPERIDINE HYDROCHLORIDE 50 MG/ML
12.5 INJECTION INTRAMUSCULAR; INTRAVENOUS; SUBCUTANEOUS ONCE
Status: DISCONTINUED | OUTPATIENT
Start: 2023-08-15 | End: 2023-08-15 | Stop reason: HOSPADM

## 2023-08-15 RX ORDER — ONDANSETRON 2 MG/ML
4 INJECTION INTRAMUSCULAR; INTRAVENOUS
Status: DISCONTINUED | OUTPATIENT
Start: 2023-08-15 | End: 2023-08-15 | Stop reason: HOSPADM

## 2023-08-15 RX ORDER — SODIUM CHLORIDE 9 MG/ML
INJECTION, SOLUTION INTRAVENOUS PRN
Status: DISCONTINUED | OUTPATIENT
Start: 2023-08-15 | End: 2023-08-16 | Stop reason: HOSPADM

## 2023-08-15 RX ORDER — PROPOFOL 10 MG/ML
INJECTION, EMULSION INTRAVENOUS PRN
Status: DISCONTINUED | OUTPATIENT
Start: 2023-08-15 | End: 2023-08-15 | Stop reason: SDUPTHER

## 2023-08-15 RX ORDER — ACETAMINOPHEN 500 MG
1000 TABLET ORAL ONCE
Status: COMPLETED | OUTPATIENT
Start: 2023-08-15 | End: 2023-08-15

## 2023-08-15 RX ORDER — DIPHENHYDRAMINE HYDROCHLORIDE 50 MG/ML
12.5 INJECTION INTRAMUSCULAR; INTRAVENOUS
Status: DISCONTINUED | OUTPATIENT
Start: 2023-08-15 | End: 2023-08-15 | Stop reason: HOSPADM

## 2023-08-15 RX ORDER — SODIUM CHLORIDE 0.9 % (FLUSH) 0.9 %
5-40 SYRINGE (ML) INJECTION PRN
Status: DISCONTINUED | OUTPATIENT
Start: 2023-08-15 | End: 2023-08-15 | Stop reason: HOSPADM

## 2023-08-15 RX ORDER — MIDAZOLAM HYDROCHLORIDE 1 MG/ML
INJECTION INTRAMUSCULAR; INTRAVENOUS PRN
Status: DISCONTINUED | OUTPATIENT
Start: 2023-08-15 | End: 2023-08-15 | Stop reason: SDUPTHER

## 2023-08-15 RX ORDER — ENOXAPARIN SODIUM 100 MG/ML
40 INJECTION SUBCUTANEOUS ONCE
Status: COMPLETED | OUTPATIENT
Start: 2023-08-15 | End: 2023-08-15

## 2023-08-15 RX ORDER — SIMETHICONE 80 MG
80 TABLET,CHEWABLE ORAL EVERY 6 HOURS PRN
Status: DISCONTINUED | OUTPATIENT
Start: 2023-08-15 | End: 2023-08-16 | Stop reason: HOSPADM

## 2023-08-15 RX ORDER — BUPIVACAINE HYDROCHLORIDE AND EPINEPHRINE 5; 5 MG/ML; UG/ML
INJECTION, SOLUTION EPIDURAL; INTRACAUDAL; PERINEURAL PRN
Status: DISCONTINUED | OUTPATIENT
Start: 2023-08-15 | End: 2023-08-15 | Stop reason: ALTCHOICE

## 2023-08-15 RX ORDER — ONDANSETRON 4 MG/1
4 TABLET, ORALLY DISINTEGRATING ORAL EVERY 8 HOURS PRN
Status: DISCONTINUED | OUTPATIENT
Start: 2023-08-15 | End: 2023-08-16 | Stop reason: HOSPADM

## 2023-08-15 RX ORDER — SODIUM CHLORIDE 9 MG/ML
INJECTION, SOLUTION INTRAVENOUS PRN
Status: DISCONTINUED | OUTPATIENT
Start: 2023-08-15 | End: 2023-08-15 | Stop reason: HOSPADM

## 2023-08-15 RX ORDER — KETOROLAC TROMETHAMINE 30 MG/ML
30 INJECTION, SOLUTION INTRAMUSCULAR; INTRAVENOUS EVERY 6 HOURS
Status: DISCONTINUED | OUTPATIENT
Start: 2023-08-15 | End: 2023-08-16 | Stop reason: HOSPADM

## 2023-08-15 RX ORDER — SODIUM CHLORIDE, SODIUM LACTATE, POTASSIUM CHLORIDE, CALCIUM CHLORIDE 600; 310; 30; 20 MG/100ML; MG/100ML; MG/100ML; MG/100ML
INJECTION, SOLUTION INTRAVENOUS CONTINUOUS
Status: DISCONTINUED | OUTPATIENT
Start: 2023-08-15 | End: 2023-08-15 | Stop reason: HOSPADM

## 2023-08-15 RX ORDER — ONDANSETRON 2 MG/ML
4 INJECTION INTRAMUSCULAR; INTRAVENOUS EVERY 6 HOURS PRN
Status: DISCONTINUED | OUTPATIENT
Start: 2023-08-15 | End: 2023-08-16 | Stop reason: HOSPADM

## 2023-08-15 RX ORDER — SODIUM CHLORIDE, SODIUM LACTATE, POTASSIUM CHLORIDE, AND CALCIUM CHLORIDE .6; .31; .03; .02 G/100ML; G/100ML; G/100ML; G/100ML
IRRIGANT IRRIGATION PRN
Status: DISCONTINUED | OUTPATIENT
Start: 2023-08-15 | End: 2023-08-15 | Stop reason: ALTCHOICE

## 2023-08-15 RX ORDER — OXYCODONE HYDROCHLORIDE 5 MG/1
5 TABLET ORAL
Status: DISCONTINUED | OUTPATIENT
Start: 2023-08-15 | End: 2023-08-15 | Stop reason: HOSPADM

## 2023-08-15 RX ORDER — ENOXAPARIN SODIUM 100 MG/ML
40 INJECTION SUBCUTANEOUS EVERY 12 HOURS SCHEDULED
Status: DISCONTINUED | OUTPATIENT
Start: 2023-08-15 | End: 2023-08-16 | Stop reason: HOSPADM

## 2023-08-15 RX ORDER — LIDOCAINE HYDROCHLORIDE 20 MG/ML
INJECTION, SOLUTION EPIDURAL; INFILTRATION; INTRACAUDAL; PERINEURAL PRN
Status: DISCONTINUED | OUTPATIENT
Start: 2023-08-15 | End: 2023-08-15 | Stop reason: SDUPTHER

## 2023-08-15 RX ORDER — SODIUM CHLORIDE 0.9 % (FLUSH) 0.9 %
5-40 SYRINGE (ML) INJECTION EVERY 12 HOURS SCHEDULED
Status: DISCONTINUED | OUTPATIENT
Start: 2023-08-15 | End: 2023-08-16 | Stop reason: HOSPADM

## 2023-08-15 RX ORDER — ROCURONIUM BROMIDE 10 MG/ML
INJECTION, SOLUTION INTRAVENOUS PRN
Status: DISCONTINUED | OUTPATIENT
Start: 2023-08-15 | End: 2023-08-15 | Stop reason: SDUPTHER

## 2023-08-15 RX ORDER — SODIUM CHLORIDE 0.9 % (FLUSH) 0.9 %
5-40 SYRINGE (ML) INJECTION PRN
Status: DISCONTINUED | OUTPATIENT
Start: 2023-08-15 | End: 2023-08-16 | Stop reason: HOSPADM

## 2023-08-15 RX ADMIN — SODIUM CHLORIDE, SODIUM LACTATE, POTASSIUM CHLORIDE, AND CALCIUM CHLORIDE: 600; 310; 30; 20 INJECTION, SOLUTION INTRAVENOUS at 12:21

## 2023-08-15 RX ADMIN — MORPHINE SULFATE 6 MG: 10 INJECTION INTRAVENOUS at 22:28

## 2023-08-15 RX ADMIN — MORPHINE SULFATE 6 MG: 10 INJECTION INTRAVENOUS at 18:19

## 2023-08-15 RX ADMIN — Medication 3000 MG: at 12:07

## 2023-08-15 RX ADMIN — ROCURONIUM BROMIDE 50 MG: 10 INJECTION, SOLUTION INTRAVENOUS at 12:02

## 2023-08-15 RX ADMIN — SODIUM CHLORIDE, SODIUM LACTATE, POTASSIUM CHLORIDE, AND CALCIUM CHLORIDE: 600; 310; 30; 20 INJECTION, SOLUTION INTRAVENOUS at 10:13

## 2023-08-15 RX ADMIN — LIDOCAINE HYDROCHLORIDE 100 MG: 20 INJECTION, SOLUTION EPIDURAL; INFILTRATION; INTRACAUDAL; PERINEURAL at 12:02

## 2023-08-15 RX ADMIN — KETOROLAC TROMETHAMINE 30 MG: 30 INJECTION, SOLUTION INTRAMUSCULAR; INTRAVENOUS at 16:26

## 2023-08-15 RX ADMIN — SODIUM CHLORIDE, POTASSIUM CHLORIDE, SODIUM LACTATE AND CALCIUM CHLORIDE: 600; 310; 30; 20 INJECTION, SOLUTION INTRAVENOUS at 14:41

## 2023-08-15 RX ADMIN — KETOROLAC TROMETHAMINE 30 MG: 30 INJECTION, SOLUTION INTRAMUSCULAR; INTRAVENOUS at 22:28

## 2023-08-15 RX ADMIN — MIDAZOLAM 2 MG: 1 INJECTION INTRAMUSCULAR; INTRAVENOUS at 11:58

## 2023-08-15 RX ADMIN — NYSTATIN 500000 UNITS: 100000 SUSPENSION ORAL at 10:13

## 2023-08-15 RX ADMIN — ACETAMINOPHEN 1000 MG: 500 TABLET ORAL at 10:13

## 2023-08-15 RX ADMIN — SUGAMMADEX 200 MG: 100 INJECTION, SOLUTION INTRAVENOUS at 13:27

## 2023-08-15 RX ADMIN — SODIUM CHLORIDE, SODIUM LACTATE, POTASSIUM CHLORIDE, AND CALCIUM CHLORIDE: 600; 310; 30; 20 INJECTION, SOLUTION INTRAVENOUS at 13:23

## 2023-08-15 RX ADMIN — SODIUM CHLORIDE, POTASSIUM CHLORIDE, SODIUM LACTATE AND CALCIUM CHLORIDE: 600; 310; 30; 20 INJECTION, SOLUTION INTRAVENOUS at 16:04

## 2023-08-15 RX ADMIN — PROPOFOL 300 MG: 10 INJECTION, EMULSION INTRAVENOUS at 12:02

## 2023-08-15 RX ADMIN — SODIUM CHLORIDE 40 MG: 9 INJECTION INTRAMUSCULAR; INTRAVENOUS; SUBCUTANEOUS at 22:28

## 2023-08-15 RX ADMIN — Medication 12.5 MG: at 12:28

## 2023-08-15 RX ADMIN — HYDROMORPHONE HYDROCHLORIDE 0.5 MG: 1 INJECTION, SOLUTION INTRAMUSCULAR; INTRAVENOUS; SUBCUTANEOUS at 14:12

## 2023-08-15 RX ADMIN — HYDROMORPHONE HYDROCHLORIDE 0.5 MG: 1 INJECTION, SOLUTION INTRAMUSCULAR; INTRAVENOUS; SUBCUTANEOUS at 12:25

## 2023-08-15 RX ADMIN — Medication 12.5 MG: at 12:30

## 2023-08-15 RX ADMIN — GLYCOPYRROLATE 0.1 MG: 0.2 INJECTION INTRAMUSCULAR; INTRAVENOUS at 11:58

## 2023-08-15 RX ADMIN — HYDROMORPHONE HYDROCHLORIDE 0.5 MG: 1 INJECTION, SOLUTION INTRAMUSCULAR; INTRAVENOUS; SUBCUTANEOUS at 13:06

## 2023-08-15 RX ADMIN — ENOXAPARIN SODIUM 40 MG: 100 INJECTION SUBCUTANEOUS at 22:28

## 2023-08-15 RX ADMIN — ENOXAPARIN SODIUM 40 MG: 100 INJECTION SUBCUTANEOUS at 10:13

## 2023-08-15 RX ADMIN — FENTANYL CITRATE 100 MCG: 50 INJECTION, SOLUTION INTRAMUSCULAR; INTRAVENOUS at 11:58

## 2023-08-15 RX ADMIN — FAMOTIDINE 20 MG: 10 INJECTION, SOLUTION INTRAVENOUS at 10:13

## 2023-08-15 RX ADMIN — SODIUM CHLORIDE, PRESERVATIVE FREE 10 ML: 5 INJECTION INTRAVENOUS at 20:11

## 2023-08-15 RX ADMIN — ONDANSETRON 4 MG: 2 INJECTION INTRAMUSCULAR; INTRAVENOUS at 20:11

## 2023-08-15 RX ADMIN — HYDROMORPHONE HYDROCHLORIDE 0.5 MG: 1 INJECTION, SOLUTION INTRAMUSCULAR; INTRAVENOUS; SUBCUTANEOUS at 13:57

## 2023-08-15 RX ADMIN — HYDROMORPHONE HYDROCHLORIDE 0.5 MG: 1 INJECTION, SOLUTION INTRAMUSCULAR; INTRAVENOUS; SUBCUTANEOUS at 14:25

## 2023-08-15 RX ADMIN — DROPERIDOL 0.62 MG: 2.5 INJECTION, SOLUTION INTRAMUSCULAR; INTRAVENOUS at 13:52

## 2023-08-15 ASSESSMENT — PAIN SCALES - GENERAL
PAINLEVEL_OUTOF10: 7
PAINLEVEL_OUTOF10: 0
PAINLEVEL_OUTOF10: 7
PAINLEVEL_OUTOF10: 0
PAINLEVEL_OUTOF10: 6
PAINLEVEL_OUTOF10: 3
PAINLEVEL_OUTOF10: 8
PAINLEVEL_OUTOF10: 0
PAINLEVEL_OUTOF10: 7
PAINLEVEL_OUTOF10: 0
PAINLEVEL_OUTOF10: 0

## 2023-08-15 ASSESSMENT — PAIN DESCRIPTION - DESCRIPTORS
DESCRIPTORS: ACHING

## 2023-08-15 ASSESSMENT — PAIN DESCRIPTION - LOCATION
LOCATION: ABDOMEN

## 2023-08-15 ASSESSMENT — PAIN DESCRIPTION - PAIN TYPE: TYPE: SURGICAL PAIN

## 2023-08-15 ASSESSMENT — PAIN DESCRIPTION - ORIENTATION
ORIENTATION: ANTERIOR
ORIENTATION: ANTERIOR

## 2023-08-15 ASSESSMENT — PAIN - FUNCTIONAL ASSESSMENT
PAIN_FUNCTIONAL_ASSESSMENT: ACTIVITIES ARE NOT PREVENTED
PAIN_FUNCTIONAL_ASSESSMENT: ACTIVITIES ARE NOT PREVENTED
PAIN_FUNCTIONAL_ASSESSMENT: 0-10

## 2023-08-15 NOTE — PROGRESS NOTES
1911 - Assumed care at this time. 2004 - Patient A&Ox4, RA (pt on 2L for sleep). Denies chest pain and SOB. Pt getting up to denies on IS. C/o nausea, pt medicated per MAR. Denies flatus, pt is belching. SCD compression device and TEDs bilaterally. x5 lap sites with gauze/tape dressings to abdomen C/D/I. Denies numbness/tingling/calf pain. Pain 3/10 with a tolerable level of 6/10. Pt educated on IS use, diet, q2h rounds, importance of ambulation, pain management, and 5am being the last dose of narcotics prior to GI study. Pt verbalized understanding, no concerns voiced. Call bell within reach, bed in lowest position. Pt encouraged to call for assistance. 0354 - Pt ambulating in hallway with staff. 0444 - Pt declines last pain medication at this time. show

## 2023-08-15 NOTE — PROGRESS NOTES
1512  Received client from PACU in satisfactory condition. Client is a pt of Dr. Arianna Payton. Pt had a Lap Sleeve Gastrectomy, Oversewn  Entire Gastric Staple Line, Lliver Wedge Bx, and Intra Operative Endoscopy with Bx.  today. Client is sleepy but wakes up easily to verbal stimuli. O X 4. Client is calm and cooperative. Client denies numbness or tingling to any extremity. With + Radial,Posterior Tibial and Dorsalis Pedis pulses. Capillary Refill less than 3 seconds. Skin is warm , dry and skin color is appropriate to race. Client is negative for JVD. Bibasilar breath sounds clear but diminished. No use of accessory muscles. Bowel sounds hypoactive to all quadrants. Abdomen is soft and non-tender. Client has voided in bedpan upon arrival in room. Client has 5 trocar sites and previous MARIVEL site  with gauze and medipore tapes. All dressings are dry and intact. Pt has Uvaldo hoses to [employer]. . Sequential compression device applied. Client has RFA18  gauge PIV present and running LR at 125 ml/hr. Clients pain is 0/10 on 0-10 scale. Client oriented to call bell use as well as bed use. Client oriented to phone and how to order meals. Call bell within reach. Bed in low position. Three side rails up. Armbands/ Fall riskband intact. Dual skin check done with Danielle Geronimo RN. No skin breakdown noted. 1538   Pt ambulated in BR to void. 1601   Pt returned to bed. Pain level 7/10 after ambulation. 1626   Pt medicated with scheduled dose of Toradol 30 mg IV for pain. level 7/10. Pt went back to sleep. 1811  Pt ambulated and voided in BR then ambulated in hallway then back in bed. Pt medicated with Morphine 6 mg IV for pain level 7/10.

## 2023-08-15 NOTE — ANESTHESIA PRE PROCEDURE
12:00 AM    K 4.1 07/31/2023 12:00 AM     07/31/2023 12:00 AM    CO2 23 07/31/2023 12:00 AM    BUN 15 07/31/2023 12:00 AM    CREATININE 0.69 07/31/2023 12:00 AM    GFRAA >60 06/09/2021 06:50 AM    AGRATIO 1.3 07/31/2023 12:00 AM    LABGLOM 110 07/31/2023 12:00 AM    GLUCOSE 89 07/31/2023 12:00 AM    PROT 7.6 07/31/2023 12:00 AM    CALCIUM 9.2 07/31/2023 12:00 AM    BILITOT 0.2 07/31/2023 12:00 AM    ALKPHOS 80 07/31/2023 12:00 AM    AST 15 07/31/2023 12:00 AM    ALT 19 07/31/2023 12:00 AM       POC Tests: No results for input(s): POCGLU, POCNA, POCK, POCCL, POCBUN, POCHEMO, POCHCT in the last 72 hours. Coags: No results found for: PROTIME, INR, APTT    HCG (If Applicable):   Lab Results   Component Value Date    PREGTESTUR Negative 08/15/2023        ABGs:   Lab Results   Component Value Date/Time    PHART 7.41 06/08/2021 12:22 PM    PO2ART 98 06/08/2021 12:22 PM    TNY2PPF 33.0 06/08/2021 12:22 PM    MAP6LFJ 21.1 06/08/2021 12:22 PM        Type & Screen (If Applicable):  Lab Results   Component Value Date    LABABO O 07/31/2023    LABRH Negative 07/31/2023       Drug/Infectious Status (If Applicable):  No results found for: HIV, HEPCAB    COVID-19 Screening (If Applicable): No results found for: COVID19        Anesthesia Evaluation  Patient summary reviewed and Nursing notes reviewed  Airway: Mallampati: III  TM distance: >3 FB   Neck ROM: full  Mouth opening: > = 3 FB   Dental:      Comment: Broken right front upper    Pulmonary:normal exam    (+) sleep apnea:                             Cardiovascular:  Exercise tolerance: no interval change,                     Neuro/Psych:   Negative Neuro/Psych ROS              GI/Hepatic/Renal: Neg GI/Hepatic/Renal ROS            Endo/Other:                     Abdominal:             Vascular: negative vascular ROS. Other Findings:           Anesthesia Plan      general     ASA 3       Induction: intravenous.       Anesthetic plan and risks discussed with

## 2023-08-15 NOTE — PERIOP NOTE
TRANSFER - OUT REPORT:    Verbal report given to Sharee Gongora RN  on [employer]  being transferred to [facility]  for routine progression of patient care       Report consisted of patient's Situation, Background, Assessment and   Recommendations(SBAR). Information from the following report(s) Adult Overview, Surgery Report, Intake/Output, and MAR was reviewed with the receiving nurse. Lines:   Peripheral IV 08/15/23 Right; Anterior Forearm (Active)   Site Assessment Clean, dry & intact 08/15/23 1416   Line Status Infusing 08/15/23 1416   Phlebitis Assessment No symptoms 08/15/23 1012   Infiltration Assessment 0 08/15/23 1012   Alcohol Cap Used No 08/15/23 1012   Dressing Status Clean, dry & intact 08/15/23 1012   Dressing Type Transparent 08/15/23 1012        Opportunity for questions and clarification was provided.       Patient transported with:  O2 @ 2lpm, Registered Nurse, and Eq[employer]

## 2023-08-15 NOTE — PROGRESS NOTES
1509  TRANSFER - IN REPORT:    Verbal report received from Kelley Lacy RN on [employer]  being received from PACU for routine post-op      Report consisted of patient's Situation, Background, Assessment and   Recommendations(SBAR). Information from the following report(s) Nurse Handoff Report, Surgery Report, and MAR was reviewed with the receiving nurse. Opportunity for questions and clarification was provided. Assessment completed upon patient's arrival to unit and care assumed.

## 2023-08-15 NOTE — INTERVAL H&P NOTE
Update History & Physical    The patient's History and Physical of July 31, 2023 was reviewed with the patient and I examined the patient. There was no change. The surgical site was confirmed by the patient and me. Plan: The risks, benefits, expected outcome, and alternative to the recommended procedure have been discussed with the patient. Patient understands and wants to proceed with the procedure.      Electronically signed by Monster Verde MD on 8/15/2023 at 10:45 AM

## 2023-08-15 NOTE — PERIOP NOTE
Lovenox and Zofran brought from home by patient. Patient states her nephew got into her Lovenox. Patient stated 3 were \"damaged\". When packing up Lovenox to take to pharmacy  3 Lovenox syringes were out of their packages with medicine gone and needles exposed. The 3 damaged syringes discarded in sharps container in front of patient with her permission. Dr Army Alvarenga notified of the above.

## 2023-08-15 NOTE — PERIOP NOTE
Reviewed PTA medication list with patient/caregiver and patient/caregiver denies any additional medications. Patient admits to having a responsible adult care for them at home for at least 24 hours after surgery. Patient encouraged to use gown warming system and informed that using said warming gown to regulate body temperature prior to a procedure has been shown to help reduce the risks of blood clots and infection. Patient's pharmacy of choice verified and documented in PTA medication section. Dual skin assessment & fall risk band verification completed with Remberto Zavala RN.

## 2023-08-15 NOTE — OP NOTE
none peritoneal cavity which was then insufflated. The Visi-Port was then placed   at that site,then 4 additional trocars were placed in the usual U-shaped   configuration with a subxiphoid incision being made to accommodate the   [facility] retractor. On entering the abdomen, the patient was noted to have a   massively fatty liver with possible evidence of early steatohepatitis. I elevated the liver and noted the   patient had no diaphragmatic hernia present. I began the operation by choosing an area 2-3 cm   proximal to the pylorus and within the gastroepiploic vessels I began to divide off these vessels individually. I moved cephalad toward short gastric vessels, which were very difficult to take down due to the proximity   to the splenic hilum. I was able to do so, clearing the entire left crural area. I then placed a Visigi tube,   impacting at the distal antrum. I then began the resection with the powered [place]   stapler using the green loads with Endopath buttress strips for the first firing tangential along the   antral region. The second and subsequent firings were used with blue  reloads and the same buttress strips reaching just past the incisura region. The remainder of the 4 vertical   firings completed the resection at the left crural region. I then tested   the pouch via the Visigi tube using dilute methylene blue,it was noted to be completely   Watertight. At this juncture due to some gastric wall thickening  of the sleeve,   I chose to oversew the staple line. I obtained a 2-0 Ethibond suture and oversewed the staple line from the   apex to the distal sleeve. This portion of the procedure took quite some time to perform as I began at   the apex of the staple line and oversewed it all the way down to the prepyloric region. The total time spent   performing this oversew was in excess of 25 minutes duration.   We did perform this due to the thickness   of the gastric wall and the realization that

## 2023-08-15 NOTE — PERIOP NOTE
TRANSFER - IN REPORT:    Verbal report received from [address] [place] [address], Melodie Vickers RN  on [employer]  being received from OR  for routine progression of patient care      Report consisted of patient's Situation, Background, Assessment and   Recommendations(SBAR). Information from the following report(s) Surgery Report, Intake/Output, MAR, and Recent Results was reviewed with the receiving nurse. Opportunity for questions and clarification was provided. Assessment completed upon patient's arrival to unit and care assumed.

## 2023-08-16 ENCOUNTER — CLINICAL DOCUMENTATION (OUTPATIENT)
Age: 43
End: 2023-08-16

## 2023-08-16 ENCOUNTER — APPOINTMENT (OUTPATIENT)
Facility: HOSPITAL | Age: 43
DRG: 403 | End: 2023-08-16
Attending: SPECIALIST
Payer: MEDICAID

## 2023-08-16 VITALS
RESPIRATION RATE: 16 BRPM | DIASTOLIC BLOOD PRESSURE: 80 MMHG | TEMPERATURE: 99 F | WEIGHT: 293 LBS | OXYGEN SATURATION: 97 % | HEART RATE: 90 BPM | SYSTOLIC BLOOD PRESSURE: 125 MMHG | HEIGHT: 72 IN | BODY MASS INDEX: 39.68 KG/M2

## 2023-08-16 DIAGNOSIS — G89.18 POST-OP PAIN: Primary | ICD-10-CM

## 2023-08-16 PROCEDURE — 6360000002 HC RX W HCPCS: Performed by: SPECIALIST

## 2023-08-16 PROCEDURE — 74240 X-RAY XM UPR GI TRC 1CNTRST: CPT

## 2023-08-16 PROCEDURE — 2500000003 HC RX 250 WO HCPCS: Performed by: SPECIALIST

## 2023-08-16 PROCEDURE — 2580000003 HC RX 258: Performed by: SPECIALIST

## 2023-08-16 PROCEDURE — 6360000004 HC RX CONTRAST MEDICATION: Performed by: SPECIALIST

## 2023-08-16 PROCEDURE — 6370000000 HC RX 637 (ALT 250 FOR IP): Performed by: SPECIALIST

## 2023-08-16 RX ORDER — OXYCODONE HYDROCHLORIDE AND ACETAMINOPHEN 5; 325 MG/1; MG/1
1 TABLET ORAL EVERY 4 HOURS PRN
Status: DISCONTINUED | OUTPATIENT
Start: 2023-08-16 | End: 2023-08-16 | Stop reason: HOSPADM

## 2023-08-16 RX ORDER — OXYCODONE HYDROCHLORIDE AND ACETAMINOPHEN 5; 325 MG/1; MG/1
1 TABLET ORAL EVERY 6 HOURS PRN
Qty: 28 TABLET | Refills: 0 | Status: SHIPPED | OUTPATIENT
Start: 2023-08-16 | End: 2023-08-23

## 2023-08-16 RX ORDER — ENOXAPARIN SODIUM 100 MG/ML
INJECTION SUBCUTANEOUS
Qty: 3 EACH | Refills: 0 | Status: SHIPPED | OUTPATIENT
Start: 2023-08-16 | End: 2023-08-17

## 2023-08-16 RX ADMIN — BARIUM SULFATE 15 G: 960 POWDER, FOR SUSPENSION ORAL at 07:28

## 2023-08-16 RX ADMIN — DIATRIZOATE MEGLUMINE AND DIATRIZOATE SODIUM 15 ML: 660; 100 LIQUID ORAL; RECTAL at 07:28

## 2023-08-16 RX ADMIN — SODIUM CHLORIDE, SODIUM LACTATE, POTASSIUM CHLORIDE, AND CALCIUM CHLORIDE: 600; 310; 30; 20 INJECTION, SOLUTION INTRAVENOUS at 11:16

## 2023-08-16 RX ADMIN — OXYCODONE HYDROCHLORIDE AND ACETAMINOPHEN 1 TABLET: 5; 325 TABLET ORAL at 09:24

## 2023-08-16 RX ADMIN — ONDANSETRON 4 MG: 2 INJECTION INTRAMUSCULAR; INTRAVENOUS at 09:24

## 2023-08-16 RX ADMIN — ENOXAPARIN SODIUM 40 MG: 100 INJECTION SUBCUTANEOUS at 09:24

## 2023-08-16 RX ADMIN — KETOROLAC TROMETHAMINE 30 MG: 30 INJECTION, SOLUTION INTRAMUSCULAR; INTRAVENOUS at 11:18

## 2023-08-16 RX ADMIN — KETOROLAC TROMETHAMINE 30 MG: 30 INJECTION, SOLUTION INTRAMUSCULAR; INTRAVENOUS at 03:48

## 2023-08-16 ASSESSMENT — PAIN DESCRIPTION - DESCRIPTORS
DESCRIPTORS: ACHING
DESCRIPTORS: ACHING

## 2023-08-16 ASSESSMENT — PAIN DESCRIPTION - LOCATION
LOCATION: ABDOMEN
LOCATION: ABDOMEN

## 2023-08-16 ASSESSMENT — PAIN SCALES - GENERAL
PAINLEVEL_OUTOF10: 8
PAINLEVEL_OUTOF10: 0
PAINLEVEL_OUTOF10: 6

## 2023-08-16 ASSESSMENT — PAIN - FUNCTIONAL ASSESSMENT
PAIN_FUNCTIONAL_ASSESSMENT: ACTIVITIES ARE NOT PREVENTED
PAIN_FUNCTIONAL_ASSESSMENT: ACTIVITIES ARE NOT PREVENTED

## 2023-08-16 ASSESSMENT — PAIN DESCRIPTION - ORIENTATION
ORIENTATION: LOWER;UPPER
ORIENTATION: LOWER;UPPER

## 2023-08-16 NOTE — PROGRESS NOTES
Bedside and Verbal shift change report given to KIMBERLY Peterson RN (oncoming nurse) by Maki Lauren RN (offgoing nurse). Report included the following information Nurse Handoff Report, Adult Overview, Surgery Report, Intake/Output, MAR, and Recent Results.

## 2023-08-16 NOTE — PROGRESS NOTES
When patient picked up Lovenox, three injections were damaged, as they were in a plastic bag. Nurse in hospital discarded them in the sharps container. RN ordered three more injections.

## 2023-08-16 NOTE — PROGRESS NOTES
\"CLINICAL PHARMACY NOTE: MEDS TO BEDS    Total # of Prescriptions Filled: 2   The following medications were delivered to the patient:  \"  Current Discharge Medication List        START taking these medications    Details   enoxaparin (LOVENOX) 40 MG/0.4ML Inject 0.4 milliliters subcutaneously every 12 hours  Qty: 3 each, Refills: 0         \"Oxycodone/APAP 5/325mg - take 1 tablet by mouth every 6 hours as needed for pain for up to 7 days. Max daily amount: 4 tablets.  - #28 tablets dispensed    Additional Documentation:

## 2023-08-16 NOTE — PLAN OF CARE
Problem: Chronic Conditions and Co-morbidities  Goal: Patient's chronic conditions and co-morbidity symptoms are monitored and maintained or improved  8/16/2023 1413 by Antonietta Meza RN  Outcome: Completed  8/16/2023 0949 by Antonietta Meza RN  Outcome: Progressing     Problem: Pain  Goal: Verbalizes/displays adequate comfort level or baseline comfort level  8/16/2023 1413 by Antonietta Meza RN  Outcome: Completed  8/16/2023 0949 by Antonietta Meza RN  Outcome: Progressing     Problem: Safety - Adult  Goal: Free from fall injury  8/16/2023 1413 by Antonietta Meza RN  Outcome: Completed  8/16/2023 0949 by Antonietta Meza RN  Outcome: Progressing     Problem: Discharge Planning  Goal: Discharge to home or other facility with appropriate resources  8/16/2023 1413 by Antonietta Meza RN  Outcome: Completed  8/16/2023 0949 by Antonietta Meza RN  Outcome: Progressing     Problem: ABCDS Injury Assessment  Goal: Absence of physical injury  8/16/2023 1413 by Antonietta Meza RN  Outcome: Completed  8/16/2023 0949 by Antonietta Meza RN  Outcome: Progressing

## 2023-08-16 NOTE — PROCEDURES
Formerly [facility]    Upper GI Procedure Report      [facility]    Medical Record [id number]    1980    Date of Service - August 16, 2023    Pre-Op Diagnosis - patient is status post sleeve resection performed by myself 24 hours ago. They now present for UGI to assess their post surgical anatomy. Post-Op Diagnosis -same    Procedure - UGI study with gastrografin    Surgeon - Linnea Menendez MD    Assistant - None    Complications - None    Specimens - None    Implants - None    Estimate Blood Loss - None    Statement of Medical Necessity - Need for radiologic evaluation prior to further management of their care. .    Procedure -the patient was brought to the fluoroscopy suite where they were given gastrografin. On swallowing the contrast the patient was noted to have normal peristalsis of their esophagus with progressive flow into the distal esophagus. Specific findings of the distal esophagus revealed that they did not have a hiatal hernia. Contrast flowed normally through the esophagus and into a properly sized sleeve stomach without reflux or obstruction or signs of stricture. The stomach filled in a timely manner and emptied into the duodenum without issue or hesitation. The anatomy was normal for the timeframe with no stricture or obstruction or any other abnormality. Given the benign findings of today's exam we will proceed with liquid diet and start PO medications.     Linnea Menendez MD

## 2023-08-16 NOTE — DISCHARGE SUMMARY
Discharge Summary    Patient: Reg Gonzalez               Sex: female          DOA: 8/15/2023         YOB: 1980      Age:  36 y.o.        LOS:  LOS: 1 day                Admit Date: 8/15/2023    Discharge Date: 8/16/2023    Admission Diagnoses: Insomnia with sleep apnea [G47.00, G47.30]  Morbid obesity ([address]) [E66.01]  Body mass index 40.0-44.9, adult ([address]) [Z68.41]  Morbid obesity with body mass index (BMI) of 40.0 or higher ([address]) [E66.01]    Discharge Diagnoses:      Discharge Condition: Good    Hospital Course: The patient underwent sleeve resection  on 8/15/2023. The patient tolerated the procedure well. Vital signs remained stable and the patient was transferred to  3rd floor surgical unit without complications. The patient remained stable throughout the first night post operatively with stable vital signs and adequate urine output and pain control. Pain was controlled with Dilaudid IV and IV Tylenol . The patient on the first morning post operative was transferred to the radiology suite where they underwent a gastrograffin UGI study which showed no evidence of a leak or stricture. The drain was discontinued on POD # 1 and the patient was started on a bariatric liquid diet with protein shakes. The patient progressed throughout the day and was ambulating well and tolerating their diet. They were therefore discharged home with instructions to notify me with any issues that may arise. Significant Diagnostic Studies:   No results for input(s): HGB in the last 72 hours. No results for input(s): HCT in the last 72 hours. Medication List        START taking these medications      oxyCODONE-acetaminophen 5-325 MG per tablet  Commonly known as: Percocet  Take 1 tablet by mouth every 6 hours as needed for Pain for up to 7 days.  Max Daily Amount: 4 tablets            CONTINUE taking these medications      ondansetron 8 MG Tbdp disintegrating tablet  Commonly known as: ZOFRAN-ODT  Place 1

## 2023-08-16 NOTE — PROGRESS NOTES
0732  Assumed care of Pt. Pt is not in the room at this time- Pt was taken to Xray. 0740  Pt is back from Xray. Pt is A&O x4. IV is patent and infusing LR @125ml/hr. Pt states pain is 5/10 and manageable at this time. Pt states she is experiencing nausea due to pain. Pt denies Chest pain and SOB. Pt has 6 trocar sites on abdomen sites are clean, dry, and intact. Bed locked in lowest position, call bell in reach. 5806  Assessment performed- see flowsheet. Pt states pain is 8/10 and would like nausea medication. 1903  Zofran IV 4mg and Percocet PO 5-325mg given. Pt tolerated well. Pt states she is worried about 99.5 temperature, Pt informed to use IS 10 times an hour to help get temperature lower. 1118  Pt denies pain at this time. Pt given scheduled medication, Pt tolerated well. 1200  Vitals obtained. Pt denies pain/nausea at this time. States she wants to take a nap. 1344  AVS meds reviewed with Aleyda Lara RN. IV removed- catheter intact, Pt tolerated well. Discharge instructions reviewed and given to Pt. Pt states she has no questions or concerns at this time. 1350  Medications Pt brought to hospital before surgery retrieved from Inpatient pharmacy and returned to the Pt.     1400  Pt is currently getting ready for discharge- Pt showering. 1  Pt discharged, education performed, IV removed- catheter intact, instructed Pt to call when ride arrived. Pt left without informing staff.

## 2023-08-17 ENCOUNTER — TELEPHONE (OUTPATIENT)
Age: 43
End: 2023-08-17

## 2023-08-17 PROBLEM — K31.89 GASTRIC WALL THICKENING: Status: ACTIVE | Noted: 2023-08-17

## 2023-08-17 PROBLEM — R13.10 DYSPHAGIA: Status: ACTIVE | Noted: 2023-08-17

## 2023-08-17 PROBLEM — K75.81 STEATOHEPATITIS: Status: ACTIVE | Noted: 2023-08-17

## 2023-08-17 PROBLEM — K29.30 SUPERFICIAL GASTRITIS WITHOUT HEMORRHAGE: Status: ACTIVE | Noted: 2023-08-17

## 2023-08-17 NOTE — TELEPHONE ENCOUNTER
This RN sent an [facility] notification via cell requesting a return call in order to check on patient post-operatively.

## 2023-08-24 ENCOUNTER — NURSE ONLY (OUTPATIENT)
Age: 43
End: 2023-08-24

## 2023-08-24 VITALS
HEART RATE: 88 BPM | DIASTOLIC BLOOD PRESSURE: 70 MMHG | BODY MASS INDEX: 39.68 KG/M2 | HEIGHT: 72 IN | OXYGEN SATURATION: 100 % | WEIGHT: 293 LBS | SYSTOLIC BLOOD PRESSURE: 123 MMHG | TEMPERATURE: 97.3 F

## 2023-08-24 RX ORDER — BACILLUS COAGULANS/INULIN 1B-250 MG
CAPSULE ORAL
COMMUNITY

## 2023-08-24 RX ORDER — ENOXAPARIN SODIUM 100 MG/ML
INJECTION SUBCUTANEOUS 2 TIMES DAILY
COMMUNITY

## 2023-08-24 RX ORDER — MULTIVIT WITH IRON,MINERALS
2 TABLET,CHEWABLE ORAL DAILY
COMMUNITY

## 2023-08-24 RX ORDER — OMEPRAZOLE 20 MG/1
20 CAPSULE, DELAYED RELEASE ORAL DAILY
Qty: 30 CAPSULE | Refills: 1 | Status: SHIPPED | OUTPATIENT
Start: 2023-08-24

## 2023-08-24 ASSESSMENT — PATIENT HEALTH QUESTIONNAIRE - PHQ9
2. FEELING DOWN, DEPRESSED OR HOPELESS: 0
SUM OF ALL RESPONSES TO PHQ QUESTIONS 1-9: 0
1. LITTLE INTEREST OR PLEASURE IN DOING THINGS: 0
SUM OF ALL RESPONSES TO PHQ QUESTIONS 1-9: 0
SUM OF ALL RESPONSES TO PHQ9 QUESTIONS 1 & 2: 0
SUM OF ALL RESPONSES TO PHQ QUESTIONS 1-9: 0
SUM OF ALL RESPONSES TO PHQ QUESTIONS 1-9: 0

## 2023-08-24 NOTE — PROGRESS NOTES
RN removed two sutures to LLQ of abdomen. Incision well approximated. Patient tolerated well. Remaining incisions well approximated with no areas of concern. Hydration: Patient estimated having hydrated with 50 ounces yesterday. RN reinforced the importance of documenting her hydration daily to ensure she is reaching 64 ounces daily. She verbalized understanding. RN re-educated her on the importance of adhering to the diet phases, as patient stated she had some tomato soup with crab meat. She verbalized understanding. Nausea and/or vomitting: None    Pain: Currently managed without medication    Lovenox injections: Administering every 12 hours, rotating sites. RN reminded patient to complete all injections, in which patient verbalized understanding. Lap sites: No erythema, drainage, and/or swelling    MARIVEL drain site: No drainage; dressing removed    BM: Twice daily    Ambulation: Patient is walking throughout house every hour. IS: Patient continues to use 10x's per hour while awake. She has reached 2,500 mL. Temperature: 97.3 degrees    Pulse: 88 bpm    Medications: (confirmed currently taking)   *Multi-vitamin: yes   *Probiotic: yes   *Prilosec: No; YOLI [place], sent prescription to [address] [place] [address]. Questions: None    This RN reminded the patient to contact the office with any questions and/or concerns. Patient verbalized understanding. Patient's two week post-op visit is scheduled and was confirmed.

## 2023-08-29 ENCOUNTER — HOSPITAL ENCOUNTER (OUTPATIENT)
Facility: HOSPITAL | Age: 43
Discharge: HOME | End: 2023-09-01

## 2023-08-29 NOTE — PROGRESS NOTES
Spoke with Vimal Hurley  today. Pt is approx. 2 weeks S/P laparoscopic sleeve gastrectomy procedure. Tolerating liquid diet very well. Fluid intake: 64 oz/day. Foods being consumed include greek yogurt, broth, sf popsicles, egg drop soup, and sf jello. No complaints. Protein shake intake: 1-2 protein supplement shakes/day. [x] Premier  [] [employer]  [] Ensure Max protein  [] Premier Clear  [] Isopure, unflavored   [] Other: N/A    Wt: 301 lbs    Weight is stated, visit was virtual.    Pre-op Wt: 314 lbs    EBW: 122  lbs   11 % EBWL    Activity: walking at a \"power walk\" pace for 0.75-1 mi in 45 min/day x 3-4 d/wk. Supplements:  [x] [employer] Complete Chewable MVI 2/day  [x] Probiotic QD  [x]Prilosec QD    Pt given one on one diet education over the phone. Reviewed diet progression for weeks 3-4. Patient appears to have a good understanding of the diet progression, food choices, and dietary/exercise habits for successful weight loss and nourishment after surgery. Reviewed pp. 42-47 of the patient education book. Discussed: post-op diet progression, including soft/pureed high protein, low fat, low sugar food recommendations; proper food group choices;  consumption of food and liquids, and consumption of adequate no-sugar, no-caffeine, no carbonation liquids. We reviewed appropriate food choices, meal adaptations (use of smaller dishes/utensils, eat slowly and chewing sufficiently for digestion), cooking techniques, and eating behavior modifications. Discussed intake regimen with 3 meals and 2-3 protein supplements per day. Additionally, intake timing - to include consuming a meal or snack every 3-4 hrs, the 30:30 rule, and having a meal within 2 hours of waking . Reinforced the importance of continued vitamin & probiotic consumption, adequate fluid with goal of 64 oz per day and adequate protein with goal of  grams per day.  Stressed the importance of 30 min of physical

## 2023-08-30 ENCOUNTER — OFFICE VISIT (OUTPATIENT)
Age: 43
End: 2023-08-30

## 2023-08-30 VITALS
HEART RATE: 88 BPM | SYSTOLIC BLOOD PRESSURE: 127 MMHG | DIASTOLIC BLOOD PRESSURE: 85 MMHG | WEIGHT: 293 LBS | OXYGEN SATURATION: 100 % | HEIGHT: 72 IN | TEMPERATURE: 97 F | BODY MASS INDEX: 39.68 KG/M2

## 2023-08-30 DIAGNOSIS — Z09 POSTOPERATIVE EXAMINATION: Primary | ICD-10-CM

## 2023-08-30 PROCEDURE — 99024 POSTOP FOLLOW-UP VISIT: CPT | Performed by: NURSE PRACTITIONER

## 2023-08-30 RX ORDER — URSODIOL 500 MG/1
500 TABLET, FILM COATED ORAL DAILY
Qty: 30 TABLET | Refills: 4 | Status: SHIPPED | OUTPATIENT
Start: 2023-08-30

## 2023-09-18 ENCOUNTER — TELEPHONE (OUTPATIENT)
Facility: HOSPITAL | Age: 43
End: 2023-09-18

## 2023-09-18 NOTE — TELEPHONE ENCOUNTER
9/18/2023:  Attempted to contact patient today in reference to: assessment of post-op diet tolerance and to discuss diet and vitamin/mineral supplement progression, as pt is approx. 1 mo S/P laparoscopic sleeve gastrectomy procedure. Patient was left a voicemail to contact me. My contact information was provided.        CM Muir, MS, RD

## 2023-09-19 ENCOUNTER — HOSPITAL ENCOUNTER (OUTPATIENT)
Facility: HOSPITAL | Age: 43
Discharge: HOME | End: 2023-09-22

## 2023-09-19 ENCOUNTER — TELEPHONE (OUTPATIENT)
Facility: HOSPITAL | Age: 43
End: 2023-09-19

## 2023-09-19 NOTE — PROGRESS NOTES
Patient is a 36 y.o.  female approx. 1 mo S/P laparoscopic sleeve gastrectomy procedure. There were no vitals filed for this visit. There is no height or weight on file to calculate BMI. Pt states that she has not weighed herself since she was last in the clinic for her 2 week post-op provider appt, visit was virtual.      Pre-op Wt: 314 lbs                  EBW: 122 lbs     I am unable to assess pts % EBWL 2' pt has not weighed herself. Pt is currently on a soft/puree food diet without difficulty. Patient is hydrating with  64   ounces, daily. Reports eating 2.5 meals/d, portion sizes are not weighed, \"maybe 2 baby teaspoons of a chicken thigh\" and meals take approximately 5 min to complete. Pt reports that she has something, \" a few bites\" Q 2-3 hrs. Patient is tolerating the following sources of protein:  chicken thigh, egg (scrambled), Aaliyah Humus, Fage 2% plain [place] yogurt, and crab. Pt notes that she also ate mashed potatoes. Pt states that she \"knows what [she] is supposed to do, but her mind is occupied with [her] kids\". Encouraged pt to refer to her pre-op book for diet guidelines during these times and to help her stay on track. Protein shake intake: 2 protein supplement shakes/day. [x] Premier  [] [employer]  [] Ensure Max protein  [] Premier Clear  [] Isopure, unflavored . [] Other: N/A     Patient is currently walking at a brisk-pace for exercise,  min/day  x 3-4 d/wk. Patient [] has  [x] has not had any readmissions, re-operations, complications, ED visits, nor IVF at [facility] during her first post-op month. Pt [x]is []is not taking their Prilosec. Supplements:  [x] [employer] Complete MVI  BID  [x] Probiotic     Reviewed the following with patient:  Advance diet to solid phase. Reminded to measure portions, continue high protein, low carbohydrate diet. Reminded to eat regularly, to eat slowly & not to drink with meals.   Refer to the handbook

## 2023-09-19 NOTE — TELEPHONE ENCOUNTER
9/19/2023:  Attempted to contact patient today at  9:30 am in reference to: assessment of post-op diet tolerance and to discuss diet and vitamin/mineral supplement progression, as pt is approx. 1 mo S/P laparoscopic sleeve gastrectomy procedure. Patient was left a voicemail to contact me. I attempted to reach pts emergency contact #1 (mother), however she had an error message stating that she \"could not accept calls at this time\". I attempted to reach her emergency contact #2 (Aunt), VM was left for pt to call me back. I was able to reach pts emergency contact #3 (father) at 9:50 am who stated that he would get in touch with her and have her call me. My contact information was provided.        CM Stubbs, MS, RD

## 2024-02-05 ENCOUNTER — OFFICE VISIT (OUTPATIENT)
Age: 44
End: 2024-02-05
Payer: MEDICAID

## 2024-02-05 ENCOUNTER — HOSPITAL ENCOUNTER (OUTPATIENT)
Facility: HOSPITAL | Age: 44
Discharge: HOME | End: 2024-02-08
Payer: MEDICAID

## 2024-02-05 VITALS
OXYGEN SATURATION: 100 % | SYSTOLIC BLOOD PRESSURE: 135 MMHG | HEIGHT: 72 IN | BODY MASS INDEX: 31.31 KG/M2 | DIASTOLIC BLOOD PRESSURE: 79 MMHG | WEIGHT: 231.2 LBS | HEART RATE: 86 BPM | TEMPERATURE: 97 F

## 2024-02-05 DIAGNOSIS — K90.9 INTESTINAL MALABSORPTION, UNSPECIFIED TYPE: ICD-10-CM

## 2024-02-05 DIAGNOSIS — Z98.84 S/P LAPAROSCOPIC SLEEVE GASTRECTOMY: ICD-10-CM

## 2024-02-05 DIAGNOSIS — K90.9 INTESTINAL MALABSORPTION, UNSPECIFIED TYPE: Primary | ICD-10-CM

## 2024-02-05 PROBLEM — E66.01 MORBID OBESITY WITH BODY MASS INDEX (BMI) OF 40.0 OR HIGHER (HCC): Status: RESOLVED | Noted: 2023-08-15 | Resolved: 2024-02-05

## 2024-02-05 LAB
25(OH)D3 SERPL-MCNC: 22 NG/ML (ref 30–100)
ALBUMIN SERPL-MCNC: 3.5 G/DL (ref 3.4–5)
ALBUMIN/GLOB SERPL: 1 (ref 0.8–1.7)
ALP SERPL-CCNC: 67 U/L (ref 45–117)
ALT SERPL-CCNC: 20 U/L (ref 13–56)
ANION GAP SERPL CALC-SCNC: 2 MMOL/L (ref 3–18)
AST SERPL-CCNC: 15 U/L (ref 10–38)
BASOPHILS # BLD: 0 K/UL (ref 0–0.1)
BASOPHILS NFR BLD: 1 % (ref 0–2)
BILIRUB SERPL-MCNC: 0.5 MG/DL (ref 0.2–1)
BUN SERPL-MCNC: 12 MG/DL (ref 7–18)
BUN/CREAT SERPL: 18 (ref 12–20)
CALCIUM SERPL-MCNC: 9 MG/DL (ref 8.5–10.1)
CHLORIDE SERPL-SCNC: 110 MMOL/L (ref 100–111)
CO2 SERPL-SCNC: 30 MMOL/L (ref 21–32)
CREAT SERPL-MCNC: 0.67 MG/DL (ref 0.6–1.3)
DIFFERENTIAL METHOD BLD: ABNORMAL
EOSINOPHIL # BLD: 0 K/UL (ref 0–0.4)
EOSINOPHIL NFR BLD: 1 % (ref 0–5)
ERYTHROCYTE [DISTWIDTH] IN BLOOD BY AUTOMATED COUNT: 13.7 % (ref 11.6–14.5)
FERRITIN SERPL-MCNC: 6 NG/ML (ref 8–388)
FOLATE SERPL-MCNC: 14 NG/ML (ref 3.1–17.5)
GLOBULIN SER CALC-MCNC: 3.5 G/DL (ref 2–4)
GLUCOSE SERPL-MCNC: 98 MG/DL (ref 74–99)
HCT VFR BLD AUTO: 32 % (ref 35–45)
HGB BLD-MCNC: 10.1 G/DL (ref 12–16)
IMM GRANULOCYTES # BLD AUTO: 0 K/UL (ref 0–0.04)
IMM GRANULOCYTES NFR BLD AUTO: 0 % (ref 0–0.5)
IRON SERPL-MCNC: 62 UG/DL (ref 50–175)
LYMPHOCYTES # BLD: 1.5 K/UL (ref 0.9–3.6)
LYMPHOCYTES NFR BLD: 45 % (ref 21–52)
MCH RBC QN AUTO: 27.1 PG (ref 24–34)
MCHC RBC AUTO-ENTMCNC: 31.6 G/DL (ref 31–37)
MCV RBC AUTO: 85.8 FL (ref 78–100)
MONOCYTES # BLD: 0.2 K/UL (ref 0.05–1.2)
MONOCYTES NFR BLD: 7 % (ref 3–10)
NEUTS SEG # BLD: 1.5 K/UL (ref 1.8–8)
NEUTS SEG NFR BLD: 46 % (ref 40–73)
NRBC # BLD: 0 K/UL (ref 0–0.01)
NRBC BLD-RTO: 0 PER 100 WBC
PLATELET # BLD AUTO: 322 K/UL (ref 135–420)
PMV BLD AUTO: 10.9 FL (ref 9.2–11.8)
POTASSIUM SERPL-SCNC: 4.1 MMOL/L (ref 3.5–5.5)
PROT SERPL-MCNC: 7 G/DL (ref 6.4–8.2)
RBC # BLD AUTO: 3.73 M/UL (ref 4.2–5.3)
SODIUM SERPL-SCNC: 142 MMOL/L (ref 136–145)
VIT B12 SERPL-MCNC: 424 PG/ML (ref 211–911)
WBC # BLD AUTO: 3.3 K/UL (ref 4.6–13.2)

## 2024-02-05 PROCEDURE — 99214 OFFICE O/P EST MOD 30 MIN: CPT | Performed by: NURSE PRACTITIONER

## 2024-02-05 PROCEDURE — 80053 COMPREHEN METABOLIC PANEL: CPT

## 2024-02-05 PROCEDURE — 82607 VITAMIN B-12: CPT

## 2024-02-05 PROCEDURE — 83540 ASSAY OF IRON: CPT

## 2024-02-05 PROCEDURE — 82306 VITAMIN D 25 HYDROXY: CPT

## 2024-02-05 PROCEDURE — 82728 ASSAY OF FERRITIN: CPT

## 2024-02-05 PROCEDURE — 85025 COMPLETE CBC W/AUTO DIFF WBC: CPT

## 2024-02-05 PROCEDURE — 84425 ASSAY OF VITAMIN B-1: CPT

## 2024-02-05 PROCEDURE — 82746 ASSAY OF FOLIC ACID SERUM: CPT

## 2024-02-05 PROCEDURE — 36415 COLL VENOUS BLD VENIPUNCTURE: CPT

## 2024-02-05 NOTE — PROGRESS NOTES
Surgical History:   Procedure Laterality Date    SLEEVE GASTRECTOMY N/A 8/15/2023    1. LAPAROSCOPIC SLEEVE GASTRECTOMY 2. OVERSEW ENTIRE GASTRIC STAPLE LINE  3. LIVER WEDGE BIOPSY  4. INTRAOPERATIVE ENDOSCOPY WITH BIOPSY performed by Romario Walker MD at [facility] MAIN OR    TUBAL LIGATION  2021     Current Outpatient Medications   Medication Sig Dispense Refill    Prenatal MV-Min-Fe Fum-FA-DHA (PRENATAL 1 PO) Take by mouth      omeprazole (PRILOSEC) 20 MG delayed release capsule Take 1 capsule by mouth Daily (Patient not taking: Reported on 2/5/2024) 30 capsule 1     No current facility-administered medications for this visit.       Review of Systems:  General - Denies fatigue, fever, chills  Cardiac - Denies chest pain, palpitations, shortness of breath  Pulmonary - Denies shortness of breath or productive cough  Gastrointestinal - as noted above  Musculoskeletal - Denies joint or muscle weakness, pain, stiffness  Hematologic - Denies abnormal bleeding or bruising  Neurologic - Denies weakness, paralysis, numbness, tingling    Objective:     /79   Pulse 86   Temp 97 °F (36.1 °C)   Ht 1.854 m (6' 1\")   Wt 104.9 kg (231 lb 3.2 oz)   SpO2 100%   BMI 30.50 kg/m²      Physical Exam:      General appearance:  alert, cooperative, no distress, appears stated age   Mental status   alert, oriented to person, place, and time   Neck  supple, no significant adenopathy     Lymphatics  no palpable lymphadenopathy, no hepatosplenomegaly   Chest  clear to auscultation, no wheezes, rales or rhonchi, symmetric air entry   Heart  normal rate, regular rhythm, normal S1, S2, no murmurs, rubs, clicks or gallops    Abdomen: soft, nontender, nondistended, no masses or organomegaly   Incision:  Well healed, no hernias      Neurological  alert, oriented, normal speech, no focal findings or movement disorder noted   Musculoskeletal no joint tenderness, deformity or swelling   Extremities peripheral pulses normal, no pedal edema,

## 2024-02-05 NOTE — PATIENT INSTRUCTIONS
[employer] or [employer]itness Pal Maria Luisa  80-90g protein  800-1000 calories   Keep carbs below 50g    Procare multivitamin 45mg iron  B complex  Calcium chews 3 times a day    Patient Instructions      Remember hydration goals - minimum of 64 ounces of liquids per day (dehydration is the number one reason for hospital readmission).  Continue to monitor carbohydrate and protein intake you need a minimum of  Grams of protein daily- remember to keep your total carbohydrates to 50 grams or less per day for best results.  Continue to work towards exercise goals - 60-90 minutes, 5 times a week minimum of deliberate, aerobic exercise is the ultimate goal with strength training 2 times each week. Refer to [employer] denis for  information.  Remember to take vitamins as directed.  Attend support group the 2nd Thursday of each month.  6.  Constipation: Milk of Magnesia is for immediate relief only.  Miralax is to be used every day if constipation is a chronic problem.    7.  Diarrhea: patients will occasionally develop lactose intolerance after surgery.  Check to see if your protein shake has whey in it.  If it does try a protein powder or drink that does not have whey and stop all yogurts, cheeses and milks to see if the diarrhea goes away.    8.  If you have had labs drawn.  We will only call you if you have abnormal results.  Otherwise you can access the lab results in \"[email or web address]\".  You will only need the access code the first time you sign on.    9.  Call us at [phone] or email us through \"[email or web address]\" with questions,     concerns or worsening of condition, we have someone on call 24 hours a day.  If you are unable to reach our office, you are to go to your Primary Care Physician or the Emergency Department.     NOTE TO GASTRIC BYPASS PATIENTS:  (SAME APPLIES TO GASTRIC SLEEVE PATIENTS FOR FIRST TWO MONTHS)  Remember that for the rest of your life, you are not able to take the following:  - NSAIDs (ibuprofen,

## 2024-02-06 RX ORDER — ERGOCALCIFEROL 1.25 MG/1
50000 CAPSULE ORAL WEEKLY
Qty: 26 CAPSULE | Refills: 1 | Status: SHIPPED | OUTPATIENT
Start: 2024-02-06

## 2024-02-13 LAB — VIT B1 BLD-SCNC: 67.2 NMOL/L (ref 66.5–200)

## 2025-07-08 ENCOUNTER — CLINICAL DOCUMENTATION (OUTPATIENT)
Facility: HOSPITAL | Age: 45
End: 2025-07-08

## (undated) DEVICE — APPLIER CLP L SHFT DIA12MM 20 ROT MULT LIGACLP

## (undated) DEVICE — TROCAR ENDOSCP BLDELSS 12X100 MM W/ HNDL STBL SL OPT TIP

## (undated) DEVICE — 3L THIN WALL CAN: Brand: CRD

## (undated) DEVICE — RELOAD STPL L60MM H1.5-3.6MM REG TISS BLU GRIPPING SURF B

## (undated) DEVICE — TUBING, SUCTION, 1/4" X 12', STRAIGHT: Brand: MEDLINE

## (undated) DEVICE — ENDOSCOPY PUMP TUBING/ CAP SET: Brand: ERBE

## (undated) DEVICE — RELOAD STPL H4.1X2MM DIA60MM THCK TISS GRN 6 ROW PWR GST B

## (undated) DEVICE — VISIGI 3D®  CALIBRATION SYSTEM  SIZE 36FR STD W/ BULB: Brand: BOEHRINGER® VISIGI 3D™ SLEEVE GASTRECTOMY CALIBRATION SYSTEM, SIZE 36FR W/BULB

## (undated) DEVICE — SYRINGE,TOOMEY,IRRIGATION,70CC,STERILE: Brand: MEDLINE

## (undated) DEVICE — SUTURE ETHLN SZ 3-0 L30IN NONABSORBABLE BLK FSL L30MM 3/8 1671H

## (undated) DEVICE — BSMI CSECTION BIRTHING PACK: Brand: MEDLINE INDUSTRIES, INC.

## (undated) DEVICE — SUTURE VCRL SZ 1 L36IN ABSRB UD CTX L48MM 1/2 CIR J977H

## (undated) DEVICE — INTENDED FOR TISSUE SEPARATION, AND OTHER PROCEDURES THAT REQUIRE A SHARP SURGICAL BLADE TO PUNCTURE OR CUT.: Brand: BARD-PARKER ® CARBON RIB-BACK BLADES

## (undated) DEVICE — STRIP SKIN CLSR W1XL5IN NYLON REINF CURAD STERIL

## (undated) DEVICE — SHEARS LAP L45CM DIA5MM ULTRASONIC CRV TIP ADV HEMSTAS HARM

## (undated) DEVICE — FORCEPS BX OVL CUP SERR DISP CAP L 240CM RAD JAW 4

## (undated) DEVICE — SOLUTION IRRIG 500ML 0.9% SOD CHLO USP POUR PLAS BTL

## (undated) DEVICE — SEALANT TISS 10 CC FOR HUM FIBRIN VISTASEAL

## (undated) DEVICE — SUTURE PDS II SZ 0 L27IN ABSRB VLT L26MM CT-2 1/2 CIR Z334H

## (undated) DEVICE — APPLICATOR LAP 35 CM 2 RIGID VISTASEAL

## (undated) DEVICE — GARMENT,MEDLINE,DVT,INT,CALF,MED, GEN2: Brand: MEDLINE

## (undated) DEVICE — SLEEVE TRCR L100MM DIA5MM UNIV STBL FOR BLDELSS DIL TIP

## (undated) DEVICE — Z DISCONTINUED USE 2219801 STAPLER SKIN REG CRWN L5.7MM LEG L3.9MM WIRE DIA0.53MM PROX

## (undated) DEVICE — SET TBNG DISP TIP FOR AHTO

## (undated) DEVICE — BARIATRIC: Brand: MEDLINE INDUSTRIES, INC.

## (undated) DEVICE — REM POLYHESIVE ADULT PATIENT RETURN ELECTRODE: Brand: VALLEYLAB

## (undated) DEVICE — SUTURE ETHIB EXCL BR GRN TAPR PT 2-0 30 X563H X563H

## (undated) DEVICE — Device

## (undated) DEVICE — STAPLER SKIN LN REINF 60 MM ECHELON ENDOPATH

## (undated) DEVICE — TROCAR LAP L100MM DIA5MM BLDELSS W/ STBL SL ENDOPATH XCEL

## (undated) DEVICE — SOLUTION SURG PREP 26 CC PURPREP

## (undated) DEVICE — ELECTRODE PT RET AD L9FT HI MOIST COND ADH HYDRGEL CORDED

## (undated) DEVICE — ARYGLE SUCTION CATHETER WITH STRAIGHT CONNECTOR COIL PACKED 10 FR/ CH: Brand: ARGYLE

## (undated) DEVICE — DEVICE CLSR TRCR PRT

## (undated) DEVICE — SYR 10ML LUER LOK 1/5ML GRAD --

## (undated) DEVICE — NEEDLE HYPO 25GA L1.5IN BLU POLYPR HUB S STL REG BVL STR

## (undated) DEVICE — GLOVE ORANGE PI 7 1/2   MSG9075

## (undated) DEVICE — SOLUTION IV LACTATED RINGERS INJECTION USP

## (undated) DEVICE — KIT SUTURING DEVICE M-CLOSE

## (undated) DEVICE — SUTURE MCRYL + SZ 4-0 L27IN ABSRB UD L19MM PS-2 3/8 CIR MCP426H

## (undated) DEVICE — STRAP,POSITIONING,KNEE/BODY,FOAM,4X60": Brand: MEDLINE

## (undated) DEVICE — STAPLER 60MM POWERED ECHELON 3000 LONG 440MM

## (undated) DEVICE — STAPLER SKIN SQ 30 ABSRB STPL DISP INSORB

## (undated) DEVICE — TROCAR ENDOSCP L100MM DIA12MM STBL SL BLDELSS ENDOPATH XCEL